# Patient Record
Sex: MALE | Race: WHITE | Employment: OTHER | URBAN - METROPOLITAN AREA
[De-identification: names, ages, dates, MRNs, and addresses within clinical notes are randomized per-mention and may not be internally consistent; named-entity substitution may affect disease eponyms.]

---

## 2017-03-30 ENCOUNTER — OFFICE VISIT (OUTPATIENT)
Dept: INTERNAL MEDICINE CLINIC | Age: 57
End: 2017-03-30

## 2017-04-17 ENCOUNTER — HOSPITAL ENCOUNTER (OUTPATIENT)
Dept: LAB | Age: 57
Discharge: HOME | End: 2017-04-17
Payer: COMMERCIAL

## 2017-04-17 DIAGNOSIS — I10 ESSENTIAL HYPERTENSION WITH GOAL BLOOD PRESSURE LESS THAN 140/90: ICD-10-CM

## 2017-04-17 DIAGNOSIS — R73.01 IFG (IMPAIRED FASTING GLUCOSE): ICD-10-CM

## 2017-04-17 LAB
ANION GAP BLD CALC-SCNC: 8 MMOL/L (ref 3–18)
BUN SERPL-MCNC: 14 MG/DL (ref 7–18)
BUN/CREAT SERPL: 18 (ref 12–20)
CALCIUM SERPL-MCNC: 8.3 MG/DL (ref 8.5–10.1)
CHLORIDE SERPL-SCNC: 104 MMOL/L (ref 100–108)
CO2 SERPL-SCNC: 28 MMOL/L (ref 21–32)
CREAT SERPL-MCNC: 0.8 MG/DL (ref 0.6–1.3)
CREAT UR-MCNC: 187 MG/DL (ref 30–125)
GLUCOSE SERPL-MCNC: 140 MG/DL (ref 74–99)
MICROALBUMIN UR-MCNC: 2.23 MG/DL (ref 0–3)
MICROALBUMIN/CREAT UR-RTO: 12 MG/G (ref 0–30)
POTASSIUM SERPL-SCNC: 4.2 MMOL/L (ref 3.5–5.5)
SODIUM SERPL-SCNC: 140 MMOL/L (ref 136–145)

## 2017-04-17 PROCEDURE — 80048 BASIC METABOLIC PNL TOTAL CA: CPT | Performed by: INTERNAL MEDICINE

## 2017-04-17 PROCEDURE — 36415 COLL VENOUS BLD VENIPUNCTURE: CPT | Performed by: INTERNAL MEDICINE

## 2017-04-17 PROCEDURE — 82043 UR ALBUMIN QUANTITATIVE: CPT | Performed by: INTERNAL MEDICINE

## 2017-04-18 NOTE — PROGRESS NOTES
61 y.o. WHITE OR  male who presents for evaluation. We called probable DM at the last visit. He declined metformin, but he did go for diabetic teaching. He notes that when he stays on plan, his weight and sugars are in good range. Remains active, weight is not going down as below    Vitals 4/20/2017 9/27/2016 3/30/2016 3/11/2016 3/7/2016 9/30/2015   Weight 256 lb 260 lb 248 lb 246 lb 8 oz 240 lb 259 lb     Vitals 9/28/2015   Weight 255 lb     No wheezing, dyspnea, chronic cough    No gi or gu complaints    He hasn't felt well the last day or so, just tired and he did not recognize he has a fever during this encounter. No generalized achiness, ear/sinus/throat sx, no cough, gi or gu sx. No rash, he admits to exposure at work to ill individuals    Past Medical History:   Diagnosis Date    Diverticulosis     and polyp 12/13 Dr Axel Vee    Dyslipidemia     calculatd 10 year risk score was 13.6% (9/15); 11.1% (3/16)    Fatty liver     on biopsy 2000 in [place];  Fib-4 was 1.25 from 3/15    Heel spur     left    Hypertension     IFG (impaired fasting glucose)     Morbid obesity (HCC)     peak weight 255 lbs, bmi 37.6 from 3/12; declined emilia suppressants, med supervised wt loss, bariatric options    Pancreatitis 10/14    and cholecystitis Dr Jacob Craig Tobacco dependence syndrome      Past Surgical History:   Procedure Laterality Date    HX CHOLECYSTECTOMY  12/14    Dr Irene Cerrato HX COLONOSCOPY      negative 2000; diverticulosis and polyps 2013 Dr Homer Leon  2015    left medial meniscus tear Dr Joanna Bender HX UROLOGICAL      testicular tortion    HX VASECTOMY      VASCULAR SURGERY PROCEDURE UNLIST  9/15    left GSV and EVLT Dr Leanna Langford Marital status:      Spouse name: N/A    Number of children: 1    Years of education: N/A     Occupational History    proj engr civilian working for [employer]      Social History Main Topics    Smoking status: Current Every Day Smoker     Types: Cigars    Smokeless tobacco: Never Used      Comment: smokes on cigar per day, previous hx. of cigarette smoking    Alcohol use Yes      Comment: social    Drug use: No    Sexual activity: Not on file     Other Topics Concern    Not on file     Social History Narrative     Current Outpatient Prescriptions   Medication Sig    metFORMIN ER (GLUCOPHAGE XR) 500 mg tablet Take 1 Tab by mouth daily (with dinner).  amLODIPine (NORVASC) 5 mg tablet TAKE ONE TABLET BY MOUTH EVERY DAY    Lancets (ACCU-CHEK MULTICLIX LANCET) misc TEST TWO TIMES A DAY. DX:E11.9    glucose blood VI test strips (ACCU-CHEK JONATHAN PLUS TEST STRP) strip TEST TWO TIMES A DAY. DX: 250.00    ondansetron (ZOFRAN ODT) 4 mg disintegrating tablet Take 1 Tab by mouth every eight (8) hours as needed for Nausea.  ascorbic acid (VITAMIN C) 500 mg tablet Take  by mouth.  diclofenac (VOLTAREN-XR) 100 mg ER tablet Take 100 mg by mouth daily.  vitamin E (AQUA GEMS) 400 unit capsule Take  by mouth daily.  MILK THISTLE, BULK, by Does Not Apply route.  coenzyme q10-vitamin e (COQ10 ) 100-100 mg-unit Cap Take  by mouth.  cholecalciferol (VITAMIN D3) 400 unit Tab tablet Take  by mouth daily. No current facility-administered medications for this visit.       Allergies   Allergen Reactions    Pcn [Penicillins] Swelling     REVIEW OF SYSTEMS: colo 12/13 Dr Ellie Forbes  Ophtho - no vision change or eye pain  Oral - no mouth pain, tongue or tooth problems  Ears - no hearing loss, ear pain, fullness, no swallowing problems  Cardiac - no CP, PND, orthopnea, edema, palpitations or syncope  Chest - no breast masses  Resp - no wheezing, chronic coughing, dyspnea  GI - no heartburn, nausea, vomiting, change in bowel habits, bleeding, hemorrhoids  Urinary - no dysuria, hematuria, flank pain, urgency, frequency  Ortho - no swelling, dec ROM, myalgias  Neuro - no focal weakness, numbness, paresthesias, incoordination, ataxia, involuntary movements  Endo - no polyuria, polydipsia, nocturia, hot flashes    Visit Vitals    /74    Pulse 95    Temp (!) 102.1 °F (38.9 °C) (Oral)    Ht 5' 9\" (1.753 m)    Wt 256 lb (116.1 kg)    SpO2 97%    BMI 37.8 kg/m2   A&O x3  Affect is appropriate. Mood stable  No apparent distress  Anicteric, no JVD, adenopathy or thyromegaly. No carotid bruits or radiated murmur  Lungs clear to auscultation, no wheezes or rales  Heart showed regular rate and rhythm. No murmur, rubs, gallops  Abdomen soft nontender, no hepatosplenomegaly or masses. Extremities without edema, venous varicosities noted.   Pulses 1-2+ symmetrically    LABS    From 1/07 showed   gluc 108, cr 0.90,             alt 110,  hba1c 5.3,                   chol 212, tg 129, hdl 51, ldl-c 135, wbc 8.1, hb 17.7, plt 214, ast 67  From 3/12 showed   gluc 128, cr 0.92, gfr 95,                hba1c 5.7, ldl-p 1412,                 wbc 7.0, hb 17.0, plt 211, tsh 2.13  From 4/12 showed   gluc 95,   cr 0.89, gfr 98,  alt 57,    hba1c 5.7,                   chol 163, tg 120, hdl 47, ldl-c 92  From 6/12 showed   gluc 90,   cr 0.80, gfr 85,  alt 19,    hba1c 5.2, ldl-p 1416  From 1/13 showed   gluc 96,   cr 0.88, gfr 98,  alt 24,    hba1c 5.1, ldl-p 1421, chol 185, tg 125, hdl 49, ldl-c 111   From 10/14 showed gluc 123, cr 1.00, gfr>60, alt 59,                    wbc 9.6, hb 16.7, plt 222, ast 437, alk phos 69, tb 4.7, lip 7684, ck/trop neg  From 3/15 showed   gluc 118, cr 0.81, gfr 100, alt 57,   hba1c 5.9,        chol 184, tg 200, hdl 44, ldl-c 100, wbc 8.0, hb 17.1, plt 216, tsh 2.23  From 9/15 showed   gluc 117, cr 0.90, gfr 95,       hba1c 6.0,        chol 200, tg 176, hdl 46, ldl-c 119  From 3/16 showed           hba1c 5.7,        chol 169, tg 154, hdl 45, ldl-c 93,           tsh 2.09  From 9/16 showed   gluc 132, cr 0.77, gfr>60,       hba1c 5.8,        chol 162, tg 160, hdl 43, ldl-c 87,   wbc 8.3, hb 16.3, plt 210  From 4/17 showed   gluc 140, cr 0.80, gfr>60,                   umar 12    Results for orders placed or performed in visit on 04/20/17   AMB POC RAPID INFLUENZA TEST   Result Value Ref Range    VALID INTERNAL CONTROL POC Yes     QuickVue Influenza test Negative Negative   AMB POC HEMOGLOBIN A1C   Result Value Ref Range    Hemoglobin A1c (POC) 6.5 %     Patient Active Problem List   Diagnosis Code    Dyslipidemia E78.5    Colon polyp and diverticulosis Dr Eric Bullock 12/13 K63.5    Chronic venous hypertension without complications S68.102    Varicose veins of lower extremities with other complications G51.532    Primary hypertension I10    Obesity (BMI 30-39. 9) E66.9    Tobacco dependence syndrome F17.200    Controlled type 2 diabetes mellitus E11.9     Assessment and plan:  1. Hypertension. Continue current   2. Obesity. Not interested in meds, bariatric options or medically supervised intensive wt loss programs at previous discussions  3. Transaminasemia. Wt loss   4. DM. Another long discussion and he finally decided to try metformin  5. Dyslipidemia. Lifestyle and dietary measures, he continues to decline statins despite prior discussions regarding current guidelines  6. Smoking cessation reiterated  7. Vascular. F/U Dr Zeynep Lomas  8. Probable viral syndrome. He will call if he has progressive sx          RTC 8/17    Above conditions discussed at length and patient vocalized understanding.   All questions answered to patient satifaction

## 2017-04-20 ENCOUNTER — OFFICE VISIT (OUTPATIENT)
Dept: INTERNAL MEDICINE CLINIC | Age: 57
End: 2017-04-20

## 2017-04-20 VITALS
BODY MASS INDEX: 37.92 KG/M2 | DIASTOLIC BLOOD PRESSURE: 74 MMHG | TEMPERATURE: 102.1 F | HEART RATE: 95 BPM | OXYGEN SATURATION: 97 % | WEIGHT: 256 LBS | SYSTOLIC BLOOD PRESSURE: 120 MMHG | HEIGHT: 69 IN

## 2017-04-20 DIAGNOSIS — E11.9 CONTROLLED TYPE 2 DIABETES MELLITUS WITHOUT COMPLICATION, WITHOUT LONG-TERM CURRENT USE OF INSULIN (HCC): Primary | ICD-10-CM

## 2017-04-20 DIAGNOSIS — K63.5 POLYP OF COLON, UNSPECIFIED PART OF COLON, UNSPECIFIED TYPE: ICD-10-CM

## 2017-04-20 DIAGNOSIS — E78.5 DYSLIPIDEMIA: ICD-10-CM

## 2017-04-20 DIAGNOSIS — B34.9 VIRAL SYNDROME: ICD-10-CM

## 2017-04-20 DIAGNOSIS — R50.9 FEVER, UNSPECIFIED FEVER CAUSE: ICD-10-CM

## 2017-04-20 DIAGNOSIS — I10 ESSENTIAL HYPERTENSION WITH GOAL BLOOD PRESSURE LESS THAN 140/90: ICD-10-CM

## 2017-04-20 DIAGNOSIS — E66.9 OBESITY (BMI 30-39.9): ICD-10-CM

## 2017-04-20 LAB
HBA1C MFR BLD HPLC: 6.5 %
QUICKVUE INFLUENZA TEST: NEGATIVE
VALID INTERNAL CONTROL?: YES

## 2017-04-20 RX ORDER — METFORMIN HYDROCHLORIDE 500 MG/1
500 TABLET, EXTENDED RELEASE ORAL
Qty: 30 TAB | Refills: 5 | Status: SHIPPED | OUTPATIENT
Start: 2017-04-20 | End: 2017-08-25

## 2017-04-20 NOTE — MR AVS SNAPSHOT
Visit Information Date & Time Provider Department Dept. Phone Encounter #  
 4/20/2017  3:00 PM Daphne Murphy MD Internist of [address] [id number] Your Appointments 8/25/2017  3:15 PM  
Office Visit with Daphne Murphy MD  
Internist of [address] [facility]) Appt Note: 4 mo follow up  
 [address] [facility] [employer] [facility], [address] [facility] [address] [place]  
  
   
 [address], [address] Upcoming Health Maintenance Date Due Hepatitis C Screening 1960 DTaP/Tdap/Td series (1 - Tdap) 7/21/1980 COLONOSCOPY 12/13/2023 Allergies as of 4/20/2017  Review Complete On: 4/17/2017 By: Daphne Murphy MD  
  
 Severity Noted Reaction Type Reactions Pcn [Penicillins]    Swelling Current Immunizations  Reviewed on 7/26/2012 Name Date Influenza Vaccine (Quad) PF 9/27/2016 Pneumococcal Polysaccharide (PPSV-23) 3/30/2016 Not reviewed this visit You Were Diagnosed With   
  
 Codes Comments Fever, unspecified fever cause    -  Primary ICD-10-CM: R50.9 ICD-9-CM: 780.60 Vitals BP Pulse Temp Height(growth percentile) Weight(growth percentile) SpO2  
 120/74 95 (!) 102.1 °F (38.9 °C) (Oral) 5' 9\" (1.753 m) 256 lb (116.1 kg) 97% BMI Smoking Status 37.8 kg/m2 Current Every Day Smoker Vitals History BMI and BSA Data Body Mass Index Body Surface Area  
 37.8 kg/m 2 2.38 m 2 Preferred Pharmacy Pharmacy Name Phone [facility] [address] [phone] Your Updated Medication List  
  
   
This list is accurate as of: 4/20/17  3:55 PM.  Always use your most recent med list. amLODIPine 5 mg tablet Commonly known as:  Piatt Cradle TAKE ONE TABLET BY MOUTH EVERY DAY  
  
 cholecalciferol 400 unit Tab tablet Commonly known as:  VITAMIN D3 Take  by mouth daily. COQ10  100-100 mg-unit Cap Generic drug:  coenzyme q10-vitamin e Take  by mouth. glucose blood VI test strips strip Commonly known as:  ACCU-CHEK JONATHAN PLUS TEST STRP  
TEST TWO TIMES A DAY. DX: 250.00 Lancets Misc Commonly known as:  ACCU-CHEK MULTICLIX LANCET  
TEST TWO TIMES A DAY. DX:E11.9 MILK THISTLE (BULK)  
by Does Not Apply route. ondansetron 4 mg disintegrating tablet Commonly known as:  ZOFRAN ODT Take 1 Tab by mouth every eight (8) hours as needed for Nausea. VITAMIN C 500 mg tablet Generic drug:  ascorbic acid (vitamin C) Take  by mouth.  
  
 vitamin E 400 unit capsule Commonly known as:  [address] Take  by mouth daily. VOLTAREN- mg ER tablet Generic drug:  diclofenac Take 100 mg by mouth daily. We Performed the Following AMB POC RAPID INFLUENZA TEST [61034 CPT(R)] Introducing [facility] SERVICES! Dear Ismael Vences: Thank you for requesting a Net[employer] account. Our records indicate that you already have an active Netcip[employer] account. You can access your account anytime at [email or web address] Did you know that you can access your hospital and ER discharge instructions at any time in Netcipia? You can also review all of your test results from your hospital stay or ER visit. Additional Information If you have questions, please visit the Frequently Asked Questions section of the Net[employer]ia website at [email or web address] Remember, Netcipia is NOT to be used for urgent needs. For medical emergencies, dial 911. Now available from your iPhone and Android! Please provide this summary of care documentation to your next provider. Your primary care clinician is listed as Jaky Page. If you have any questions after today's visit, please call [phone].

## 2017-04-20 NOTE — PROGRESS NOTES
1. Have you been to the ER, urgent care clinic or hospitalized since your last visit? NO.     2. Have you seen or consulted any other health care providers outside of the [address] [place] [address] since your last visit (Include any pap smears or colon screening)? NO      Do you have an Advanced Directive? NO    Would you like information on Advanced Directives?  YES

## 2017-04-21 RX ORDER — LANCETS
EACH MISCELLANEOUS
Qty: 100 EACH | Refills: 3 | Status: CANCELLED | OUTPATIENT
Start: 2017-04-21

## 2017-04-21 RX ORDER — LANCETS
EACH MISCELLANEOUS
Qty: 180 EACH | Refills: 11 | Status: SHIPPED | OUTPATIENT
Start: 2017-04-21 | End: 2018-12-08 | Stop reason: SDUPTHER

## 2017-04-21 NOTE — TELEPHONE ENCOUNTER
From: Tyshawn Melgar  To: Jeff Hawk MD  Sent: 4/21/2017 9:29 AM EDT  Subject: Medication Renewal Request    Original authorizing provider: MD Tyshawn Painter would like a refill of the following medications:  glucose blood VI test strips (ACCU-CHEK JONATHAN PLUS TEST STRP) strip Jeff Hawk MD]    Preferred pharmacy: [address] [place] [address]    Comment:

## 2017-05-23 RX ORDER — AMLODIPINE BESYLATE 5 MG/1
TABLET ORAL
Qty: 30 TAB | Refills: 6 | Status: SHIPPED | OUTPATIENT
Start: 2017-05-23 | End: 2018-01-01 | Stop reason: SDUPTHER

## 2017-08-21 ENCOUNTER — HOSPITAL ENCOUNTER (OUTPATIENT)
Dept: LAB | Age: 57
Discharge: HOME | End: 2017-08-21
Payer: COMMERCIAL

## 2017-08-21 DIAGNOSIS — E11.9 CONTROLLED TYPE 2 DIABETES MELLITUS WITHOUT COMPLICATION, WITHOUT LONG-TERM CURRENT USE OF INSULIN (HCC): ICD-10-CM

## 2017-08-21 LAB
ANION GAP SERPL CALC-SCNC: 7 MMOL/L (ref 3–18)
BUN SERPL-MCNC: 17 MG/DL (ref 7–18)
BUN/CREAT SERPL: 21 (ref 12–20)
CALCIUM SERPL-MCNC: 8.7 MG/DL (ref 8.5–10.1)
CHLORIDE SERPL-SCNC: 106 MMOL/L (ref 100–108)
CHOLEST SERPL-MCNC: 174 MG/DL
CO2 SERPL-SCNC: 28 MMOL/L (ref 21–32)
CREAT SERPL-MCNC: 0.82 MG/DL (ref 0.6–1.3)
GLUCOSE SERPL-MCNC: 145 MG/DL (ref 74–99)
HBA1C MFR BLD: 6.3 % (ref 4.2–5.6)
HDLC SERPL-MCNC: 44 MG/DL (ref 40–60)
HDLC SERPL: 4 {RATIO} (ref 0–5)
LDLC SERPL CALC-MCNC: 95.2 MG/DL (ref 0–100)
LIPID PROFILE,FLP: ABNORMAL
POTASSIUM SERPL-SCNC: 4.3 MMOL/L (ref 3.5–5.5)
SODIUM SERPL-SCNC: 141 MMOL/L (ref 136–145)
TRIGL SERPL-MCNC: 174 MG/DL (ref ?–150)
VLDLC SERPL CALC-MCNC: 34.8 MG/DL

## 2017-08-21 PROCEDURE — 36415 COLL VENOUS BLD VENIPUNCTURE: CPT | Performed by: INTERNAL MEDICINE

## 2017-08-21 PROCEDURE — 80048 BASIC METABOLIC PNL TOTAL CA: CPT | Performed by: INTERNAL MEDICINE

## 2017-08-21 PROCEDURE — 80061 LIPID PANEL: CPT | Performed by: INTERNAL MEDICINE

## 2017-08-21 PROCEDURE — 83036 HEMOGLOBIN GLYCOSYLATED A1C: CPT | Performed by: INTERNAL MEDICINE

## 2017-08-24 NOTE — PROGRESS NOTES
61 y.o. WHITE OR  male who presents for evaluation. He started metformin ER at the last visit but it gave him loose stools and he thinks a rash. Also reports a vague sensation in the neck area, no swelling, breathing difficulty, tongue sx. FBS in the 110-130 range. Denies polyuria, polydipsia, nocturia, vision change. No success w attempts at wt loss    Vitals 8/25/2017 4/20/2017 9/27/2016 3/30/2016 3/11/2016   Weight 256 lb 256 lb 260 lb 248 lb 246 lb 8 oz     No wheezing, dyspnea, chronic cough    No gi or gu complaints, specifically no gerd complaints    No sx referable to the thyroid    Past Medical History:   Diagnosis Date    Diabetes ([facility] [address] 2016    on basis of fbs>125    Diverticulosis     and polyp 12/13 Dr Cantrell Legacy    Dyslipidemia     calculatd 10 year risk score 13.6% (9/15); 11.1% (3/16); repeatedly declined statin    Fatty liver     on biopsy 2000 in ;  Fib-4 was 1.25 from 3/15    Heel spur     left    Hypertension     IFG (impaired fasting glucose)     Morbid obesity (HCC)     peak weight 255 lbs, bmi 37.6 from 3/12; declined emilia suppressants, med supervised wt loss, bariatric options    Pancreatitis 10/14    and cholecystitis Dr Lesley Kim Tobacco dependence syndrome      Past Surgical History:   Procedure Laterality Date    HX CHOLECYSTECTOMY  12/14    Dr Mia Cm HX COLONOSCOPY      negative 2000; diverticulosis and polyps 2013 Dr Haro Nones  2015    left medial meniscus tear Dr Nat Choudhury HX UROLOGICAL      testicular tortion    HX VASECTOMY      VASCULAR SURGERY PROCEDURE UNLIST  9/15    left GSV and EVLT Dr Mio Blanc Marital status:      Spouse name: N/A    Number of children: 1    Years of education: N/A     Occupational History    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 of cigarette smoking    Alcohol use Yes      Comment: social    Drug use: No    Sexual activity: Not on file     Other Topics Concern    Not on file     Social History Narrative     Current Outpatient Prescriptions   Medication Sig    atorvastatin (LIPITOR) 10 mg tablet Take 1 Tab by mouth daily.  amLODIPine (NORVASC) 5 mg tablet TAKE ONE TABLET BY MOUTH EVERY DAY    glucose blood VI test strips (ACCU-CHEK JONATHAN PLUS TEST STRP) strip TEST TWO TIMES A DAY. DX: E11.9    Lancets (ACCU-CHEK FASTCLIX) misc Pt checks blood sugar twice daily, Dx E11.9    Lancets (ACCU-CHEK MULTICLIX LANCET) misc TEST TWO TIMES A DAY. DX:E11.9    ascorbic acid (VITAMIN C) 500 mg tablet Take  by mouth.  coenzyme q10-vitamin e (COQ10 ) 100-100 mg-unit Cap Take  by mouth.  vitamin E (AQUA GEMS) 400 unit capsule Take  by mouth daily. No current facility-administered medications for this visit.       Allergies   Allergen Reactions    Metformin (Bulk) Diarrhea    Pcn [Penicillins] Swelling     REVIEW OF SYSTEMS: colo 12/13 Dr Go Phipps  Ophtho  no vision change or eye pain  Oral  no mouth pain, tongue or tooth problems  Ears  no hearing loss, ear pain, fullness, no swallowing problems  Cardiac  no CP, PND, orthopnea, edema, palpitations or syncope  Chest  no breast masses  Resp  no wheezing, chronic coughing, dyspnea  GI  no heartburn, nausea, vomiting, change in bowel habits, bleeding, hemorrhoids  Urinary  no dysuria, hematuria, flank pain, urgency, frequency  Ortho  no swelling, dec ROM, myalgias  Neuro  no focal weakness, numbness, paresthesias, incoordination, ataxia, involuntary movements  Endo - no polyuria, polydipsia, nocturia, hot flashes    Visit Vitals    /74 (BP 1 Location: Right arm, BP Patient Position: Sitting)    Pulse 77    Temp 98.6 °F (37 °C) (Oral)    Ht 5' 9\" (1.753 m)    Wt 256 lb (116.1 kg)    SpO2 98%    BMI 37.8 kg/m2   A&O x3  Affect is appropriate. Mood stable  No apparent distress  Anicteric, no JVD, adenopathy. Questionable fullness in the baseof neck  No carotid bruits or radiated murmur  Lungs clear to auscultation, no wheezes or rales  Heart showed regular rate and rhythm. No murmur, rubs, gallops  Abdomen soft nontender, no hepatosplenomegaly or masses. Extremities without edema, venous varicosities noted.   Pulses 1-2+ symmetrically    LABS    From 1/07 showed   gluc 108, cr 0.90,             alt 110,  hba1c 5.3,                   chol 212, tg 129, hdl 51, ldl-c 135, wbc 8.1, hb 17.7, plt 214, ast 67  From 3/12 showed   gluc 128, cr 0.92, gfr 95,                hba1c 5.7, ldl-p 1412,                 wbc 7.0, hb 17.0, plt 211, tsh 2.13  From 4/12 showed   gluc 95,   cr 0.89, gfr 98,  alt 57,    hba1c 5.7,                   chol 163, tg 120, hdl 47, ldl-c 92  From 6/12 showed   gluc 90,   cr 0.80, gfr 85,  alt 19,    hba1c 5.2, ldl-p 1416  From 1/13 showed   gluc 96,   cr 0.88, gfr 98,  alt 24,    hba1c 5.1, ldl-p 1421, chol 185, tg 125, hdl 49, ldl-c 111   From 10/14 showed gluc 123, cr 1.00, gfr>60, alt 59,                    wbc 9.6, hb 16.7, plt 222, ast 437, ap 69, tb 4.7, lip 7684, ck/trop neg  From 3/15 showed   gluc 118, cr 0.81, gfr 100, alt 57,   hba1c 5.9,        chol 184, tg 200, hdl 44, ldl-c 100, wbc 8.0, hb 17.1, plt 216, tsh 2.23  From 9/15 showed   gluc 117, cr 0.90, gfr 95,       hba1c 6.0,        chol 200, tg 176, hdl 46, ldl-c 119  From 3/16 showed           hba1c 5.7,        chol 169, tg 154, hdl 45, ldl-c 93,           tsh 2.09  From 9/16 showed   gluc 132, cr 0.77, gfr>60,       hba1c 5.8,        chol 162, tg 160, hdl 43, ldl-c 87,   wbc 8.3, hb 16.3, plt 210  From 4/17 showed   gluc 140, cr 0.80, gfr>60,       hba1c 6.5,                umar 12    Results for orders placed or performed during the hospital encounter of 95/14/61   METABOLIC PANEL, BASIC   Result Value Ref Range    Sodium 141 136 - 145 mmol/L    Potassium 4.3 3.5 - 5.5 mmol/L    Chloride 106 100 - 108 mmol/L    CO2 28 21 - 32 mmol/L    Anion gap 7 3.0 - 18 mmol/L    Glucose 145 (H) 74 - 99 mg/dL    BUN 17 7.0 - 18 MG/DL    Creatinine 0.82 0.6 - 1.3 MG/DL    BUN/Creatinine ratio 21 (H) 12 - 20      GFR est AA >60 >60 ml/min/1.73m2    GFR est non-AA >60 >60 ml/min/1.73m2    Calcium 8.7 8.5 - 10.1 MG/DL   LIPID PANEL   Result Value Ref Range    LIPID PROFILE          Cholesterol, total 174 <200 MG/DL    Triglyceride 174 (H) <150 MG/DL    HDL Cholesterol 44 40 - 60 MG/DL    LDL, calculated 95.2 0 - 100 MG/DL    VLDL, calculated 34.8 MG/DL    CHOL/HDL Ratio 4.0 0 - 5.0     HEMOGLOBIN A1C W/O EAG   Result Value Ref Range    Hemoglobin A1c 6.3 (H) 4.2 - 5.6 %     Patient Active Problem List   Diagnosis Code    Dyslipidemia E78.5    Colon polyp Dr Rasheed Szymanski 12/13 K63.5    Chronic venous hypertension without complications Q83.726    Varicose veins of lower extremities with other complications W77.514    Primary hypertension I10    Obesity (BMI 30-39. 9) E66.9    Tobacco dependence syndrome F17.200    Controlled type 2 diabetes mellitus E11.9    Diverticulosis  K57.30     Assessment and plan:  1. Hypertension. Continue current   2. Obesity. Not interested in meds, bariatric options or medically supervised intensive wt loss program  3. Fatty liver. Wt loss  4. DM. Declined changing to anything else even after discussion of various options, will recheck in 4 mos and go from there  5. Dyslipidemia. Lifestyle and dietary measures, he is open to trying statin now, sfx discussed at length. Check lfts 1 and 2 mos  6. Smoking cessation reiterated  7. Vascular. F/U Dr Yanni Dumont  8. Ultrasound thyroid ordered        RTC 12/17    Above conditions discussed at length and patient vocalized understanding.   All questions answered to patient satifaction

## 2017-08-25 ENCOUNTER — OFFICE VISIT (OUTPATIENT)
Dept: INTERNAL MEDICINE CLINIC | Age: 57
End: 2017-08-25

## 2017-08-25 VITALS
HEIGHT: 69 IN | BODY MASS INDEX: 37.92 KG/M2 | TEMPERATURE: 98.6 F | HEART RATE: 77 BPM | SYSTOLIC BLOOD PRESSURE: 128 MMHG | DIASTOLIC BLOOD PRESSURE: 74 MMHG | WEIGHT: 256 LBS | OXYGEN SATURATION: 98 %

## 2017-08-25 DIAGNOSIS — E11.9 CONTROLLED TYPE 2 DIABETES MELLITUS WITHOUT COMPLICATION, WITHOUT LONG-TERM CURRENT USE OF INSULIN (HCC): ICD-10-CM

## 2017-08-25 DIAGNOSIS — E04.9 GOITER: ICD-10-CM

## 2017-08-25 DIAGNOSIS — I10 ESSENTIAL HYPERTENSION WITH GOAL BLOOD PRESSURE LESS THAN 140/90: ICD-10-CM

## 2017-08-25 DIAGNOSIS — I87.303 CHRONIC VENOUS HYPERTENSION WITHOUT COMPLICATIONS, BILATERAL: ICD-10-CM

## 2017-08-25 DIAGNOSIS — E78.5 DYSLIPIDEMIA: ICD-10-CM

## 2017-08-25 DIAGNOSIS — K63.5 HYPERPLASTIC COLONIC POLYP, UNSPECIFIED PART OF COLON: ICD-10-CM

## 2017-08-25 DIAGNOSIS — Z23 ENCOUNTER FOR IMMUNIZATION: Primary | ICD-10-CM

## 2017-08-25 DIAGNOSIS — E66.9 OBESITY (BMI 30-39.9): ICD-10-CM

## 2017-08-25 PROBLEM — K57.30 DIVERTICULOSIS OF LARGE INTESTINE WITHOUT HEMORRHAGE: Status: ACTIVE | Noted: 2017-08-25

## 2017-08-25 RX ORDER — ATORVASTATIN CALCIUM 10 MG/1
10 TABLET, FILM COATED ORAL DAILY
Qty: 30 TAB | Refills: 5 | Status: SHIPPED | OUTPATIENT
Start: 2017-08-25 | End: 2018-02-10 | Stop reason: SDUPTHER

## 2017-08-25 NOTE — PATIENT INSTRUCTIONS
Advance Directives: Care Instructions  Your Care Instructions  An advance directive is a legal way to state your wishes at the end of your life. It tells your family and your doctor what to do if you can no longer say what you want. There are two main types of advance directives. You can change them any time that your wishes change. · A living will tells your family and your doctor your wishes about life support and other treatment. · A durable power of  for health care lets you name a person to make treatment decisions for you when you can't speak for yourself. This person is called a health care agent. If you do not have an advance directive, decisions about your medical care may be made by a doctor or a  who doesn't know you. It may help to think of an advance directive as a gift to the people who care for you. If you have one, they won't have to make tough decisions by themselves. Follow-up care is a key part of your treatment and safety. Be sure to make and go to all appointments, and call your doctor if you are having problems. It's also a good idea to know your test results and keep a list of the medicines you take. How can you care for yourself at home? · Discuss your wishes with your loved ones and your doctor. This way, there are no surprises. · Many states have a unique form. Or you might use a universal form that has been approved by many states. This kind of form can sometimes be completed and stored online. Your electronic copy will then be available wherever you have a connection to the Internet. In most cases, doctors will respect your wishes even if you have a form from a different state. · You don't need a  to do an advance directive. But you may want to get legal advice. · Think about these questions when you prepare an advance directive:  ¨ Who do you want to make decisions about your medical care if you are not able to?  Many people choose a family member or close friend. ¨ Do you know enough about life support methods that might be used? If not, talk to your doctor so you understand. ¨ What are you most afraid of that might happen? You might be afraid of having pain, losing your independence, or being kept alive by machines. ¨ Where would you prefer to die? Choices include your home, a hospital, or a nursing home. ¨ Would you like to have information about hospice care to support you and your family? ¨ Do you want to donate organs when you die? ¨ Do you want certain Rastafarian practices performed before you die? If so, put your wishes in the advance directive. · Read your advance directive every year, and make changes as needed. When should you call for help? Be sure to contact your doctor if you have any questions. Where can you learn more? Go to http://judi-bianca.info/. Enter R264 in the search box to learn more about \"Advance Directives: Care Instructions. \"  Current as of: November 17, 2016  Content Version: 11.3  © 4341-8093 [employer]. Care instructions adapted under license by [employer] (which disclaims liability or warranty for this information). If you have questions about a medical condition or this instruction, always ask your healthcare professional. [address] any warranty or liability for your use of this information.

## 2017-08-25 NOTE — MR AVS SNAPSHOT
Visit Information Date & Time Provider Department Dept. Phone Encounter #  
 8/25/2017  3:15 PM Marixa Lamb MD Internist of [address] [id number] Your Appointments 1/8/2018  9:00 AM  
Office Visit with Marixa Lamb MD  
Internist of [address] [facility]) Appt Note: 4 month f/u  
 [address] [facility], [address] [place] [place]  
  
   
 [address], [address] Upcoming Health Maintenance Date Due Hepatitis C Screening 1960 FOOT EXAM Q1 7/21/1969 DTaP/Tdap/Td series (1 - Tdap) 7/21/1980 HEMOGLOBIN A1C Q6M 2/21/2018 MICROALBUMIN Q1 4/17/2018 EYE EXAM RETINAL OR DILATED Q1 6/15/2018 LIPID PANEL Q1 8/21/2018 COLONOSCOPY 12/13/2023 Allergies as of 8/25/2017  Review Complete On: 4/20/2017 By: Marixa Lamb MD  
  
 Severity Noted Reaction Type Reactions Pcn [Penicillins]    Swelling Current Immunizations  Reviewed on 8/25/2017 Name Date Influenza Vaccine (Quad) PF 8/25/2017  3:57 PM, 9/27/2016 Pneumococcal Polysaccharide (PPSV-23) 3/30/2016 Reviewed by Marixa Lamb MD on 8/23/2017 at 10:21 PM  
 Reviewed by Anthony Hou on 8/25/2017 at  3:03 PM  
 Reviewed by Atnhony Hou on 8/25/2017 at  4:00 PM  
You Were Diagnosed With   
  
 Codes Comments Encounter for immunization    -  Primary ICD-10-CM: M37 ICD-9-CM: V03.89 Vitals BP Pulse Temp Height(growth percentile) Weight(growth percentile) SpO2  
 146/86 (BP 1 Location: Right arm, BP Patient Position: Sitting) 77 98.6 °F (37 °C) (Oral) 5' 9\" (1.753 m) 256 lb (116.1 kg) 98% BMI Smoking Status 37.8 kg/m2 Current Every Day Smoker Vitals History BMI and BSA Data Body Mass Index Body Surface Area  
 37.8 kg/m 2 2.38 m 2 Preferred Pharmacy Pharmacy Name Phone [address] [phone] Your Updated Medication List  
  
   
This list is accurate as of: 8/25/17  4:06 PM.  Always use your most recent med list. amLODIPine 5 mg tablet Commonly known as:  Citlali Ape TAKE ONE TABLET BY MOUTH EVERY DAY  
  
 cholecalciferol 400 unit Tab tablet Commonly known as:  VITAMIN D3 Take  by mouth daily. COQ10  100-100 mg-unit Cap Generic drug:  coenzyme q10-vitamin e Take  by mouth. glucose blood VI test strips strip Commonly known as:  ACCU-CHEK JONATHAN PLUS TEST STRP  
TEST TWO TIMES A DAY. DX: E11.9 * Lancets Misc Commonly known as:  ACCU-CHEK MULTICLIX LANCET  
TEST TWO TIMES A DAY. DX:E11.9 * Lancets Misc Commonly known as:  ACCU-CHEK FASTCLIX Pt checks blood sugar twice daily, Dx E11.9  
  
 metFORMIN  mg tablet Commonly known as:  GLUCOPHAGE XR Take 1 Tab by mouth daily (with dinner). MILK THISTLE (BULK)  
by Does Not Apply route. VITAMIN C 500 mg tablet Generic drug:  ascorbic acid (vitamin C) Take  by mouth.  
  
 vitamin E 400 unit capsule Commonly known as:  [address] Take  by mouth daily. VOLTAREN- mg ER tablet Generic drug:  diclofenac Take 100 mg by mouth daily. * Notice: This list has 2 medication(s) that are the same as other medications prescribed for you. Read the directions carefully, and ask your doctor or other care provider to review them with you. We Performed the Following INFLUENZA VIRUS VAC QUAD,SPLIT,PRESV FREE SYRINGE 3/> YRS IM [id number] CPT(R)] Patient Instructions Advance Directives: Care Instructions Your Care Instructions An advance directive is a legal way to state your wishes at the end of your life. It tells your family and your doctor what to do if you can no longer say what you want. There are two main types of advance directives.  You can change them any time that your wishes change. · A living will tells your family and your doctor your wishes about life support and other treatment. · A durable power of  for health care lets you name a person to make treatment decisions for you when you can't speak for yourself. This person is called a health care agent. If you do not have an advance directive, decisions about your medical care may be made by a doctor or a  who doesn't know you. It may help to think of an advance directive as a gift to the people who care for you. If you have one, they won't have to make tough decisions by themselves. Follow-up care is a key part of your treatment and safety. Be sure to make and go to all appointments, and call your doctor if you are having problems. It's also a good idea to know your test results and keep a list of the medicines you take. How can you care for yourself at home? · Discuss your wishes with your loved ones and your doctor. This way, there are no surprises. · Many states have a unique form. Or you might use a universal form that has been approved by many states. This kind of form can sometimes be completed and stored online. Your electronic copy will then be available wherever you have a connection to the Internet. In most cases, doctors will respect your wishes even if you have a form from a different state. · You don't need a  to do an advance directive. But you may want to get legal advice. · Think about these questions when you prepare an advance directive: ¨ Who do you want to make decisions about your medical care if you are not able to? Many people choose a family member or close friend. ¨ Do you know enough about life support methods that might be used? If not, talk to your doctor so you understand. ¨ What are you most afraid of that might happen? You might be afraid of having pain, losing your independence, or being kept alive by machines. ¨ Where would you prefer to die? Choices include your home, a hospital, or a nursing home. ¨ Would you like to have information about hospice care to support you and your family? ¨ Do you want to donate organs when you die? ¨ Do you want certain Jew practices performed before you die? If so, put your wishes in the advance directive. · Read your advance directive every year, and make changes as needed. When should you call for help? Be sure to contact your doctor if you have any questions. Where can you learn more? Go to http://judi-bianca.info/. Enter R264 in the search box to learn more about \"Advance Directives: Care Instructions. \" Current as of: November 17, 2016 Content Version: 11.3 © 0055-2395 [employer]. Care instructions adapted under license by [employer] (which disclaims liability or warranty for this information). If you have questions about a medical condition or this instruction, always ask your healthcare professional. [place] [address] any warranty or liability for your use of this information. Introducing [facility]! Dear Paul Lu: Thank you for requesting a [employer]ah[employer] account. Our records indicate that you already have an active Ekahau account. You can access your account anytime at [email or web address] "[employer]"/Moonfrye Did you know that you can access your hospital and ER discharge instructions at any time in Ekahau? You can also review all of your test results from your hospital stay or ER visit. Additional Information If you have questions, please visit the Frequently Asked Questions section of the Ek[employer]au website at [email or web address] "[email or web address]"/[email or web address]/. Remember, Ekahau is NOT to be used for urgent needs. For medical emergencies, dial 911. Now available from your iPhone and Android! Please provide this summary of care documentation to your next provider. Your primary care clinician is listed as Damien Jeff. If you have any questions after today's visit, please call [phone].

## 2017-08-25 NOTE — PROGRESS NOTES
1. Have you been to the ER, urgent care clinic or hospitalized since your last visit? NO.     2. Have you seen or consulted any other health care providers outside of the [address] [place] [address] since your last visit (Include any pap smears or colon screening)? Yes Patient states he got pink eye within the last 4 months and went to his eye doctor. Do you have an Advanced Directive? NO    Would you like information on Advanced Directives? YES    Health Maintenance Due   Topic Date Due    Hepatitis C Screening  1960    FOOT EXAM Q1  07/21/1969    DTaP/Tdap/Td series (1 - Tdap) 07/21/1980    INFLUENZA AGE 9 TO ADULT  08/01/2017     Patient states that he stopped taking his Metformin a week ago due to him breaking out in rashes, not feeling well with it, and elevated blood sugars. Patient given influenza vaccine Fluarix into left deltoid area and tolerated well without adverse reactions or complications. Patient given vaccine information statement handout.

## 2017-09-06 ENCOUNTER — HOSPITAL ENCOUNTER (OUTPATIENT)
Dept: ULTRASOUND IMAGING | Age: 57
Discharge: HOME | End: 2017-09-06
Attending: INTERNAL MEDICINE
Payer: COMMERCIAL

## 2017-09-06 DIAGNOSIS — E04.9 GOITER: ICD-10-CM

## 2017-09-06 PROCEDURE — 76536 US EXAM OF HEAD AND NECK: CPT

## 2017-09-08 ENCOUNTER — TELEPHONE (OUTPATIENT)
Dept: INTERNAL MEDICINE CLINIC | Age: 57
End: 2017-09-08

## 2017-09-26 ENCOUNTER — HOSPITAL ENCOUNTER (OUTPATIENT)
Dept: LAB | Age: 57
Discharge: HOME | End: 2017-09-26
Payer: COMMERCIAL

## 2017-09-26 DIAGNOSIS — E78.5 DYSLIPIDEMIA: ICD-10-CM

## 2017-09-26 LAB
ALBUMIN SERPL-MCNC: 3.8 G/DL (ref 3.4–5)
ALBUMIN/GLOB SERPL: 1.2 {RATIO} (ref 0.8–1.7)
ALP SERPL-CCNC: 57 U/L (ref 45–117)
ALT SERPL-CCNC: 67 U/L (ref 16–61)
AST SERPL-CCNC: 38 U/L (ref 15–37)
BILIRUB DIRECT SERPL-MCNC: 0.1 MG/DL (ref 0–0.2)
BILIRUB SERPL-MCNC: 0.6 MG/DL (ref 0.2–1)
GLOBULIN SER CALC-MCNC: 3.3 G/DL (ref 2–4)
PROT SERPL-MCNC: 7.1 G/DL (ref 6.4–8.2)

## 2017-09-26 PROCEDURE — 36415 COLL VENOUS BLD VENIPUNCTURE: CPT | Performed by: INTERNAL MEDICINE

## 2017-09-26 PROCEDURE — 80076 HEPATIC FUNCTION PANEL: CPT | Performed by: INTERNAL MEDICINE

## 2017-09-28 ENCOUNTER — TELEPHONE (OUTPATIENT)
Dept: INTERNAL MEDICINE CLINIC | Age: 57
End: 2017-09-28

## 2017-09-29 NOTE — TELEPHONE ENCOUNTER
Called and spoke to patient about below message. Patient verbalized understanding with no additional questions.

## 2017-09-29 NOTE — TELEPHONE ENCOUNTER
pls call    Results for orders placed or performed during the hospital encounter of 09/26/17   HEPATIC FUNCTION PANEL   Result Value Ref Range    Protein, total 7.1 6.4 - 8.2 g/dL    Albumin 3.8 3.4 - 5.0 g/dL    Globulin 3.3 2.0 - 4.0 g/dL    A-G Ratio 1.2 0.8 - 1.7      Bilirubin, total 0.6 0.2 - 1.0 MG/DL    Bilirubin, direct 0.1 0.0 - 0.2 MG/DL    Alk.  phosphatase 57 45 - 117 U/L    AST (SGOT) 38 (H) 15 - 37 U/L    ALT (SGPT) 67 (H) 16 - 61 U/L     Slight elevation lfts compared to prior  If willing to continue statin, recheck in 1 month

## 2017-11-02 ENCOUNTER — HOSPITAL ENCOUNTER (OUTPATIENT)
Dept: LAB | Age: 57
Discharge: HOME | End: 2017-11-02
Payer: COMMERCIAL

## 2017-11-02 DIAGNOSIS — E78.5 DYSLIPIDEMIA: ICD-10-CM

## 2017-11-02 LAB
ALBUMIN SERPL-MCNC: 3.9 G/DL (ref 3.4–5)
ALBUMIN/GLOB SERPL: 1.2 {RATIO} (ref 0.8–1.7)
ALP SERPL-CCNC: 57 U/L (ref 45–117)
ALT SERPL-CCNC: 82 U/L (ref 16–61)
AST SERPL-CCNC: 44 U/L (ref 15–37)
BILIRUB DIRECT SERPL-MCNC: 0.2 MG/DL (ref 0–0.2)
BILIRUB SERPL-MCNC: 0.7 MG/DL (ref 0.2–1)
GLOBULIN SER CALC-MCNC: 3.3 G/DL (ref 2–4)
PROT SERPL-MCNC: 7.2 G/DL (ref 6.4–8.2)

## 2017-11-02 PROCEDURE — 80076 HEPATIC FUNCTION PANEL: CPT | Performed by: INTERNAL MEDICINE

## 2017-11-02 PROCEDURE — 36415 COLL VENOUS BLD VENIPUNCTURE: CPT | Performed by: INTERNAL MEDICINE

## 2017-11-10 ENCOUNTER — TELEPHONE (OUTPATIENT)
Dept: INTERNAL MEDICINE CLINIC | Age: 57
End: 2017-11-10

## 2017-11-10 NOTE — TELEPHONE ENCOUNTER
pls call    Results for orders placed or performed during the hospital encounter of 11/02/17   HEPATIC FUNCTION PANEL   Result Value Ref Range    Protein, total 7.2 6.4 - 8.2 g/dL    Albumin 3.9 3.4 - 5.0 g/dL    Globulin 3.3 2.0 - 4.0 g/dL    A-G Ratio 1.2 0.8 - 1.7      Bilirubin, total 0.7 0.2 - 1.0 MG/DL    Bilirubin, direct 0.2 0.0 - 0.2 MG/DL    Alk.  phosphatase 57 45 - 117 U/L    AST (SGOT) 44 (H) 15 - 37 U/L    ALT (SGPT) 82 (H) 16 - 61 U/L     lfts not much different  Cont statin and recheck as scheduled

## 2018-01-02 ENCOUNTER — TELEPHONE (OUTPATIENT)
Dept: INTERNAL MEDICINE CLINIC | Age: 58
End: 2018-01-02

## 2018-01-02 DIAGNOSIS — E11.9 CONTROLLED TYPE 2 DIABETES MELLITUS WITHOUT COMPLICATION, WITHOUT LONG-TERM CURRENT USE OF INSULIN (HCC): Primary | ICD-10-CM

## 2018-01-02 RX ORDER — AMLODIPINE BESYLATE 5 MG/1
TABLET ORAL
Qty: 30 TAB | Refills: 5 | Status: SHIPPED | OUTPATIENT
Start: 2018-01-02 | End: 2018-04-09 | Stop reason: SDUPTHER

## 2018-01-04 NOTE — PROGRESS NOTES
61 y.o. WHITE OR  male who presents for evaluation. He is off metformin. FBS in the 110-130 range. Denies polyuria, polydipsia, nocturia, vision change. No success w attempts at wt loss    Vitals 1/8/2018 8/25/2017 4/20/2017 9/27/2016 3/30/2016   Weight 255 lb 12.8 oz 256 lb 256 lb 260 lb 248 lb     He had complained of a vague sensation in the neck last time. Today, he complains of a 'discomfort' in the superior portion of the right chest just at the insertioj point in the clavicle extending medially into the sternum. It's also vague, clearly nonexertional as he shoveled a lot of snow the last 2 days without makjing this worse or even flare up. He took aleve once or twice, didn't help. No associated resp sx, not related to eating, denied any neck pain or radicular sx down the arms. No wheezing, dyspnea, chronic cough    No gi or gu complaints    No sx referable to the thyroid, US negative as below    Past Medical History:   Diagnosis Date    Diabetes ([facility] [address] 2016    on basis of fbs>125    Diverticulosis     and polyp 12/13 Dr Valeria Schwab    Dyslipidemia     calculatd 10 year risk score 13.6% (9/15); 11.1% (3/16); repeatedly declined statin    Fatty liver     on biopsy 2000 in ;  Fib-4 was 1.25 from 3/15    Heel spur     left    Hypertension     IFG (impaired fasting glucose)     Morbid obesity (HCC)     peak weight 255 lbs, bmi 37.6 from 3/12; declined emilia suppressants, med supervised wt loss, bariatric options    Pancreatitis 10/14    and cholecystitis Dr Dian Damon Tobacco dependence syndrome      Past Surgical History:   Procedure Laterality Date    HX CHOLECYSTECTOMY  12/14    Dr Nathaniel Newell HX COLONOSCOPY      negative 2000; diverticulosis and polyps 2013 Dr Valeria Schwab    HX HEENT  09/2017    US thyroid negative    HX ORTHOPAEDIC  2015    left medial meniscus tear Dr Marisela Cotto HX UROLOGICAL      testicular tortion    HX VASECTOMY      VASCULAR SURGERY PROCEDURE UNLIST 9/15    left GSV and EVLT Dr Juris Bernheim History     Social History    Marital status:      Spouse name: N/A    Number of children: 1    Years of education: N/A     Occupational History    proj engr civilian working for [employer]      Social History Main Topics    Smoking status: Current Every Day Smoker     Types: Cigars    Smokeless tobacco: Never Used      Comment: smokes on cigar per day, previous hx. of cigarette smoking    Alcohol use Yes      Comment: social    Drug use: No    Sexual activity: Not on file     Other Topics Concern    Not on file     Social History Narrative     Current Outpatient Prescriptions   Medication Sig    amLODIPine (NORVASC) 5 mg tablet take 1 tablet by mouth daily    atorvastatin (LIPITOR) 10 mg tablet Take 1 Tab by mouth daily.  glucose blood VI test strips (ACCU-CHEK JONATHAN PLUS TEST STRP) strip TEST TWO TIMES A DAY. DX: E11.9    Lancets (ACCU-CHEK FASTCLIX) misc Pt checks blood sugar twice daily, Dx E11.9    Lancets (ACCU-CHEK MULTICLIX LANCET) misc TEST TWO TIMES A DAY. DX:E11.9    ascorbic acid (VITAMIN C) 500 mg tablet Take  by mouth.  vitamin E (AQUA GEMS) 400 unit capsule Take  by mouth daily.  coenzyme q10-vitamin e (COQ10 ) 100-100 mg-unit Cap Take  by mouth. No current facility-administered medications for this visit.       Allergies   Allergen Reactions    Metformin (Bulk) Diarrhea    Pcn [Penicillins] Swelling     REVIEW OF SYSTEMS: colo 12/13 Dr Danielle Garcia  Ophtho  no vision change or eye pain  Oral  no mouth pain, tongue or tooth problems  Ears  no hearing loss, ear pain, fullness, no swallowing problems  Cardiac  no CP, PND, orthopnea, edema, palpitations or syncope  Chest  no breast masses  Resp  no wheezing, chronic coughing, dyspnea  GI  no heartburn, nausea, vomiting, change in bowel habits, bleeding, hemorrhoids  Urinary  no dysuria, hematuria, flank pain, urgency, frequency  Ortho  no swelling, dec ROM, myalgias  Neuro  no focal weakness, numbness, paresthesias, incoordination, ataxia, involuntary movements  Endo - no polyuria, polydipsia, nocturia, hot flashes    Visit Vitals    /84 (BP 1 Location: Right arm, BP Patient Position: Sitting)    Pulse 71    Temp 98.9 °F (37.2 °C) (Oral)    Resp 14    Ht 5' 9\" (1.753 m)    Wt 255 lb 12.8 oz (116 kg)    SpO2 98%    BMI 37.78 kg/m2   A&O x3  Affect is appropriate. Mood stable  No apparent distress  Anicteric, no JVD, adenopathy. Questionable fullness in the baseof neck  No carotid bruits or radiated murmur  Lungs clear to auscultation, no wheezes or rales  Chest wall minimal tenderness in the right upper/medial chest area, no bruising, swelling, rash  Heart showed regular rate and rhythm. No murmur, rubs, gallops  Abdomen soft nontender, no hepatosplenomegaly or masses. Extremities without edema, venous varicosities noted.   Pulses 1-2+ symmetrically    LABS    From 1/07 showed   gluc 108, cr 0.90,             alt 110,  hba1c 5.3,                   chol 212, tg 129, hdl 51, ldl-c 135, wbc 8.1, hb 17.7, plt 214, ast 67  From 3/12 showed   gluc 128, cr 0.92, gfr 95,                hba1c 5.7, ldl-p 1412,                 wbc 7.0, hb 17.0, plt 211, tsh 2.13  From 4/12 showed   gluc 95,   cr 0.89, gfr 98,  alt 57,    hba1c 5.7,                   chol 163, tg 120, hdl 47, ldl-c 92  From 6/12 showed   gluc 90,   cr 0.80, gfr 85,  alt 19,    hba1c 5.2, ldl-p 1416  From 1/13 showed   gluc 96,   cr 0.88, gfr 98,  alt 24,    hba1c 5.1, ldl-p 1421, chol 185, tg 125, hdl 49, ldl-c 111   From 10/14 showed gluc 123, cr 1.00, gfr>60, alt 59,                    wbc 9.6, hb 16.7, plt 222, ast 47, ap 69, tb 4.7, lip 7684, ck/trop neg  From 3/15 showed   gluc 118, cr 0.81, gfr>60, alt 57,    hba1c 5.9,        chol 184, tg 200, hdl 44, ldl-c 100, wbc 8.0, hb 17.1, plt 216, tsh 2.23  From 9/15 showed   gluc 117, cr 0.90, gfr 95,       hba1c 6.0,        chol 200, tg 176, hdl 46, ldl-c 119  From 3/16 showed           hba1c 5.7,        chol 169, tg 154, hdl 45, ldl-c 93,          tsh 2.09  From 9/16 showed   gluc 132, cr 0.77, gfr>60,       hba1c 5.8,        chol 162, tg 160, hdl 43, ldl-c 87,  wbc 8.3, hb 16.3, plt 210  From 4/17 showed   gluc 140, cr 0.80, gfr>60,       hba1c 6.5,               umar 12  From 8/17 showed   gluc 145, cr 0.82, gfr>60,       hba1c 6.3,        chol 174, tg 174, hdl 44, ldl-c 95  From 11/17 showed        alt 82,                 ast 44, ap 57, tb 0.7    EKG as read by me showed nsr rate 73, LETY, non spec t wave changes, no change from 11/14 tracing    Patient Active Problem List   Diagnosis Code    Dyslipidemia E78.5    Colon polyp Dr Benjamin Amador 12/13 K63.5    Chronic venous hypertension without complications L44.038    Varicose veins of lower extremities with other complications S21.743    Primary hypertension I10    Obesity (BMI 30-39. 9) E66.9    Tobacco dependence syndrome F17.200    Controlled type 2 diabetes mellitus E11.9    Diverticulosis  K57.30     Assessment and plan:  1. Hypertension. Continue current   2. Obesity. Not interested in meds, bariatric options or medically supervised intensive wt loss program  3. Fatty liver. Wt loss  4. DM. On dietary and lifestyle measures, a1c and umar done today, will call w results  5. Dyslipidemia. Lifestyle and dietary measures, check lipids and lfts today and decide on course after that  6. Smoking cessation reiterated  7. Vascular. F/U Dr Thomas Nazario  8. Probable chest wall pain? Trial aleve 1-2 weeks, call w update        RTC 4/18    Above conditions discussed at length and patient vocalized understanding.   All questions answered to patient satifaction

## 2018-01-08 ENCOUNTER — TELEPHONE (OUTPATIENT)
Dept: INTERNAL MEDICINE CLINIC | Age: 58
End: 2018-01-08

## 2018-01-08 ENCOUNTER — HOSPITAL ENCOUNTER (OUTPATIENT)
Dept: LAB | Age: 58
Discharge: HOME | End: 2018-01-08
Payer: COMMERCIAL

## 2018-01-08 ENCOUNTER — OFFICE VISIT (OUTPATIENT)
Dept: INTERNAL MEDICINE CLINIC | Age: 58
End: 2018-01-08

## 2018-01-08 VITALS
SYSTOLIC BLOOD PRESSURE: 130 MMHG | HEIGHT: 69 IN | TEMPERATURE: 98.9 F | RESPIRATION RATE: 14 BRPM | OXYGEN SATURATION: 98 % | BODY MASS INDEX: 37.89 KG/M2 | HEART RATE: 71 BPM | DIASTOLIC BLOOD PRESSURE: 84 MMHG | WEIGHT: 255.8 LBS

## 2018-01-08 DIAGNOSIS — E11.9 CONTROLLED TYPE 2 DIABETES MELLITUS WITHOUT COMPLICATION, WITHOUT LONG-TERM CURRENT USE OF INSULIN (HCC): ICD-10-CM

## 2018-01-08 DIAGNOSIS — R07.9 CHEST PAIN, UNSPECIFIED TYPE: Primary | ICD-10-CM

## 2018-01-08 DIAGNOSIS — E78.5 DYSLIPIDEMIA: ICD-10-CM

## 2018-01-08 DIAGNOSIS — I10 ESSENTIAL HYPERTENSION WITH GOAL BLOOD PRESSURE LESS THAN 140/90: ICD-10-CM

## 2018-01-08 DIAGNOSIS — R07.9 CHEST PAIN, UNSPECIFIED TYPE: ICD-10-CM

## 2018-01-08 LAB
ALBUMIN SERPL-MCNC: 4.1 G/DL (ref 3.4–5)
ALBUMIN/GLOB SERPL: 1.2 {RATIO} (ref 0.8–1.7)
ALP SERPL-CCNC: 61 U/L (ref 45–117)
ALT SERPL-CCNC: 63 U/L (ref 16–61)
ANION GAP SERPL CALC-SCNC: 10 MMOL/L (ref 3–18)
AST SERPL-CCNC: 33 U/L (ref 15–37)
BILIRUB SERPL-MCNC: 0.5 MG/DL (ref 0.2–1)
BUN SERPL-MCNC: 15 MG/DL (ref 7–18)
BUN/CREAT SERPL: 19 (ref 12–20)
CALCIUM SERPL-MCNC: 8.6 MG/DL (ref 8.5–10.1)
CHLORIDE SERPL-SCNC: 103 MMOL/L (ref 100–108)
CHOLEST SERPL-MCNC: 138 MG/DL
CO2 SERPL-SCNC: 23 MMOL/L (ref 21–32)
CREAT SERPL-MCNC: 0.78 MG/DL (ref 0.6–1.3)
GLOBULIN SER CALC-MCNC: 3.3 G/DL (ref 2–4)
GLUCOSE SERPL-MCNC: 185 MG/DL (ref 74–99)
HBA1C MFR BLD: 6.7 % (ref 4.2–5.6)
HDLC SERPL-MCNC: 51 MG/DL (ref 40–60)
HDLC SERPL: 2.7 {RATIO} (ref 0–5)
LDLC SERPL CALC-MCNC: 52.2 MG/DL (ref 0–100)
LIPID PROFILE,FLP: ABNORMAL
POTASSIUM SERPL-SCNC: 4.4 MMOL/L (ref 3.5–5.5)
PROT SERPL-MCNC: 7.4 G/DL (ref 6.4–8.2)
SODIUM SERPL-SCNC: 136 MMOL/L (ref 136–145)
TRIGL SERPL-MCNC: 174 MG/DL (ref ?–150)
VLDLC SERPL CALC-MCNC: 34.8 MG/DL

## 2018-01-08 PROCEDURE — 80053 COMPREHEN METABOLIC PANEL: CPT | Performed by: INTERNAL MEDICINE

## 2018-01-08 PROCEDURE — 83036 HEMOGLOBIN GLYCOSYLATED A1C: CPT | Performed by: INTERNAL MEDICINE

## 2018-01-08 PROCEDURE — 80061 LIPID PANEL: CPT | Performed by: INTERNAL MEDICINE

## 2018-01-08 PROCEDURE — 82043 UR ALBUMIN QUANTITATIVE: CPT | Performed by: INTERNAL MEDICINE

## 2018-01-08 NOTE — PROGRESS NOTES
1. Have you been to the ER, urgent care clinic or hospitalized since your last visit? NO.     2. Have you seen or consulted any other health care providers outside of the [address] [place] [address] since your last visit (Include any pap smears or colon screening)? NO      Do you have an Advanced Directive? NO    Would you like information on Advanced Directives?  NO

## 2018-01-09 LAB
CREAT UR-MCNC: 80.57 MG/DL (ref 30–125)
MICROALBUMIN UR-MCNC: 1.2 MG/DL (ref 0–3)
MICROALBUMIN/CREAT UR-RTO: 15 MG/G (ref 0–30)

## 2018-01-09 NOTE — TELEPHONE ENCOUNTER
pls call    Results for orders placed or performed during the hospital encounter of 01/08/18   LIPID PANEL   Result Value Ref Range    LIPID PROFILE          Cholesterol, total 138 <200 MG/DL    Triglyceride 174 (H) <150 MG/DL    HDL Cholesterol 51 40 - 60 MG/DL    LDL, calculated 52.2 0 - 100 MG/DL    VLDL, calculated 34.8 MG/DL    CHOL/HDL Ratio 2.7 0 - 5.0     METABOLIC PANEL, COMPREHENSIVE   Result Value Ref Range    Sodium 136 136 - 145 mmol/L    Potassium 4.4 3.5 - 5.5 mmol/L    Chloride 103 100 - 108 mmol/L    CO2 23 21 - 32 mmol/L    Anion gap 10 3.0 - 18 mmol/L    Glucose 185 (H) 74 - 99 mg/dL    BUN 15 7.0 - 18 MG/DL    Creatinine 0.78 0.6 - 1.3 MG/DL    BUN/Creatinine ratio 19 12 - 20      GFR est AA >60 >60 ml/min/1.73m2    GFR est non-AA >60 >60 ml/min/1.73m2    Calcium 8.6 8.5 - 10.1 MG/DL    Bilirubin, total 0.5 0.2 - 1.0 MG/DL    ALT (SGPT) 63 (H) 16 - 61 U/L    AST (SGOT) 33 15 - 37 U/L    Alk.  phosphatase 61 45 - 117 U/L    Protein, total 7.4 6.4 - 8.2 g/dL    Albumin 4.1 3.4 - 5.0 g/dL    Globulin 3.3 2.0 - 4.0 g/dL    A-G Ratio 1.2 0.8 - 1.7     HEMOGLOBIN A1C W/O EAG   Result Value Ref Range    Hemoglobin A1c 6.7 (H) 4.2 - 5.6 %     a1c slightly higher - diet, exercise, wt loss  Chol great  Liver tests normalizing  No change in plans

## 2018-02-10 DIAGNOSIS — E78.5 DYSLIPIDEMIA: ICD-10-CM

## 2018-02-12 RX ORDER — ATORVASTATIN CALCIUM 10 MG/1
TABLET, FILM COATED ORAL
Qty: 30 TAB | Refills: 4 | Status: SHIPPED | OUTPATIENT
Start: 2018-02-12 | End: 2018-04-09 | Stop reason: SDUPTHER

## 2018-04-09 DIAGNOSIS — E78.5 DYSLIPIDEMIA: ICD-10-CM

## 2018-04-09 NOTE — TELEPHONE ENCOUNTER
----- Message from Sukumar Yeung sent at 4/8/2018 10:15 AM EDT -----  Regarding: Prescription Question  Contact: [phone]  [facility] provides my prescription service. They will be changing to Celeste Swartz sometime around the end of April. Is it possible to change my Atorvastatin Calcium (Lipitor) and Amlodipine (Norvasc) to a 3 month vice 30 day supply. I want to get them filled and be able to allow for some transition time as they make the change. Thanking you in advance.     Sincerely, Eulalio Curtis

## 2018-04-11 RX ORDER — ATORVASTATIN CALCIUM 10 MG/1
TABLET, FILM COATED ORAL
Qty: 90 TAB | Refills: 3 | Status: SHIPPED | OUTPATIENT
Start: 2018-04-11 | End: 2019-04-21 | Stop reason: SDUPTHER

## 2018-04-11 RX ORDER — AMLODIPINE BESYLATE 5 MG/1
TABLET ORAL
Qty: 90 TAB | Refills: 3 | Status: SHIPPED | OUTPATIENT
Start: 2018-04-11 | End: 2019-04-21 | Stop reason: SDUPTHER

## 2018-04-12 ENCOUNTER — OFFICE VISIT (OUTPATIENT)
Dept: INTERNAL MEDICINE CLINIC | Age: 58
End: 2018-04-12

## 2018-04-12 ENCOUNTER — HOSPITAL ENCOUNTER (OUTPATIENT)
Dept: GENERAL RADIOLOGY | Age: 58
Discharge: HOME | End: 2018-04-12
Payer: COMMERCIAL

## 2018-04-12 VITALS
WEIGHT: 256 LBS | DIASTOLIC BLOOD PRESSURE: 90 MMHG | HEIGHT: 69 IN | OXYGEN SATURATION: 97 % | BODY MASS INDEX: 37.92 KG/M2 | SYSTOLIC BLOOD PRESSURE: 134 MMHG | RESPIRATION RATE: 14 BRPM | HEART RATE: 77 BPM | TEMPERATURE: 99.2 F

## 2018-04-12 DIAGNOSIS — R07.89 ATYPICAL CHEST PAIN: Primary | ICD-10-CM

## 2018-04-12 DIAGNOSIS — N63.0 BREAST LUMP: ICD-10-CM

## 2018-04-12 DIAGNOSIS — R07.89 ATYPICAL CHEST PAIN: ICD-10-CM

## 2018-04-12 PROBLEM — E66.01 SEVERE OBESITY (BMI 35.0-39.9) WITH COMORBIDITY (HCC): Status: ACTIVE | Noted: 2018-04-12

## 2018-04-12 PROCEDURE — 71046 X-RAY EXAM CHEST 2 VIEWS: CPT

## 2018-04-12 NOTE — PROGRESS NOTES
61 y.o. WHITE OR  male who presents for evaluation. He noted a knot or nodule in his right chest/breast area which he wanted checked out earlier this week. It was around 2 o'clock outside of the areola although it seems to be better today. No nipple discharge, pain, swelling, redness. There is no Fhx breast disease or ca    Also, he's complaining of vague fleeting discomfort in the bilateral chest in the lateral regions. No exertional component, he hesitates to call it a pain, more of discomfort. Denied any sob, pleurisy, coughing, wheezing, hemoptysis. Can't really reproduce with palpation, no h/o trauma, injury, bruising or rash. Not related to eating, denied any heartburn, dyspepsia    Past Medical History:   Diagnosis Date    Diabetes ([facility] [address] 2016    on basis of fbs>125    Diverticulosis     and polyp 12/13 Dr Magalis Bernal    Dyslipidemia     calculatd 10 year risk score 13.6% (9/15); 11.1% (3/16); repeatedly declined statin    Fatty liver     on biopsy 2000 in [place];  Fib-4 was 1.25 from 3/15    Heel spur     left    Hypertension     IFG (impaired fasting glucose)     Morbid obesity (HCC)     peak weight 255 lbs, bmi 37.6 from 3/12; declined emilia suppressants, med supervised wt loss, bariatric options    Pancreatitis 10/14    and cholecystitis Dr Orantes Gu Tobacco dependence syndrome      Past Surgical History:   Procedure Laterality Date    HX CHOLECYSTECTOMY  12/14    Dr Chasidy Jaimes HX COLONOSCOPY      negative 2000; diverticulosis and polyps 2013 Dr Magalis Bernal    HX HEENT  09/2017    US thyroid negative    HX ORTHOPAEDIC  2015    left medial meniscus tear Dr Zan Cody HX UROLOGICAL      testicular tortion    HX VASECTOMY      VASCULAR SURGERY PROCEDURE UNLIST  9/15    left GSV and EVLT Dr Harish Wynn History     Social History    Marital status:      Spouse name: N/A    Number of children: 1    Years of education: N/A     Occupational History    proj engr civilian working for [employer]      Social History Main Topics    Smoking status: Current Every Day Smoker     Types: Cigars    Smokeless tobacco: Never Used      Comment: smokes on cigar per day, previous hx. of cigarette smoking    Alcohol use Yes      Comment: social    Drug use: No    Sexual activity: Not on file     Other Topics Concern    Not on file     Social History Narrative     Current Outpatient Prescriptions   Medication Sig    amLODIPine (NORVASC) 5 mg tablet take 1 tablet by mouth daily    atorvastatin (LIPITOR) 10 mg tablet TAKE 1 TABLET BY MOUTH DAILY    glucose blood VI test strips (ACCU-CHEK JONATHAN PLUS TEST STRP) strip TEST TWO TIMES A DAY. DX: E11.9    Lancets (ACCU-CHEK FASTCLIX) misc Pt checks blood sugar twice daily, Dx E11.9    Lancets (ACCU-CHEK MULTICLIX LANCET) misc TEST TWO TIMES A DAY. DX:E11.9    coenzyme q10-vitamin e (COQ10 ) 100-100 mg-unit Cap Take  by mouth.  ascorbic acid (VITAMIN C) 500 mg tablet Take  by mouth.  vitamin E (AQUA GEMS) 400 unit capsule Take  by mouth daily. No current facility-administered medications for this visit.       Allergies   Allergen Reactions    Metformin (Bulk) Diarrhea    Pcn [Penicillins] Swelling     REVIEW OF SYSTEMS:   Ophtho  no vision change or eye pain  Oral  no mouth pain, tongue or tooth problems  Ears  no hearing loss, ear pain, fullness, no swallowing problems  Cardiac  no PND, orthopnea, edema, palpitations or syncope  Chest  no breast masses  Resp  no wheezing, chronic coughing, dyspnea  GI  no heartburn, nausea, vomiting, change in bowel habits, bleeding, hemorrhoids  Urinary  no dysuria, hematuria, flank pain, urgency, frequency  Genitals  no genital lesions, discharge, masses, ulceration, warts  Ortho  no swelling, dec ROM, myalgias    Visit Vitals    /90 (BP 1 Location: Left arm, BP Patient Position: Sitting)    Pulse 77    Temp 99.2 °F (37.3 °C) (Oral)    Resp 14    Ht 5' 9\" (1.753 m)  Wt 256 lb (116.1 kg)    SpO2 97%    BMI 37.8 kg/m2   A&O x3  Affect is appropriate. Mood stable  No apparent distress  Anicteric, no JVD, adenopathy or thyromegaly. No carotid bruits or radiated murmur  Lungs clear to auscultation, no wheezes or rales  Heart showed regular rate and rhythm. No murmur, rubs, gallops  Abdomen soft nontender, no hepatosplenomegaly or masses. Extremities without edema. Pulses 1-2+ symmetrically  Chest wall nontender  Chest/breast exam revealed no clear masses although maybe some cystic changes could be felt bilat periareolar areas. No adenopathy    Assessment and plan:  1. Vague chest discomfort. Check cxr r/o bony issues  2. Breast nodule? Will ask opinion Dr Kevin Terrell      Above conditions discussed at length and patient vocalized understanding.   All questions answered to patient satisfaction

## 2018-04-12 NOTE — MR AVS SNAPSHOT
[place] 
 
 
 [address], [address] [place] [address] [facility] 
[phone] Patient: Alexa Beatty MRN: [id number] GWY:[birth year] Visit Information Date & Time Provider Department Dept. Phone Encounter #  
 4/12/2018 11:40 AM Marlin Hernandez MD [facility] [id number] Your Appointments 5/14/2018  9:00 AM  
Office Visit with Marlin Hernandez MD  
[facility] [address]) Appt Note: 4 months labs at [facility] [address], [address] [place] [address] [address]  
  
   
 [address], [address] Upcoming Health Maintenance Date Due Hepatitis C Screening 1960 FOOT EXAM Q1 7/21/1969 DTaP/Tdap/Td series (1 - Tdap) 7/21/1980 EYE EXAM RETINAL OR DILATED Q1 6/15/2018 HEMOGLOBIN A1C Q6M 7/8/2018 MICROALBUMIN Q1 1/8/2019 LIPID PANEL Q1 1/8/2019 COLONOSCOPY 12/13/2023 Allergies as of 4/12/2018  Review Complete On: 4/12/2018 By: [place]obe Severity Noted Reaction Type Reactions Metformin (Bulk)  08/25/2017    Diarrhea Pcn [Penicillins]    Swelling Current Immunizations  Reviewed on 8/25/2017 Name Date Influenza Vaccine (Quad) PF 8/25/2017  3:57 PM, 9/27/2016 Pneumococcal Polysaccharide (PPSV-23) 3/30/2016 Not reviewed this visit Vitals BP Pulse Temp Resp Height(growth percentile) Weight(growth percentile) 134/90 (BP 1 Location: Left arm, BP Patient Position: Sitting) 77 99.2 °F (37.3 °C) (Oral) 14 5' 9\" (1.753 m) 256 lb (116.1 kg) SpO2 BMI Smoking Status 97% 37.8 kg/m2 Current Every Day Smoker Vitals History BMI and BSA Data Body Mass Index Body Surface Area  
 37.8 kg/m 2 2.38 m 2 Preferred Pharmacy Pharmacy Name Phone [facility] [address] [phone] Your Updated Medication List  
  
   
This list is accurate as of 4/12/18 12:34 PM.  Always use your most recent med list. amLODIPine 5 mg tablet Commonly known as:  NORVASC  
take 1 tablet by mouth daily  
  
 atorvastatin 10 mg tablet Commonly known as:  LIPITOR  
TAKE 1 TABLET BY MOUTH DAILY  
  
 COQ10  100-100 mg-unit Cap Generic drug:  coenzyme q10-vitamin e Take  by mouth. glucose blood VI test strips strip Commonly known as:  ACCU-CHEK JONATHAN PLUS TEST STRP  
TEST TWO TIMES A DAY. DX: E11.9 * Lancets Misc Commonly known as:  ACCU-CHEK MULTICLIX LANCET  
TEST TWO TIMES A DAY. DX:E11.9 * Lancets Misc Commonly known as:  ACCU-CHEK FASTCLIX Pt checks blood sugar twice daily, Dx E11.9 VITAMIN C 500 mg tablet Generic drug:  ascorbic acid (vitamin C) Take  by mouth.  
  
 vitamin E 400 unit capsule Commonly known as:  [address] Take  by mouth daily. * Notice: This list has 2 medication(s) that are the same as other medications prescribed for you. Read the directions carefully, and ask your doctor or other care provider to review them with you. Introducing [facility] & HEALTH SERVICES! Dear Nellie Cabral: Thank you for requesting a DeerTech account. Our records indicate that you already have an active DeerTech account. You can access your account anytime at [email or web address] Did you know that you can access your hospital and ER discharge instructions at any time in DeerTech? You can also review all of your test results from your hospital stay or ER visit. Additional Information If you have questions, please visit the Frequently Asked Questions section of the DeerTech website at [email or web address] Remember, DeerTech is NOT to be used for urgent needs. For medical emergencies, dial 911. Now available from your iPhone and Android! Please provide this summary of care documentation to your next provider. Your primary care clinician is listed as Bhargav Arizmendi. If you have any questions after today's visit, please call [phone].

## 2018-04-12 NOTE — PROGRESS NOTES
1. Have you been to the ER, urgent care clinic or hospitalized since your last visit? NO.     2. Have you seen or consulted any other health care providers outside of the [facility] since your last visit (Include any pap smears or colon screening)? NO      Do you have an Advanced Directive? NO    Would you like information on Advanced Directives? NO      Chief Complaint   Patient presents with    Chest injury     Pt stated he has breast swelling. Pt stated ndes under axillary feels puffy ongoing for several months onset. discomfort in breast. pt denies any difficulty breathing and chest pain.

## 2018-04-18 ENCOUNTER — TELEPHONE (OUTPATIENT)
Dept: INTERNAL MEDICINE CLINIC | Age: 58
End: 2018-04-18

## 2018-04-19 NOTE — TELEPHONE ENCOUNTER
pls call    Xray neg         IMPRESSION  IMPRESSION:   No radiographic evidence for acute cardiopulmonary process. .No acute osseous abnormalities are identified.

## 2018-05-07 ENCOUNTER — OFFICE VISIT (OUTPATIENT)
Dept: SURGERY | Age: 58
End: 2018-05-07

## 2018-05-07 VITALS
RESPIRATION RATE: 18 BRPM | WEIGHT: 256 LBS | DIASTOLIC BLOOD PRESSURE: 80 MMHG | HEIGHT: 69 IN | TEMPERATURE: 98 F | SYSTOLIC BLOOD PRESSURE: 144 MMHG | BODY MASS INDEX: 37.92 KG/M2 | OXYGEN SATURATION: 98 % | HEART RATE: 74 BPM

## 2018-05-07 DIAGNOSIS — N63.10 BREAST MASS, RIGHT: Primary | ICD-10-CM

## 2018-05-07 NOTE — MR AVS SNAPSHOT
[facility] 
 
 
 [address] [facility] [address] [facility] [address] 
[phone] Patient: Addison Deleon MRN: [id number] CPX:[birth year] Visit Information Date & Time Provider Department Dept. Phone Encounter #  
 5/7/2018 11:15 AM [facility] [phone] [id number] Your Appointments 5/14/2018  9:00 AM  
Office Visit with Tae Brown MD  
[facility] [address]) Appt Note: 4 months labs at [facility] [address] [facility], [address] [place] [id number] [place] [address] [address]  
  
   
 [address], [address]  
  
    
 5/21/2018  9:45 AM  
Follow Up with Jazlyn Giron MD  
[facility] ([address] [place] [address]) Appt Note: 2 week fu after mammo [address] [facility] [address] [facility] [address]  
[phone]  
  
   
 [address] [place] [address] [address] [address] Upcoming Health Maintenance Date Due Hepatitis C Screening 1960 FOOT EXAM Q1 7/21/1969 DTaP/Tdap/Td series (1 - Tdap) 7/21/1980 EYE EXAM RETINAL OR DILATED Q1 6/15/2018 HEMOGLOBIN A1C Q6M 7/8/2018 Influenza Age 5 to Adult 8/1/2018 MICROALBUMIN Q1 1/8/2019 LIPID PANEL Q1 1/8/2019 COLONOSCOPY 12/13/2023 Allergies as of 5/7/2018  Review Complete On: 5/7/2018 By: Jazlyn Giron MD  
  
 Severity Noted Reaction Type Reactions Metformin (Bulk)  08/25/2017    Diarrhea Pcn [Penicillins]    Swelling Current Immunizations  Reviewed on 8/25/2017 Name Date Influenza Vaccine (Quad) PF 8/25/2017  3:57 PM, 9/27/2016 Pneumococcal Polysaccharide (PPSV-23) 3/30/2016 Not reviewed this visit You Were Diagnosed With   
  
 Codes Comments Breast mass, right    -  Primary ICD-10-CM: N63.10 ICD-9-CM: 611.72 Vitals BP Pulse Temp Resp Height(growth percentile) Weight(growth percentile) 144/80 74 98 °F (36.7 °C) (Oral) 18 5' 9\" (1.753 m) 256 lb (116.1 kg) SpO2 BMI Smoking Status 98% 37.8 kg/m2 Current Every Day Smoker Vitals History BMI and BSA Data Body Mass Index Body Surface Area  
 37.8 kg/m 2 2.38 m 2 Your Updated Medication List  
  
   
This list is accurate as of 5/7/18 11:45 AM.  Always use your most recent med list. amLODIPine 5 mg tablet Commonly known as:  NORVASC  
take 1 tablet by mouth daily  
  
 atorvastatin 10 mg tablet Commonly known as:  LIPITOR  
TAKE 1 TABLET BY MOUTH DAILY  
  
 COQ10  100-100 mg-unit Cap Generic drug:  coenzyme q10-vitamin e Take  by mouth. glucose blood VI test strips strip Commonly known as:  ACCU-CHEK JONATHAN PLUS TEST STRP  
TEST TWO TIMES A DAY. DX: E11.9 * Lancets Misc Commonly known as:  ACCU-CHEK MULTICLIX LANCET  
TEST TWO TIMES A DAY. DX:E11.9 * Lancets Misc Commonly known as:  ACCU-CHEK FASTCLIX Pt checks blood sugar twice daily, Dx E11.9 VITAMIN C 500 mg tablet Generic drug:  ascorbic acid (vitamin C) Take  by mouth.  
  
 vitamin E 400 unit capsule Commonly known as:  [address] Take  by mouth daily. * Notice: This list has 2 medication(s) that are the same as other medications prescribed for you. Read the directions carefully, and ask your doctor or other care provider to review them with you. To-Do List   
 05/07/2018 Imaging:  IMANI MAMMO RT DX INCL CAD Introducing [facility] & HEALTH SERVICES! Dear Alka Saul: Thank you for requesting a Pict[employer] account. Our records indicate that you already have an active Pictarine account. You can access your account anytime at [email or web address] Did you know that you can access your hospital and ER discharge instructions at any time in Pictarine? You can also review all of your test results from your hospital stay or ER visit. Additional Information If you have questions, please visit the Frequently Asked Questions section of the [employer] website at [email or web address] Remember, Tuloko is NOT to be used for urgent needs. For medical emergencies, dial 911. Now available from your iPhone and Android! Please provide this summary of care documentation to your next provider. Your primary care clinician is listed as Damien Jeff. If you have any questions after today's visit, please call [phone].

## 2018-05-07 NOTE — PROGRESS NOTES
Phong Wrightshaina has been given the following recommendations today due to his elevated BP reading: please follow up with PCP regarding elevated BP.

## 2018-05-07 NOTE — PROGRESS NOTES
Progress Note    Patient: Frannie Redd  MRN: [id number]  SSN: [id number]   YOB: 1960  Age: 61 y.o. Sex: male     Chief Complaint   Patient presents with    Breast Mass     right breast lump       HPI    Mr Nadia Angel is a 50-year-old gentleman who several weeks ago developed a mass near his right nipple that stayed around for a few days and then left. It did not appear to be infective in nature he did not have nipple drainage, cellulitis, or tenderness. Was referred here for further workup. He has no risks of breast cancer in the family. No history of radiation to the chest wall or other confounding factors. Past Medical History:   Diagnosis Date    Diabetes ([facility] [address] 2016    on basis of fbs>125    Diverticulosis     and polyp 12/13 Dr Carmen Flores    Dyslipidemia     calculatd 10 year risk score 13.6% (9/15); 11.1% (3/16); repeatedly declined statin    Fatty liver     on biopsy 2000 in ;  Fib-4 was 1.25 from 3/15    Heel spur     left    Hypertension     IFG (impaired fasting glucose)     Morbid obesity (HCC)     peak weight 255 lbs, bmi 37.6 from 3/12; declined emilia suppressants, med supervised wt loss, bariatric options    Pancreatitis 10/14    and cholecystitis Dr Pop Ramos Tobacco dependence syndrome      Past Surgical History:   Procedure Laterality Date    HX CHOLECYSTECTOMY  12/14    Dr Charlee Villa      negative 2000; diverticulosis and polyps 2013 Dr Carmen Flores    HX HEENT  09/2017    US thyroid negative    HX ORTHOPAEDIC  2015    left medial meniscus tear Dr Gera Arriaza HX UROLOGICAL      testicular tortion    HX VASECTOMY      VASCULAR SURGERY PROCEDURE UNLIST  9/15    left GSV and EVLT Dr Benz Cocks     Allergies   Allergen Reactions    Metformin (Bulk) Diarrhea    Pcn [Penicillins] Swelling     Current Outpatient Prescriptions   Medication Sig Dispense Refill    amLODIPine (NORVASC) 5 mg tablet take 1 tablet by mouth daily 90 Tab 3    atorvastatin (LIPITOR) 10 mg tablet TAKE 1 TABLET BY MOUTH DAILY 90 Tab 3    glucose blood VI test strips (ACCU-CHEK JONATHAN PLUS TEST STRP) strip TEST TWO TIMES A DAY. DX: E11.9 100 Strip 3    Lancets (ACCU-CHEK FASTCLIX) misc Pt checks blood sugar twice daily, Dx E11.9 180 Each 11    Lancets (ACCU-CHEK MULTICLIX LANCET) misc TEST TWO TIMES A DAY. DX:E11.9 100 Each 3    coenzyme q10-vitamin e (COQ10 ) 100-100 mg-unit Cap Take  by mouth.  ascorbic acid (VITAMIN C) 500 mg tablet Take  by mouth.  vitamin E (AQUA GEMS) 400 unit capsule Take  by mouth daily.          Social History     Social History    Marital status:      Spouse name: N/A    Number of children: 1    Years of education: N/A     Occupational History    proj engr civilian working for [employer]      Social History Main Topics    Smoking status: Current Every Day Smoker     Types: Cigars    Smokeless tobacco: Never Used      Comment: smokes on cigar per day, previous hx. of cigarette smoking    Alcohol use Yes      Comment: social    Drug use: No    Sexual activity: Not on file     Other Topics Concern    Not on file     Social History Narrative     Family History   Problem Relation Age of Onset    Elevated Lipids Mother     Hypertension Father     Diabetes Father     Asthma Sister     Colon Cancer Maternal Grandmother     Colon Cancer Paternal Grandmother     Cancer Maternal Grandfather      colon    Cancer Paternal Grandfather      colon    Heart Disease Paternal Grandfather          Review of systems:  Patient denies any reflux, emesis, abdominal pain, change in bowel habits, hematochezia, melena, fever, weight loss, fatigue chills, dermatitis, abnormal moles, change in vision, vertigo, epistaxis, dysphagia, hoarseness, chest pain, palpitations, hypertension, edema, cough, shortness of breath, wheezing, hemoptysis, snoring, hematuria, diabetes, thyroid disease, anemia, bruising, history of blood transfusion, dizziness, headache, or fainting. Physical Examination    Well developed well nourished male in no apparent distress  Visit Vitals    /80    Pulse 74    Temp 98 °F (36.7 °C) (Oral)    Resp 18    Ht 5' 9\" (1.753 m)    Wt 116.1 kg (256 lb)    SpO2 98%    BMI 37.8 kg/m2      Head: normocephalic, atraumatic  Mouth: Clear, no overt lesions, oral mucosa pink and moist  Neck: supple, no masses, no adenopathy or carotid bruits, trachea midline  Resp: clear to auscultation bilaterally, no wheeze, rhonchi or rales, excursions normal and symmetrical  Cardio: Regular rate and rhythm, no murmurs, clicks, gallops or rubs, no edema or varicosities  Abdomen: soft, nontender, nondistended, normoactive bowel sounds, no hernias, no hepatosplenomegaly,   Back: Deferred  Extremeties: warm, well-perfused, no tenderness or swelling, normal gait/station  Neuro: sensation and strength grossly intact and symmetrical  Psych: alert and oriented to person, place and time  Breast exam bilateral exam without dominant mass, skin change, nipple discharge or lymphadenopathy    IMPRESSION  Unusual history of right breast mass for several weeks and then it is gone likely representing subcutaneous cyst or a duct cyst in the breast.    PLAN  No orders of the defined types were placed in this encounter. Ultrasound in office followed by mammogram  Eliezer Carias MD    Procedure note  Mr. Laura Saldaña was taken to the ultrasound suite and real-time imaging of his right breast 3 o'clock position 1 cm from the nipple was performed. No lesion was noted.

## 2018-05-10 DIAGNOSIS — N63.10 BREAST MASS, RIGHT: Primary | ICD-10-CM

## 2018-05-11 ENCOUNTER — HOSPITAL ENCOUNTER (OUTPATIENT)
Dept: ULTRASOUND IMAGING | Age: 58
Discharge: HOME | End: 2018-05-11
Payer: COMMERCIAL

## 2018-05-11 ENCOUNTER — HOSPITAL ENCOUNTER (OUTPATIENT)
Dept: MAMMOGRAPHY | Age: 58
Discharge: HOME | End: 2018-05-11
Payer: COMMERCIAL

## 2018-05-11 DIAGNOSIS — N63.10 BREAST MASS, RIGHT: ICD-10-CM

## 2018-05-11 PROCEDURE — 77066 DX MAMMO INCL CAD BI: CPT

## 2018-05-17 ENCOUNTER — HOSPITAL ENCOUNTER (OUTPATIENT)
Dept: LAB | Age: 58
Discharge: HOME | End: 2018-05-17
Payer: COMMERCIAL

## 2018-05-17 DIAGNOSIS — E11.9 CONTROLLED TYPE 2 DIABETES MELLITUS WITHOUT COMPLICATION, WITHOUT LONG-TERM CURRENT USE OF INSULIN (HCC): ICD-10-CM

## 2018-05-17 LAB
ALBUMIN SERPL-MCNC: 3.8 G/DL (ref 3.4–5)
ALBUMIN/GLOB SERPL: 1.1 {RATIO} (ref 0.8–1.7)
ALP SERPL-CCNC: 60 U/L (ref 45–117)
ALT SERPL-CCNC: 77 U/L (ref 16–61)
ANION GAP SERPL CALC-SCNC: 8 MMOL/L (ref 3–18)
AST SERPL-CCNC: 49 U/L (ref 15–37)
BILIRUB SERPL-MCNC: 0.8 MG/DL (ref 0.2–1)
BUN SERPL-MCNC: 18 MG/DL (ref 7–18)
BUN/CREAT SERPL: 24 (ref 12–20)
CALCIUM SERPL-MCNC: 8.5 MG/DL (ref 8.5–10.1)
CHLORIDE SERPL-SCNC: 104 MMOL/L (ref 100–108)
CHOLEST SERPL-MCNC: 118 MG/DL
CO2 SERPL-SCNC: 26 MMOL/L (ref 21–32)
CREAT SERPL-MCNC: 0.75 MG/DL (ref 0.6–1.3)
GLOBULIN SER CALC-MCNC: 3.4 G/DL (ref 2–4)
GLUCOSE SERPL-MCNC: 170 MG/DL (ref 74–99)
HBA1C MFR BLD: 7.2 % (ref 4.2–5.6)
HDLC SERPL-MCNC: 40 MG/DL (ref 40–60)
HDLC SERPL: 3 {RATIO} (ref 0–5)
LDLC SERPL CALC-MCNC: 49.6 MG/DL (ref 0–100)
LIPID PROFILE,FLP: NORMAL
POTASSIUM SERPL-SCNC: 4.2 MMOL/L (ref 3.5–5.5)
PROT SERPL-MCNC: 7.2 G/DL (ref 6.4–8.2)
PSA SERPL-MCNC: 0.2 NG/ML (ref 0–4)
SODIUM SERPL-SCNC: 138 MMOL/L (ref 136–145)
TRIGL SERPL-MCNC: 142 MG/DL (ref ?–150)
VLDLC SERPL CALC-MCNC: 28.4 MG/DL

## 2018-05-17 PROCEDURE — 80061 LIPID PANEL: CPT | Performed by: INTERNAL MEDICINE

## 2018-05-17 PROCEDURE — 83036 HEMOGLOBIN GLYCOSYLATED A1C: CPT | Performed by: INTERNAL MEDICINE

## 2018-05-17 PROCEDURE — 84153 ASSAY OF PSA TOTAL: CPT | Performed by: INTERNAL MEDICINE

## 2018-05-17 PROCEDURE — 80053 COMPREHEN METABOLIC PANEL: CPT | Performed by: INTERNAL MEDICINE

## 2018-05-17 PROCEDURE — 36415 COLL VENOUS BLD VENIPUNCTURE: CPT | Performed by: INTERNAL MEDICINE

## 2018-05-19 NOTE — PROGRESS NOTES
61 y.o. WHITE OR  male who presents for evaluation. He is off metformin due to intol. FBS in the 110-130 range. Denies polyuria, polydipsia, nocturia, vision change. No success w attempts at wt loss as below    Vitals 5/22/2018 5/7/2018 5/7/2018 4/12/2018 1/8/2018   Weight 258 lb  256 lb 256 lb 255 lb 12.8 oz     Vitals 8/25/2017   Weight 256 lb     He had complained of a 'discomfort' in the superior portion of the right chest just at the insertion point in the clavicle extending medially into the sternum. Still nonexertional , nsaids don't help, cxr from 4/18 showed no bony pathology. No associated resp sx, not related to eating, denied any neck pain or radicular sx down the arms. No wheezing, dyspnea, chronic cough    No gi or gu complaints    He saw Dr Kalee Diggs regarding the breast issue and both US and mammo were neg    Past Medical History:   Diagnosis Date    Diabetes ([facility] [address] 2016    on basis of fbs>125    Diverticulosis     and polyp 12/13 Dr Margarito Barba    Dyslipidemia     calculatd 10 year risk score 13.6% (9/15); 11.1% (3/16); repeatedly declined statin    Fatty liver     on biopsy 2000 in ;  Fib-4 was 1.25 from 3/15    Heel spur     left    Hypertension     IFG (impaired fasting glucose)     Morbid obesity (HCC)     peak weight 255 lbs, bmi 37.6 from 3/12; declined emilia suppressants, med supervised wt loss, bariatric options    Pancreatitis 10/14    and cholecystitis Dr Patel Seen Tobacco dependence syndrome      Past Surgical History:   Procedure Laterality Date    HX CHOLECYSTECTOMY  12/14    Dr Mayra Villeda HX COLONOSCOPY      negative 2000; diverticulosis and polyps 2013 Dr Margarito Barba    HX HEENT  09/2017    US thyroid negative    HX ORTHOPAEDIC  2015    left medial meniscus tear Dr Abdirahman Davis HX UROLOGICAL      testicular tortion    HX VASECTOMY      VASCULAR SURGERY PROCEDURE UNLIST  9/15    left GSV and EVLT Dr Yumiko Ramachandran Marital status:      Spouse name: N/A    Number of children: 1    Years of education: N/A     Occupational History    proj engr civilian working for [employer]      Social History Main Topics    Smoking status: Current Every Day Smoker     Types: Cigars    Smokeless tobacco: Never Used      Comment: smokes on cigar per day, previous hx. of cigarette smoking    Alcohol use Yes      Comment: social    Drug use: No    Sexual activity: Not on file     Other Topics Concern    Not on file     Social History Narrative     Current Outpatient Prescriptions   Medication Sig    amLODIPine (NORVASC) 5 mg tablet take 1 tablet by mouth daily    atorvastatin (LIPITOR) 10 mg tablet TAKE 1 TABLET BY MOUTH DAILY    glucose blood VI test strips (ACCU-CHEK JONATHAN PLUS TEST STRP) strip TEST TWO TIMES A DAY. DX: E11.9    Lancets (ACCU-CHEK FASTCLIX) misc Pt checks blood sugar twice daily, Dx E11.9    Lancets (ACCU-CHEK MULTICLIX LANCET) misc TEST TWO TIMES A DAY. DX:E11.9    coenzyme q10-vitamin e (COQ10 ) 100-100 mg-unit Cap Take  by mouth.  ascorbic acid (VITAMIN C) 500 mg tablet Take  by mouth.  vitamin E (AQUA GEMS) 400 unit capsule Take  by mouth daily. No current facility-administered medications for this visit. Allergies   Allergen Reactions    Metformin (Bulk) Diarrhea    Pcn [Penicillins] Swelling     REVIEW OF SYSTEMS: colo 12/13 Dr Cantrell Legacy  Ophtho  no vision change or eye pain  Oral  no mouth pain, tongue or tooth problems  Ears  no hearing loss, ear pain, fullness, no swallowing problems  Cardiac  no CP, PND, orthopnea, edema, palpitations or syncope  Chest  no breast masses.    Resp  no wheezing, chronic coughing, dyspnea  GI  no heartburn, nausea, vomiting, change in bowel habits, bleeding, hemorrhoids  Urinary  no dysuria, hematuria, flank pain, urgency, frequency  Ortho  no swelling, dec ROM, myalgias  Neuro  no focal weakness, numbness, paresthesias, incoordination, ataxia, involuntary movements  Endo - no polyuria, polydipsia, nocturia, hot flashes    Visit Vitals    /76 (BP 1 Location: Right arm, BP Patient Position: Sitting)    Pulse 76    Temp 99.9 °F (37.7 °C) (Oral)    Resp 16    Ht 5' 9\" (1.753 m)    Wt 258 lb (117 kg)    SpO2 98%    BMI 38.1 kg/m2   A&O x3  Affect is appropriate. Mood stable  No apparent distress  Anicteric, no JVD, adenopathy. No carotid bruits or radiated murmur  Lungs clear to auscultation, no wheezes or rales  Chest wall minimal tenderness in the right upper/medial chest area, no bruising, swelling, rash  Heart showed regular rate and rhythm. No murmur, rubs, gallops  Abdomen soft nontender, no hepatosplenomegaly or masses. Extremities without edema, venous varicosities noted.   Pulses 1-2+ symmetrically    LABS    From 1/07 showed   gluc 108, cr 0.90,             alt 110,  hba1c 5.3,                   chol 212, tg 129, hdl 51, ldl-c 135, wbc 8.1, hb 17.7, plt 214, ast 67  From 3/12 showed   gluc 128, cr 0.92, gfr 95,                hba1c 5.7, ldl-p 1412,                 wbc 7.0, hb 17.0, plt 211, tsh 2.13  From 4/12 showed   gluc 95,   cr 0.89, gfr 98,  alt 57,    hba1c 5.7,                   chol 163, tg 120, hdl 47, ldl-c 92  From 6/12 showed   gluc 90,   cr 0.80, gfr 85,  alt 19,    hba1c 5.2, ldl-p 1416  From 1/13 showed   gluc 96,   cr 0.88, gfr 98,  alt 24,    hba1c 5.1, ldl-p 1421, chol 185, tg 125, hdl 49, ldl-c 111   From 10/14 showed gluc 123, cr 1.00, gfr>60, alt 59,                    wbc 9.6, hb 16.7, plt 222, ast 47, ap 69, tb 4.7, lip 7684, ck/trop neg  From 3/15 showed   gluc 118, cr 0.81, gfr>60, alt 57,    hba1c 5.9,        chol 184, tg 200, hdl 44, ldl-c 100, wbc 8.0, hb 17.1, plt 216, tsh 2.23  From 9/15 showed   gluc 117, cr 0.90, gfr 95,       hba1c 6.0,        chol 200, tg 176, hdl 46, ldl-c 119  From 3/16 showed           hba1c 5.7,        chol 169, tg 154, hdl 45, ldl-c 93,          tsh 2.09  From 9/16 showed gluc 132, cr 0.77, gfr>60,       hba1c 5.8,        chol 162, tg 160, hdl 43, ldl-c 87,  wbc 8.3, hb 16.3, plt 210  From 4/17 showed   gluc 140, cr 0.80, gfr>60,       hba1c 6.5,               umar 12  From 8/17 showed   gluc 145, cr 0.82, gfr>60,       hba1c 6.3,        chol 174, tg 174, hdl 44, ldl-c 95  From 11/17 showed         alt 82,                 ast 44, ap 57, tb 0.7  From 1/18 showed   gluc 185, cr 0.78, gfr>60,  alt 63,   hba1c 6.7,        chol 138, tg 174, hdl 51, ldl-c 52    Results for orders placed or performed during the hospital encounter of 05/17/18   LIPID PANEL   Result Value Ref Range    LIPID PROFILE          Cholesterol, total 118 <200 MG/DL    Triglyceride 142 <150 MG/DL    HDL Cholesterol 40 40 - 60 MG/DL    LDL, calculated 49.6 0 - 100 MG/DL    VLDL, calculated 28.4 MG/DL    CHOL/HDL Ratio 3.0 0 - 5.0     METABOLIC PANEL, COMPREHENSIVE   Result Value Ref Range    Sodium 138 136 - 145 mmol/L    Potassium 4.2 3.5 - 5.5 mmol/L    Chloride 104 100 - 108 mmol/L    CO2 26 21 - 32 mmol/L    Anion gap 8 3.0 - 18 mmol/L    Glucose 170 (H) 74 - 99 mg/dL    BUN 18 7.0 - 18 MG/DL    Creatinine 0.75 0.6 - 1.3 MG/DL    BUN/Creatinine ratio 24 (H) 12 - 20      GFR est AA >60 >60 ml/min/1.73m2    GFR est non-AA >60 >60 ml/min/1.73m2    Calcium 8.5 8.5 - 10.1 MG/DL    Bilirubin, total 0.8 0.2 - 1.0 MG/DL    ALT (SGPT) 77 (H) 16 - 61 U/L    AST (SGOT) 49 (H) 15 - 37 U/L    Alk.  phosphatase 60 45 - 117 U/L    Protein, total 7.2 6.4 - 8.2 g/dL    Albumin 3.8 3.4 - 5.0 g/dL    Globulin 3.4 2.0 - 4.0 g/dL    A-G Ratio 1.1 0.8 - 1.7     HEMOGLOBIN A1C W/O EAG   Result Value Ref Range    Hemoglobin A1c 7.2 (H) 4.2 - 5.6 %   PSA, DIAGNOSTIC (PROSTATE SPECIFIC AG)   Result Value Ref Range    Prostate Specific Ag 0.2 0.0 - 4.0 ng/mL     Patient Active Problem List   Diagnosis Code    Dyslipidemia E78.5    Colon polyp Dr Arun White 12/13 K63.5    Chronic venous hypertension without complications X03.438    Varicose veins of lower extremities with other complications F56.999    Primary hypertension I10    Tobacco dependence syndrome F17.200    Controlled type 2 diabetes mellitus E11.9    Diverticulosis  K57.30    Severe obesity (BMI 35.0-39. 9) with comorbidity ([facility] [address] E66.01    Breast mass, right N63.10     Assessment and plan:  1. Hypertension. Continue current   2. Obesity. Not interested in meds, bariatric options or medically supervised intensive wt loss program.  Intermittent fasting discussed at length. Explained the concepts in detail. Went over possible physiologic changes that could occur and how that would possibly impact his situation in a positive way. Discussed 16:8 program in particular. We also went over the differences between hunger and faith hypoglycemia and he will need to be watchful with the diabetes. He is already doing low insulin index being on diabetic diet. He will do some more research and consider implementing in the near future, standard handout given  3. Fatty liver. Wt loss  4. DM. Discussed trying the next medication but he decided to hold off as he will do his best with the IF. Recheck in 3 mos. F/u ophth  5. Dyslipidemia. Lifestyle and dietary measures  6. Smoking cessation reiterated  7. Vascular. F/U Dr Iwona Sims  8. Persistent chest wall pain? Will sched bone scan      RTC 8/18    Above conditions discussed at length and patient vocalized understanding.   All questions answered to patient satisfaction

## 2018-05-22 ENCOUNTER — OFFICE VISIT (OUTPATIENT)
Dept: INTERNAL MEDICINE CLINIC | Age: 58
End: 2018-05-22

## 2018-05-22 VITALS
WEIGHT: 258 LBS | HEART RATE: 76 BPM | SYSTOLIC BLOOD PRESSURE: 122 MMHG | OXYGEN SATURATION: 98 % | BODY MASS INDEX: 38.21 KG/M2 | HEIGHT: 69 IN | DIASTOLIC BLOOD PRESSURE: 76 MMHG | TEMPERATURE: 99.9 F | RESPIRATION RATE: 16 BRPM

## 2018-05-22 DIAGNOSIS — E66.01 SEVERE OBESITY (BMI 35.0-39.9) WITH COMORBIDITY (HCC): ICD-10-CM

## 2018-05-22 DIAGNOSIS — I10 ESSENTIAL HYPERTENSION WITH GOAL BLOOD PRESSURE LESS THAN 140/90: ICD-10-CM

## 2018-05-22 DIAGNOSIS — R07.89 CHEST WALL PAIN: ICD-10-CM

## 2018-05-22 DIAGNOSIS — E11.9 CONTROLLED TYPE 2 DIABETES MELLITUS WITHOUT COMPLICATION, WITHOUT LONG-TERM CURRENT USE OF INSULIN (HCC): Primary | ICD-10-CM

## 2018-05-22 DIAGNOSIS — E78.5 DYSLIPIDEMIA: ICD-10-CM

## 2018-05-22 DIAGNOSIS — I87.303 CHRONIC VENOUS HYPERTENSION INVOLVING BOTH SIDES: ICD-10-CM

## 2018-05-22 NOTE — MR AVS SNAPSHOT
[facility] 
 
 
 [address], [address] [place] [address] [facility] 
[phone] Patient: Rosy Bush MRN: [id number] Visit Information Date & Time Provider Department Dept. Phone Encounter #  
 5/22/2018  8:40 AM Desiree Nichole MD [facility] [phone] Your Appointments 9/25/2018  9:20 AM  
Office Visit with Desiree Nichole MD  
[facility] 4 month labs at [facility] [address], [address] [place] [address] [address] [place]  
  
   
 [address], [address] Upcoming Health Maintenance Date Due Hepatitis C Screening 1960 FOOT EXAM Q1 7/21/1969 DTaP/Tdap/Td series (1 - Tdap) 7/21/1980 EYE EXAM RETINAL OR DILATED Q1 5/22/2019* Influenza Age 5 to Adult 8/1/2018 HEMOGLOBIN A1C Q6M 11/17/2018 MICROALBUMIN Q1 1/8/2019 LIPID PANEL Q1 5/17/2019 COLONOSCOPY 12/13/2023 *Topic was postponed. The date shown is not the original due date. Allergies as of 5/22/2018  Review Complete On: 5/22/2018 By: Shasta Dee Severity Noted Reaction Type Reactions Metformin (Bulk)  08/25/2017    Diarrhea Pcn [Penicillins]    Swelling Current Immunizations  Reviewed on 8/25/2017 Name Date Influenza Vaccine (Quad) PF 8/25/2017  3:57 PM, 9/27/2016 Pneumococcal Polysaccharide (PPSV-23) 3/30/2016 Not reviewed this visit Vitals BP Pulse Temp Resp Height(growth percentile) Weight(growth percentile) 122/76 (BP 1 Location: Right arm, BP Patient Position: Sitting) 76 99.9 °F (37.7 °C) (Oral) 16 5' 9\" (1.753 m) 258 lb (117 kg) SpO2 BMI Smoking Status 98% 38.1 kg/m2 Current Every Day Smoker BMI and BSA Data Body Mass Index Body Surface Area  
 38.1 kg/m 2 2.39 m 2 Your Updated Medication List  
  
   
 This list is accurate as of 5/22/18  9:19 AM.  Always use your most recent med list. amLODIPine 5 mg tablet Commonly known as:  NORVASC  
take 1 tablet by mouth daily  
  
 atorvastatin 10 mg tablet Commonly known as:  LIPITOR  
TAKE 1 TABLET BY MOUTH DAILY  
  
 COQ10  100-100 mg-unit Cap Generic drug:  coenzyme q10-vitamin e Take  by mouth. glucose blood VI test strips strip Commonly known as:  ACCU-CHEK JONATHAN PLUS TEST STRP  
TEST TWO TIMES A DAY. DX: E11.9 * Lancets Misc Commonly known as:  ACCU-CHEK MULTICLIX LANCET  
TEST TWO TIMES A DAY. DX:E11.9 * Lancets Misc Commonly known as:  ACCU-CHEK FASTCLIX Pt checks blood sugar twice daily, Dx E11.9 VITAMIN C 500 mg tablet Generic drug:  ascorbic acid (vitamin C) Take  by mouth.  
  
 vitamin E 400 unit capsule Commonly known as:  [address] Take  by mouth daily. * Notice: This list has 2 medication(s) that are the same as other medications prescribed for you. Read the directions carefully, and ask your doctor or other care provider to review them with you. Introducing [facility]! Dear Alverto Allred: Thank you for requesting a [employer] account. Our records indicate that you already have an active [employer] account. You can access your account anytime at [email or web address] Did you know that you can access your hospital and ER discharge instructions at any time in [employer]? You can also review all of your test results from your hospital stay or ER visit. Additional Information If you have questions, please visit the Frequently Asked Questions section of the [employer] website at [email or web address] Remember, [employer] is NOT to be used for urgent needs. For medical emergencies, dial 911. Now available from your iPhone and Android! Please provide this summary of care documentation to your next provider. Your primary care clinician is listed as Prachi Richards. If you have any questions after today's visit, please call [phone].

## 2018-05-22 NOTE — PROGRESS NOTES
1. Have you been to the ER, urgent care clinic or hospitalized since your last visit? NO.     2. Have you seen or consulted any other health care providers outside of the [address] [place] [address] since your last visit (Include any pap smears or colon screening)? NO      Do you have an Advanced Directive? NO    Would you like information on Advanced Directives?  NO    Chief Complaint   Patient presents with    Diabetes     3 month follow up with labs

## 2018-05-29 ENCOUNTER — HOSPITAL ENCOUNTER (OUTPATIENT)
Dept: NUCLEAR MEDICINE | Age: 58
Discharge: HOME | End: 2018-05-29
Attending: INTERNAL MEDICINE
Payer: COMMERCIAL

## 2018-05-29 DIAGNOSIS — R07.89 CHEST WALL PAIN: ICD-10-CM

## 2018-05-29 PROCEDURE — 78306 BONE IMAGING WHOLE BODY: CPT

## 2018-06-03 ENCOUNTER — TELEPHONE (OUTPATIENT)
Dept: INTERNAL MEDICINE CLINIC | Age: 58
End: 2018-06-03

## 2018-06-03 DIAGNOSIS — M19.90 ARTHRITIS: Primary | ICD-10-CM

## 2018-06-03 NOTE — TELEPHONE ENCOUNTER
pls call    Bone scan shows joints where the collar bone attaches to the sternum R>L inflamed - probably arthritis    Not sure how to treat if nsaids have not helped  Consider rheum eval for opinion? ?    sched dr Livier Jiang group if amenable

## 2018-06-04 NOTE — TELEPHONE ENCOUNTER
Pt aware of message below and verbalized understanding. Pt stated he is not taking the NSAIDS all the time. He would like to be evaluated by rheumatology.

## 2018-08-07 ENCOUNTER — HOSPITAL ENCOUNTER (OUTPATIENT)
Dept: NON INVASIVE DIAGNOSTICS | Age: 58
Discharge: HOME | End: 2018-08-07
Attending: INTERNAL MEDICINE
Payer: COMMERCIAL

## 2018-08-07 VITALS
BODY MASS INDEX: 38.21 KG/M2 | WEIGHT: 258 LBS | SYSTOLIC BLOOD PRESSURE: 122 MMHG | DIASTOLIC BLOOD PRESSURE: 76 MMHG | HEIGHT: 69 IN

## 2018-08-07 DIAGNOSIS — R01.1 SYSTOLIC MURMUR: ICD-10-CM

## 2018-08-07 LAB
ECHO AO ROOT DIAM: 3.55 CM
ECHO LA AREA 4C: 11.5 CM2
ECHO LA VOL 2C: 61.6 ML (ref 18–58)
ECHO LA VOL 4C: 22.56 ML (ref 18–58)
ECHO LA VOL BP: 43.54 ML (ref 18–58)
ECHO LA VOL/BSA BIPLANE: 18.92 ML/M2
ECHO LA VOLUME INDEX A2C: 26.76 ML/M2
ECHO LA VOLUME INDEX A4C: 9.8 ML/M2
ECHO LV E' LATERAL VELOCITY: 7.47 CM/S
ECHO LV E' SEPTAL VELOCITY: 6.95 CM/S
ECHO LV INTERNAL DIMENSION DIASTOLIC: 3.69 CM (ref 4.2–5.9)
ECHO LV INTERNAL DIMENSION SYSTOLIC: 2.57 CM
ECHO LV IVSD: 1.41 CM (ref 0.6–1)
ECHO LV MASS 2D: 223.4 G (ref 88–224)
ECHO LV MASS INDEX 2D: 97.1 G/M2
ECHO LV POSTERIOR WALL DIASTOLIC: 1.43 CM (ref 0.6–1)
ECHO LVOT DIAM: 2.14 CM
ECHO LVOT PEAK GRADIENT: 6.6 MMHG
ECHO LVOT PEAK VELOCITY: 128.72 CM/S
ECHO LVOT VTI: 24.78 CM
ECHO MV A VELOCITY: 77.17 CM/S
ECHO MV E DECELERATION TIME (DT): 256 MS
ECHO MV E VELOCITY: 0.58 CM/S
ECHO MV E/A RATIO: 0.01
ECHO MV E/E' LATERAL: 0.08
ECHO MV E/E' RATIO (AVERAGED): 0.08
ECHO MV E/E' SEPTAL: 0.08
ECHO PVEIN A DURATION: 151.4 MS
ECHO PVEIN A VELOCITY: 33.76 CM/S
ECHO RV TAPSE: 2.5 CM (ref 1.5–2)
ECHO TV REGURGITANT MAX VELOCITY: 254.4 CM/S
ECHO TV REGURGITANT PEAK GRADIENT: 25.9 MMHG

## 2018-08-07 PROCEDURE — 93306 TTE W/DOPPLER COMPLETE: CPT

## 2018-09-18 NOTE — PROGRESS NOTES
60 y.o. WHITE OR  male who presents for evaluation. He is off metformin due to intol. FBS in the 110-130 range. Denies polyuria, polydipsia, nocturia, vision change. No success w attempts at wt loss as below Vitals 5/22/2018 5/7/2018 5/7/2018 4/12/2018 1/8/2018 Weight 258 lb  256 lb 256 lb 255 lb 12.8 oz Vitals 8/25/2017 Weight 256 lb He had complained of a 'discomfort' in the superior portion of the right chest just at the insertion point in the clavicle extending medially into the sternum. Still nonexertional , nsaids don't help, cxr from 4/18 showed no bony pathology. No associated resp sx, not related to eating, denied any neck pain or radicular sx down the arms. No wheezing, dyspnea, chronic cough No gi or gu complaints He saw Dr Duncan Bowers regarding the breast issue and both US and mammo were neg 
 
IF 5/18 Past Medical History:  
Diagnosis Date  Diabetes ([facility] [address] 2016  
 on basis of fbs>125  Diverticulosis   
 and polyp 12/13 Dr Brianda Cabezas  Dyslipidemia   
 calculatd 10 year risk score 13.6% (9/15); 11.1% (3/16); repeatedly declined statin  Fatty liver   
 on biopsy 2000 in ; Fib-4 was 1.25 from 3/15  
 H/O echocardiogram 08/2018  
 ef 61%, mild conc lvh, no wma, tr MR, pap 29mmHg  Heel spur   
 left  Hypertension  IFG (impaired fasting glucose)  Morbid obesity ([facility] [address] peak weight 255 lbs, bmi 37.6 from 3/12; declined emilia suppressants, med supervised wt loss, bariatric options  Pancreatitis 10/14  
 and cholecystitis Dr Rebecca Koch  Rosacea  Tobacco dependence syndrome Past Surgical History:  
Procedure Laterality Date  HX CHOLECYSTECTOMY  12/14 Dr Rebecca Koch  HX COLONOSCOPY    
 negative 2000; diverticulosis and polyps 2013 Dr Brianda Cabezas  HX HEENT  09/2017 US thyroid negative  HX ORTHOPAEDIC  2015  
 left medial meniscus tear Dr Bryson Diaz  HX UROLOGICAL    
 testicular tortion  HX VASECTOMY  VASCULAR SURGERY PROCEDURE UNLIST  9/15  
 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 of cigarette smoking Substance and Sexual Activity  Alcohol use: Yes Comment: social  
 Drug use: No  
 Sexual activity: Not on file Other Topics Concern  Not on file Social History Narrative  Not on file Current Outpatient Medications Medication Sig  
 amLODIPine (NORVASC) 5 mg tablet take 1 tablet by mouth daily  atorvastatin (LIPITOR) 10 mg tablet TAKE 1 TABLET BY MOUTH DAILY  glucose blood VI test strips (ACCU-CHEK JONATHAN PLUS TEST STRP) strip TEST TWO TIMES A DAY. DX: E11.9  Lancets (ACCU-CHEK FASTCLIX) misc Pt checks blood sugar twice daily, Dx E11.9  Lancets (ACCU-CHEK MULTICLIX LANCET) misc TEST TWO TIMES A DAY. DX:E11.9  
 ascorbic acid (VITAMIN C) 500 mg tablet Take  by mouth.  vitamin E (AQUA GEMS) 400 unit capsule Take  by mouth daily.  coenzyme q10-vitamin e (COQ10 ) 100-100 mg-unit Cap Take  by mouth. No current facility-administered medications for this visit. Allergies Allergen Reactions  Metformin (Bulk) Diarrhea  Pcn [Penicillins] Swelling REVIEW OF SYSTEMS: colo 12/13 Dr Amezquita Adryan Ophtho  no vision change or eye pain Oral  no mouth pain, tongue or tooth problems Ears  no hearing loss, ear pain, fullness, no swallowing problems Cardiac  no CP, PND, orthopnea, edema, palpitations or syncope Chest  no breast masses. Resp  no wheezing, chronic coughing, dyspnea GI  no heartburn, nausea, vomiting, change in bowel habits, bleeding, hemorrhoids Urinary  no dysuria, hematuria, flank pain, urgency, frequency Ortho  no swelling, dec ROM, myalgias Neuro  no focal weakness, numbness, paresthesias, incoordination, ataxia, involuntary movements Endo - no polyuria, polydipsia, nocturia, hot flashes There were no vitals taken for this visit. A&O x3 Affect is appropriate. Mood stable No apparent distress Anicteric, no JVD, adenopathy. No carotid bruits or radiated murmur Lungs clear to auscultation, no wheezes or rales Chest wall minimal tenderness in the right upper/medial chest area, no bruising, swelling, rash Heart showed regular rate and rhythm. No murmur, rubs, gallops Abdomen soft nontender, no hepatosplenomegaly or masses. Extremities without edema, venous varicosities noted. Pulses 1-2+ symmetrically LABS From 1/07 showed   gluc 108, cr 0.90,             alt 110,  hba1c 5.3,                   chol 212, tg 129, hdl 51, ldl-c 135, wbc 8.1, hb 17.7, plt 214, ast 67 From 3/12 showed   gluc 128, cr 0.92, gfr 95,                hba1c 5.7, ldl-p 1412,                 wbc 7.0, hb 17.0, plt 211, tsh 2.13 From 4/12 showed   gluc 95,   cr 0.89, gfr 98,  alt 57,    hba1c 5.7,                   chol 163, tg 120, hdl 47, ldl-c 92 From 6/12 showed   gluc 90,   cr 0.80, gfr 85,  alt 19,    hba1c 5.2, ldl-p 1416 From 1/13 showed   gluc 96,   cr 0.88, gfr 98,  alt 24,    hba1c 5.1, ldl-p 1421, chol 185, tg 125, hdl 49, ldl-c 111 From 10/14 showed gluc 123, cr 1.00, gfr>60, alt 59,                    wbc 9.6, hb 16.7, plt 222, ast 47, ap 69, tb 4.7, lip 7684, ck/trop neg From 3/15 showed   gluc 118, cr 0.81, gfr>60, alt 57,    hba1c 5.9, chol 184, tg 200, hdl 44, ldl-c 100, wbc 8.0, hb 17.1, plt 216, tsh 2.23 From 9/15 showed   gluc 117, cr 0.90, gfr 95,       hba1c 6.0,        chol 200, tg 176, hdl 46, ldl-c 119 From 3/16 showed           hba1c 5.7,        chol 169, tg 154, hdl 45, ldl-c 93,          tsh 2.09 From 9/16 showed   gluc 132, cr 0.77, gfr>60,       hba1c 5.8,        chol 162, tg 160, hdl 43, ldl-c 87,   wbc 8.3, hb 16.3, plt 210 From 4/17 showed   gluc 140, cr 0.80, gfr>60,       hba1c 6.5, umar 12 From 8/17 showed   gluc 145, cr 0.82, gfr>60,       hba1c 6.3,        chol 174, tg 174, hdl 44, ldl-c 95 From 11/17 showed         alt 82,                 ast 44, ap 57, tb 0.7 From 1/18 showed   gluc 185, cr 0.78, gfr>60,  alt 63,   hba1c 6.7,        chol 138, tg 174, hdl 51, ldl-c 52 From 5/18 showed   gluc 170, cr 0.79, gfr>60,  alt 77,   hba1c 7.2,        chol 138, tg 142, hdl 40, ldl-c 50,          ast 49, ap 60, tb 0.8, psa 0.2 From 10/18 showed gluc 236, cr 0.86, gfr>60,       hba1c 8.8,  umar 54 Patient Active Problem List  
Diagnosis Code  Dyslipidemia E78.5  Colon polyp Dr Alberto Steele 12/13 K63.5  Chronic venous hypertension without complications J97.069  Primary hypertension I10  Tobacco dependence syndrome F17.200  Controlled type 2 diabetes mellitus E11.9  Diverticulosis  K57.30  Severe obesity (BMI 35.0-39. 9) with comorbidity ([address] E66.01  
 Breast mass, right N63.10 Assessment and plan: 1. Hypertension. Continue current 2. Obesity. Not interested in meds, bariatric options or medically supervised intensive wt loss program.  Intermittent fasting discussed at length. Explained the concepts in detail. Went over possible physiologic changes that could occur and how that would possibly impact his situation in a positive way. Discussed 16:8 program in particular.   We also went over the differences between hunger and faith hypoglycemia and he will need to be watchful with the diabetes. He is already doing low insulin index being on diabetic diet. He will do some more research and consider implementing in the near future, standard handout given 3. Fatty liver. Wt loss 4. DM. Discussed trying the next medication but he decided to hold off as he will do his best with the IF. Recheck in 3 mos. F/u ophth 5. Dyslipidemia. Lifestyle and dietary measures 6. Smoking cessation reiterated 7. Vascular. F/U Dr April Bower 8. Persistent chest wall pain? Will sched bone scan RTC 8/18 Above conditions discussed at length and patient vocalized understanding. All questions answered to patient satisfaction

## 2018-09-25 ENCOUNTER — OFFICE VISIT (OUTPATIENT)
Dept: INTERNAL MEDICINE CLINIC | Age: 58
End: 2018-09-25

## 2018-10-22 ENCOUNTER — HOSPITAL ENCOUNTER (OUTPATIENT)
Dept: LAB | Age: 58
Discharge: HOME | End: 2018-10-22
Payer: COMMERCIAL

## 2018-10-22 DIAGNOSIS — E11.9 CONTROLLED TYPE 2 DIABETES MELLITUS WITHOUT COMPLICATION, WITHOUT LONG-TERM CURRENT USE OF INSULIN (HCC): ICD-10-CM

## 2018-10-22 LAB
ANION GAP SERPL CALC-SCNC: 7 MMOL/L (ref 3–18)
BUN SERPL-MCNC: 15 MG/DL (ref 7–18)
BUN/CREAT SERPL: 17 (ref 12–20)
CALCIUM SERPL-MCNC: 8.4 MG/DL (ref 8.5–10.1)
CHLORIDE SERPL-SCNC: 104 MMOL/L (ref 100–108)
CO2 SERPL-SCNC: 26 MMOL/L (ref 21–32)
CREAT SERPL-MCNC: 0.86 MG/DL (ref 0.6–1.3)
CREAT UR-MCNC: 334 MG/DL (ref 30–125)
GLUCOSE SERPL-MCNC: 236 MG/DL (ref 74–99)
HBA1C MFR BLD: 8.8 % (ref 4.2–5.6)
MICROALBUMIN UR-MCNC: 18.2 MG/DL (ref 0–3)
MICROALBUMIN/CREAT UR-RTO: 54 MG/G (ref 0–30)
POTASSIUM SERPL-SCNC: 4.2 MMOL/L (ref 3.5–5.5)
SODIUM SERPL-SCNC: 137 MMOL/L (ref 136–145)

## 2018-10-22 PROCEDURE — 36415 COLL VENOUS BLD VENIPUNCTURE: CPT | Performed by: INTERNAL MEDICINE

## 2018-10-22 PROCEDURE — 82043 UR ALBUMIN QUANTITATIVE: CPT | Performed by: INTERNAL MEDICINE

## 2018-10-22 PROCEDURE — 80048 BASIC METABOLIC PNL TOTAL CA: CPT | Performed by: INTERNAL MEDICINE

## 2018-10-22 PROCEDURE — 83036 HEMOGLOBIN GLYCOSYLATED A1C: CPT | Performed by: INTERNAL MEDICINE

## 2018-10-26 NOTE — PROGRESS NOTES
60 y.o. WHITE OR  male who presents for evaluation. After the last visit, he decided he wanted to try to lose weight by doing intermittent fasting but was unable to stick with it. Additionally, his 'meal choices have not been the best' and he's 'eating lost of sweets' as his wife likes to point out. He has not been checking sugars, weight is still down but no improvement in numbers below. He is open to trying the next med now Vitals 10/29/2018 8/7/2018 5/22/2018 5/7/2018 5/7/2018 4/12/2018 Weight 248 lb 258 lb 258 lb  256 lb 256 lb He saw Dr Calli Yu for opinion regarding the abn bone scan and it was her feeling this was more arthritis than anything else. His left knee has been giving him trouble and he's not been able to exercise due to that, is thinking of going back to see his orthopod in  No gi or gu complaints Past Medical History:  
Diagnosis Date  Arthritis 2018 Dr Calli Yu; right shoulder on bone scan  Diabetes ([address] 2016  
 on basis of fbs>125; intol metformin  Diverticulosis   
 and polyp 12/13 Dr Coco Mcneill  Dyslipidemia   
 calculatd 10 year risk score 13.6% (9/15); 11.1% (3/16); repeatedly declined statin  Fatty liver   
 on biopsy 2000 in ; Fib-4 was 1.25 from 3/15  
 H/O echocardiogram 08/2018  
 ef 61%, mild conc lvh, no wma, tr MR, pap 29mmHg  Heel spur   
 left  Hypertension  IFG (impaired fasting glucose)  Morbid obesity ([address] peak weight 255 lbs, bmi 37.6 from 3/12; dec emilia supp, med sup wt loss, bariatrics; IF 5/18 start weight 258 lbs but unable to comply  Pancreatitis 10/14  
 and cholecystitis Dr Pérez Harley  Rosacea  Tobacco dependence syndrome Past Surgical History:  
Procedure Laterality Date  HX CHOLECYSTECTOMY  12/14 Dr Pérez Harley  HX COLONOSCOPY    
 negative 2000; diverticulosis and polyps 2013 Dr Coco Mcneill  HX HEENT  09/2017 US thyroid negative  HX ORTHOPAEDIC  2015 left medial meniscus tear Dr Joseph Hanson  HX UROLOGICAL    
 testicular tortion  HX VASECTOMY  VASCULAR SURGERY PROCEDURE UNLIST  9/15  
 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 of cigarette smoking Substance and Sexual Activity  Alcohol use: Yes Comment: social  
 Drug use: No  
 Sexual activity: Not on file Other Topics Concern  Not on file Social History Narrative  Not on file Current Outpatient Medications Medication Sig  
 amLODIPine (NORVASC) 5 mg tablet take 1 tablet by mouth daily  atorvastatin (LIPITOR) 10 mg tablet TAKE 1 TABLET BY MOUTH DAILY  glucose blood VI test strips (ACCU-CHEK JONATHAN PLUS TEST STRP) strip TEST TWO TIMES A DAY. DX: E11.9  Lancets (ACCU-CHEK FASTCLIX) misc Pt checks blood sugar twice daily, Dx E11.9  Lancets (ACCU-CHEK MULTICLIX LANCET) misc TEST TWO TIMES A DAY. DX:E11.9  coenzyme q10-vitamin e (COQ10 ) 100-100 mg-unit Cap Take  by mouth.  ascorbic acid (VITAMIN C) 500 mg tablet Take  by mouth.  vitamin E (AQUA GEMS) 400 unit capsule Take  by mouth daily. No current facility-administered medications for this visit. Allergies Allergen Reactions  Metformin (Bulk) Diarrhea  Pcn [Penicillins] Swelling REVIEW OF SYSTEMS: colo 12/13 Dr Alveda Hashimoto Ophtho  no vision change or eye pain Oral  no mouth pain, tongue or tooth problems Ears  no hearing loss, ear pain, fullness, no swallowing problems Cardiac  no CP, PND, orthopnea, edema, palpitations or syncope Chest  no breast masses. Resp  no wheezing, chronic coughing, dyspnea GI  no heartburn, nausea, vomiting, change in bowel habits, bleeding, hemorrhoids Urinary  no dysuria, hematuria, flank pain, urgency, frequency Ortho  no swelling, dec ROM, myalgias Neuro  no focal weakness, numbness, paresthesias, incoordination, ataxia, involuntary movements Endo - no polyuria, polydipsia, nocturia, hot flashes Visit Vitals /72 Pulse 71 Temp 98.7 °F (37.1 °C) (Oral) Resp 14 Ht 5' 9\" (1.753 m) Wt 248 lb (112.5 kg) SpO2 98% BMI 36.62 kg/m² A&O x3 Affect is appropriate. Mood stable No apparent distress Anicteric, no JVD, adenopathy. No carotid bruits or radiated murmur Lungs clear to auscultation, no wheezes or rales Chest wall minimal tenderness in the right upper/medial chest area, no bruising, swelling, rash Heart showed regular rate and rhythm. No murmur, rubs, gallops Abdomen soft nontender, no hepatosplenomegaly or masses. Extremities without edema, venous varicosities noted. Pulses 1-2+ symmetrically LABS From 1/07 showed   gluc 108, cr 0.90,             alt 110,  hba1c 5.3,                   chol 212, tg 129, hdl 51, ldl-c 135, wbc 8.1, hb 17.7, plt 214, ast 67 From 3/12 showed   gluc 128, cr 0.92, gfr 95,                hba1c 5.7, ldl-p 1412,                 wbc 7.0, hb 17.0, plt 211, tsh 2.13 From 4/12 showed   gluc 95,   cr 0.89, gfr 98,  alt 57,    hba1c 5.7,                   chol 163, tg 120, hdl 47, ldl-c 92 From 6/12 showed   gluc 90,   cr 0.80, gfr 85,  alt 19,    hba1c 5.2, ldl-p 1416 From 1/13 showed   gluc 96,   cr 0.88, gfr 98,  alt 24,    hba1c 5.1, ldl-p 1421, chol 185, tg 125, hdl 49, ldl-c 111 From 10/14 showed gluc 123, cr 1.00, gfr>60, alt 59,                    wbc 9.6, hb 16.7, plt 222, ast 47, ap 69, tb 4.7, lip 7684, ck/trop neg From 3/15 showed   gluc 118, cr 0.81, gfr>60, alt 57,    hba1c 5.9,        chol 184, tg 200, hdl 44, ldl-c 100, wbc 8.0, hb 17.1, plt 216, tsh 2.23 From 9/15 showed   gluc 117, cr 0.90, gfr 95,       hba1c 6.0,        chol 200, tg 176, hdl 46, ldl-c 119 From 3/16 showed           hba1c 5.7,        chol 169, tg 154, hdl 45, ldl-c 93,          tsh 2.09 From 9/16 showed   gluc 132, cr 0.77, gfr>60,       hba1c 5.8,        chol 162, tg 160, hdl 43, ldl-c 87,   wbc 8.3, hb 16.3, plt 210 From 4/17 showed   gluc 140, cr 0.80, gfr>60,       hba1c 6.5, umar 12 From 8/17 showed   gluc 145, cr 0.82, gfr>60,       hba1c 6.3,        chol 174, tg 174, hdl 44, ldl-c 95 From 11/17 showed         alt 82,                 ast 44, ap 57, tb 0.7 From 1/18 showed   gluc 185, cr 0.78, gfr>60,  alt 63,   hba1c 6.7,        chol 138, tg 174, hdl 51, ldl-c 52 From 5/18 showed   gluc 170, cr 0.79, gfr>60,  alt 77,   hba1c 7.2,        chol 138, tg 142, hdl 40, ldl-c 50,          ast 49, ap 60, tb 0.8, psa 0.2 Results for orders placed or performed during the hospital encounter of 10/22/18 METABOLIC PANEL, BASIC Result Value Ref Range Sodium 137 136 - 145 mmol/L Potassium 4.2 3.5 - 5.5 mmol/L Chloride 104 100 - 108 mmol/L  
 CO2 26 21 - 32 mmol/L Anion gap 7 3.0 - 18 mmol/L Glucose 236 (H) 74 - 99 mg/dL BUN 15 7.0 - 18 MG/DL Creatinine 0.86 0.6 - 1.3 MG/DL  
 BUN/Creatinine ratio 17 12 - 20 GFR est AA >60 >60 ml/min/1.73m2 GFR est non-AA >60 >60 ml/min/1.73m2 Calcium 8.4 (L) 8.5 - 10.1 MG/DL MICROALBUMIN, UR, RAND W/ MICROALB/CREAT RATIO Result Value Ref Range Microalbumin,urine random 18.20 (H) 0 - 3.0 MG/DL Creatinine, urine 334.00 (H) 30 - 125 mg/dL Microalbumin/Creat ratio (mg/g creat) 54 (H) 0 - 30 mg/g HEMOGLOBIN A1C W/O EAG Result Value Ref Range Hemoglobin A1c 8.8 (H) 4.2 - 5.6 % Patient Active Problem List  
Diagnosis Code  Dyslipidemia E78.5  Colon polyp Dr Shital Romero 12/13 K63.5  Chronic venous hypertension without complications P92.816  Primary hypertension I10  Tobacco dependence syndrome F17.200  Controlled type 2 diabetes mellitus E11.9  Diverticulosis  K57.30  Severe obesity (BMI 35.0-39. 9) with comorbidity ([address] E66.01  
 Breast mass, right N63.10 Assessment and plan: 1. Hypertension. Continue current 2. Obesity. Lifestyle and dietary measures. Portion control reiterated. 3.  Fatty liver. Wt loss as above 4. DM. Discussed next meds and opted on trial of invokana. Benefits and sfx discussed at length, f/u 3 mos  F/u ophth 5. Dyslipidemia. Lifestyle and dietary measures 6. Smoking cessation reiterated 7. Vascular. F/U Dr Sally Leonardo RTC 4/19 Above conditions discussed at length and patient vocalized understanding. All questions answered to patient satisfaction

## 2018-10-29 ENCOUNTER — OFFICE VISIT (OUTPATIENT)
Dept: INTERNAL MEDICINE CLINIC | Age: 58
End: 2018-10-29

## 2018-10-29 VITALS
HEIGHT: 69 IN | OXYGEN SATURATION: 98 % | SYSTOLIC BLOOD PRESSURE: 108 MMHG | BODY MASS INDEX: 36.73 KG/M2 | HEART RATE: 71 BPM | DIASTOLIC BLOOD PRESSURE: 72 MMHG | TEMPERATURE: 98.7 F | WEIGHT: 248 LBS | RESPIRATION RATE: 14 BRPM

## 2018-10-29 DIAGNOSIS — K63.5 HYPERPLASTIC COLONIC POLYP, UNSPECIFIED PART OF COLON: ICD-10-CM

## 2018-10-29 DIAGNOSIS — I87.303 CHRONIC VENOUS HYPERTENSION INVOLVING BOTH SIDES: ICD-10-CM

## 2018-10-29 DIAGNOSIS — E66.01 SEVERE OBESITY (BMI 35.0-39.9) WITH COMORBIDITY (HCC): ICD-10-CM

## 2018-10-29 DIAGNOSIS — E78.5 DYSLIPIDEMIA: ICD-10-CM

## 2018-10-29 DIAGNOSIS — Z23 ENCOUNTER FOR IMMUNIZATION: ICD-10-CM

## 2018-10-29 DIAGNOSIS — I10 ESSENTIAL HYPERTENSION WITH GOAL BLOOD PRESSURE LESS THAN 140/90: ICD-10-CM

## 2018-10-29 NOTE — PROGRESS NOTES
Ana Paula Garcia is a 60 y.o. male who presents for routine immunizations Tdap- Left Deltoid. He denies any symptoms , reactions or allergies that would exclude them from being immunized today. Risks and adverse reactions were discussed and the VIS was given to them. All questions were addressed. He was advised to wait 10 min post injection.   
 
Chata Delaney LPN

## 2018-10-29 NOTE — PROGRESS NOTES
1. Have you been to the ER, urgent care clinic or hospitalized since your last visit? NO.  
 
2. Have you seen or consulted any other health care providers outside of the [address] [place] [address] since your last visit (Include any pap smears or colon screening)? YES  Do you have an Advanced Directive? NO Would you like information on Advanced Directives? NO Chief Complaint Patient presents with  Diabetes 4 month follow up with labs

## 2018-10-30 ENCOUNTER — TELEPHONE (OUTPATIENT)
Dept: INTERNAL MEDICINE CLINIC | Age: 58
End: 2018-10-30

## 2018-10-31 ENCOUNTER — TELEPHONE (OUTPATIENT)
Dept: INTERNAL MEDICINE CLINIC | Age: 58
End: 2018-10-31

## 2018-11-05 ENCOUNTER — TELEPHONE (OUTPATIENT)
Dept: INTERNAL MEDICINE CLINIC | Age: 58
End: 2018-11-05

## 2018-11-05 DIAGNOSIS — E11.9 CONTROLLED TYPE 2 DIABETES MELLITUS WITHOUT COMPLICATION, WITHOUT LONG-TERM CURRENT USE OF INSULIN (HCC): Primary | ICD-10-CM

## 2018-11-05 NOTE — TELEPHONE ENCOUNTER
pls call pt    Prior auth for Invokana was denied by plan. They want him to try one of the following: an alpha-glucosidase inhibitor, or a dipeptidyl peptidase 4 inhibitors, or thiazolidinedione, or a glucagon-like peptide-1 agonist     a1c was 8.8 - need to get to <7    Alpha glucosidase won't get to target, not much wt loss  dpp4 won't get to target Saira Ripyefri) - will drop may 0.5 pts  Thiazolidinedione(actos) will cause wt gain    GLP is the injectable - trulicity, victoza, etc  Is he willing to try?

## 2018-11-05 NOTE — TELEPHONE ENCOUNTER
Prior auth for Mary Her was denied by plan.   They want him to try one of the following: an alpha-glucosidase inhibitor, or a dipeptidyl peptidase 4 inhibitors, or thiazolidinedione, or a glucagon-like peptide-1 agonist

## 2018-11-06 NOTE — TELEPHONE ENCOUNTER
Advised pt of below information. Pt stated he was going to do research on glucagon-like peptide-1 agonist first before making any decisions. Pt is concerned with contraindications and side effects.

## 2018-11-06 NOTE — TELEPHONE ENCOUNTER
----- Message from [address] [place] [address] [address], Generic sent at 11/6/2018  5:37 PM EST -----       I have looked at the information available to me on line and would be willing to try the GLP-1 medication.  I am a bit concerned about thyroid and Kidney impact.  I am assuming that in my follow up bloodwork he will monitor it for any negative impacts.    Regards,  Magnus

## 2018-11-07 NOTE — TELEPHONE ENCOUNTER
Likelihood of thyroid issues is approaching zero as he has no Fhx thyroid ca and no identified nodules - thise findings were noted in mouse studies as far as I know    There is no impact on kidneys that we see, or liver  Theoretically slightly inc risk for pancreatitis as it does work in the pancreas    Start trulicity - called - have pt come in for teaching w nurse or cherie for self administration pls

## 2018-11-07 NOTE — TELEPHONE ENCOUNTER
I spoke with patient and gave him the message below. He is going to either call us or send us a message through [employer] to let us know when he gets the Trulicity in hand and we will schedule him at that time with Bibi Leung on either a Tuesday or Wednesday for patient teaching on self administration of Trulicity. I want to make sure there are no issues with him getting the Trulicity through his insurance before we put him on the schedule with Bibi eLung.

## 2018-12-10 RX ORDER — LANCETS
EACH MISCELLANEOUS
Qty: 180 EACH | Refills: 11 | Status: SHIPPED | OUTPATIENT
Start: 2018-12-10 | End: 2020-06-07 | Stop reason: SDUPTHER

## 2019-01-02 ENCOUNTER — TELEPHONE (OUTPATIENT)
Dept: INTERNAL MEDICINE CLINIC | Age: 59
End: 2019-01-02

## 2019-01-02 NOTE — TELEPHONE ENCOUNTER
Aminata Pena is calling from [facility] requesting recent note so they can order diabetic shoes and inserts.     Fax: [phone]   Attn: Aminata Pena

## 2019-01-03 ENCOUNTER — TELEPHONE (OUTPATIENT)
Dept: INTERNAL MEDICINE CLINIC | Age: 59
End: 2019-01-03

## 2019-01-03 NOTE — TELEPHONE ENCOUNTER
Regarding: Non-Urgent Medical Question  Contact: [phone]  ----- Message from [address] [place] [address] [address], Generic sent at 1/3/2019  6:30 AM EST -----    Dr. Shayy Mchugh,      I went to Dr. Reji Beebe for my Diabetic Foot Exam,  She said everything was fine and scheduled me for another  exam in the fall. Please ask the staff to update my records accordingly.   Dr. Reji Beebe will be providing your office with a copy of her exam.    Thank you ,   Nick Chase

## 2019-01-21 ENCOUNTER — HOSPITAL ENCOUNTER (OUTPATIENT)
Dept: LAB | Age: 59
Discharge: HOME | End: 2019-01-21
Payer: COMMERCIAL

## 2019-01-21 DIAGNOSIS — E78.5 DYSLIPIDEMIA: ICD-10-CM

## 2019-01-21 LAB
ALBUMIN SERPL-MCNC: 4.1 G/DL (ref 3.4–5)
ALBUMIN/GLOB SERPL: 1.3 {RATIO} (ref 0.8–1.7)
ALP SERPL-CCNC: 53 U/L (ref 45–117)
ALT SERPL-CCNC: 57 U/L (ref 16–61)
ANION GAP SERPL CALC-SCNC: 8 MMOL/L (ref 3–18)
AST SERPL-CCNC: 32 U/L (ref 15–37)
BILIRUB SERPL-MCNC: 0.8 MG/DL (ref 0.2–1)
BUN SERPL-MCNC: 18 MG/DL (ref 7–18)
BUN/CREAT SERPL: 20 (ref 12–20)
CALCIUM SERPL-MCNC: 8.5 MG/DL (ref 8.5–10.1)
CHLORIDE SERPL-SCNC: 103 MMOL/L (ref 100–108)
CHOLEST SERPL-MCNC: 117 MG/DL
CO2 SERPL-SCNC: 26 MMOL/L (ref 21–32)
CREAT SERPL-MCNC: 0.9 MG/DL (ref 0.6–1.3)
CREAT UR-MCNC: 242 MG/DL (ref 30–125)
GLOBULIN SER CALC-MCNC: 3.2 G/DL (ref 2–4)
GLUCOSE SERPL-MCNC: 109 MG/DL (ref 74–99)
HBA1C MFR BLD: 5.8 % (ref 4.2–5.6)
HDLC SERPL-MCNC: 40 MG/DL (ref 40–60)
HDLC SERPL: 2.9 {RATIO} (ref 0–5)
LDLC SERPL CALC-MCNC: 41.2 MG/DL (ref 0–100)
LIPID PROFILE,FLP: ABNORMAL
MICROALBUMIN UR-MCNC: 4.34 MG/DL (ref 0–3)
MICROALBUMIN/CREAT UR-RTO: 18 MG/G (ref 0–30)
POTASSIUM SERPL-SCNC: 4.6 MMOL/L (ref 3.5–5.5)
PROT SERPL-MCNC: 7.3 G/DL (ref 6.4–8.2)
SODIUM SERPL-SCNC: 137 MMOL/L (ref 136–145)
TRIGL SERPL-MCNC: 179 MG/DL (ref ?–150)
VLDLC SERPL CALC-MCNC: 35.8 MG/DL

## 2019-01-21 PROCEDURE — 80061 LIPID PANEL: CPT

## 2019-01-21 PROCEDURE — 82043 UR ALBUMIN QUANTITATIVE: CPT

## 2019-01-21 PROCEDURE — 80053 COMPREHEN METABOLIC PANEL: CPT

## 2019-01-21 PROCEDURE — 83036 HEMOGLOBIN GLYCOSYLATED A1C: CPT

## 2019-01-21 PROCEDURE — 36415 COLL VENOUS BLD VENIPUNCTURE: CPT

## 2019-01-25 NOTE — PROGRESS NOTES
60 y.o. WHITE OR  male who presents for evaluation. At the last visit, we decided to add SGLT2 but this was not covered by insurance so he was started on GLP instead. Doing well on Trulicity, also doing much better with avoiding carbohydrates and free sugars. Weight is down substantially as noted below. Vitals 1/28/2019 10/29/2018 8/7/2018 5/22/2018   Weight 233 lb 248 lb 258 lb 258 lb     He continues to see Dr. Kojo Davies for the left knee issues. Thinking of getting another cortisone shot. No gi or gu complaints    Past Medical History:   Diagnosis Date    Arthritis 2018    Dr Starla Cohen; right shoulder on bone scan    Diabetes [address] 2016    on basis of fbs>125; intol metformin    Diverticulosis     and polyp 12/13 Dr Andrzej Das    Dyslipidemia     calculatd 10 year risk score 13.6% (9/15); 11.1% (3/16); repeatedly declined statin    Fatty liver     on biopsy 2000 in [place];  Fib-4 was 1.25 from 3/15    H/O echocardiogram 08/2018    ef 61%, mild conc lvh, no wma, tr MR, pap 29mmHg    Heel spur     left    Hypertension     IFG (impaired fasting glucose)     Morbid obesity (HCC)     peak weight 255 lbs, bmi 37.6 from 3/12; dec emilia supp, med sup wt loss, bariatrics; IF 5/18 start weight 258 lbs but unable to comply    Pancreatitis 10/14    and cholecystitis Dr Breann Betancourt Tobacco dependence syndrome      Past Surgical History:   Procedure Laterality Date    HX CHOLECYSTECTOMY  12/14    Dr Les Tapia HX COLONOSCOPY      negative 2000; diverticulosis and polyps 2013 Dr Andrzej Das    HX HEENT  09/2017    US thyroid negative    HX ORTHOPAEDIC  2015    left medial meniscus tear Dr Janki Mccauley HX UROLOGICAL      testicular tortion    HX VASECTOMY      VASCULAR SURGERY PROCEDURE UNLIST  9/15    left GSV and EVLT Dr Faiza Azar History     Socioeconomic History    Marital status:      Spouse name: Not on file    Number of children: 1    Years of education: Not on file   Joe Espana Highest education level: Not on file   Social Needs    Financial resource strain: Not on file    Food insecurity - worry: Not on file    Food insecurity - inability: Not on file    Transportation needs - medical: Not on file   [employer] needs - non-medical: Not on file   Occupational History    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 of cigarette smoking   Substance and Sexual Activity    Alcohol use: Yes     Comment: social    Drug use: No    Sexual activity: Not on file   Other Topics Concern    Not on file   Social History Narrative    Not on file     Current Outpatient Medications   Medication Sig    glucose blood VI test strips (ACCU-CHEK JONATHAN PLUS TEST STRP) strip TEST TWO TIMES A DAY. DX: E11.9    lancets (ACCU-CHEK FASTCLIX LANCING DEV) misc Pt checks blood sugar twice daily, Dx E11.9    dulaglutide (TRULICITY) 1.5 XZ/7.7 mL sub-q pen 0.5 mL by SubCUTAneous route every seven (7) days.  canagliflozin (INVOKANA) 300 mg tablet Take 1 Tab by mouth daily.  amLODIPine (NORVASC) 5 mg tablet take 1 tablet by mouth daily    atorvastatin (LIPITOR) 10 mg tablet TAKE 1 TABLET BY MOUTH DAILY    Lancets (ACCU-CHEK MULTICLIX LANCET) misc TEST TWO TIMES A DAY. DX:E11.9    ascorbic acid (VITAMIN C) 500 mg tablet Take  by mouth.  vitamin E (AQUA GEMS) 400 unit capsule Take  by mouth daily.  coenzyme q10-vitamin e (COQ10 ) 100-100 mg-unit Cap Take  by mouth. No current facility-administered medications for this visit.       Allergies   Allergen Reactions    Metformin (Bulk) Diarrhea    Pcn [Penicillins] Swelling     REVIEW OF SYSTEMS: colo 12/13 Dr Andrzej Das  Ophtho - no vision change or eye pain  Oral - no mouth pain, tongue or tooth problems  Ears - no hearing loss, ear pain, fullness, no swallowing problems  Cardiac - no CP, PND, orthopnea, edema, palpitations or syncope  Chest - no breast masses. Resp - no wheezing, chronic coughing, dyspnea  GI - no heartburn, nausea, vomiting, change in bowel habits, bleeding, hemorrhoids  Urinary - no dysuria, hematuria, flank pain, urgency, frequency  Ortho - no swelling, dec ROM, myalgias  Neuro - no focal weakness, numbness, paresthesias, incoordination, ataxia, involuntary movements  Endo - no polyuria, polydipsia, nocturia, hot flashes    There were no vitals taken for this visit. A&O x3  Affect is appropriate. Mood stable  No apparent distress  Anicteric, no JVD, adenopathy. No carotid bruits or radiated murmur  Lungs clear to auscultation, no wheezes or rales  Chest wall minimal tenderness in the right upper/medial chest area, no bruising, swelling, rash  Heart showed regular rate and rhythm. No murmur, rubs, gallops  Abdomen soft nontender, no hepatosplenomegaly or masses. Extremities without edema, venous varicosities noted but no tenderness/warmth/erythema in the right post calf in particular.   Pulses 1-2+ symmetrically    LABS    From 1/07 showed   gluc 108, cr 0.90,             alt 110,  hba1c 5.3,                   chol 212, tg 129, hdl 51, ldl-c 135, wbc 8.1, hb 17.7, plt 214, ast 67  From 3/12 showed   gluc 128, cr 0.92, gfr 95,                hba1c 5.7, ldl-p 1412,                 wbc 7.0, hb 17.0, plt 211, tsh 2.13  From 4/12 showed   gluc 95,   cr 0.89, gfr 98,  alt 57,    hba1c 5.7,                   chol 163, tg 120, hdl 47, ldl-c 92  From 6/12 showed   gluc 90,   cr 0.80, gfr 85,  alt 19,    hba1c 5.2, ldl-p 1416  From 1/13 showed   gluc 96,   cr 0.88, gfr 98,  alt 24,    hba1c 5.1, ldl-p 1421, chol 185, tg 125, hdl 49, ldl-c 111   From 10/14 showed gluc 123, cr 1.00, gfr>60, alt 59,                    wbc 9.6, hb 16.7, plt 222, ast 47, ap 69, tb 4.7, lip 7684, ck/trop neg  From 3/15 showed   gluc 118, cr 0.81, gfr>60, alt 57,    hba1c 5.9, chol 184, tg 200, hdl 44, ldl-c 100, wbc 8.0, hb 17.1, plt 216, tsh 2.23  From 9/15 showed   gluc 117, cr 0.90, gfr 95,       hba1c 6.0,        chol 200, tg 176, hdl 46, ldl-c 119  From 3/16 showed           hba1c 5.7,        chol 169, tg 154, hdl 45, ldl-c 93,          tsh 2.09  From 9/16 showed   gluc 132, cr 0.77, gfr>60,       hba1c 5.8,        chol 162, tg 160, hdl 43, ldl-c 87,   wbc 8.3, hb 16.3, plt 210  From 4/17 showed   gluc 140, cr 0.80, gfr>60,       hba1c 6.5, umar 12                From 8/17 showed   gluc 145, cr 0.82, gfr>60,       hba1c 6.3,        chol 174, tg 174, hdl 44, ldl-c 95  From 11/17 showed         alt 82,                 ast 44, ap 57, tb 0.7  From 1/18 showed   gluc 185, cr 0.78, gfr>60,  alt 63,   hba1c 6.7,        chol 138, tg 174, hdl 51, ldl-c 52  From 5/18 showed   gluc 170, cr 0.79, gfr>60,  alt 77,   hba1c 7.2,        chol 138, tg 142, hdl 40, ldl-c 50,          ast 49, ap 60, tb 0.8, psa 0.2  From 10/18 showed gluc 236, cr 0.86, gfr>60,       hba1c 8.8,  umar 54    Results for orders placed or performed during the hospital encounter of 28/17/40   METABOLIC PANEL, COMPREHENSIVE   Result Value Ref Range    Sodium 137 136 - 145 mmol/L    Potassium 4.6 3.5 - 5.5 mmol/L    Chloride 103 100 - 108 mmol/L    CO2 26 21 - 32 mmol/L    Anion gap 8 3.0 - 18 mmol/L    Glucose 109 (H) 74 - 99 mg/dL    BUN 18 7.0 - 18 MG/DL    Creatinine 0.90 0.6 - 1.3 MG/DL    BUN/Creatinine ratio 20 12 - 20      GFR est AA >60 >60 ml/min/1.73m2    GFR est non-AA >60 >60 ml/min/1.73m2    Calcium 8.5 8.5 - 10.1 MG/DL    Bilirubin, total 0.8 0.2 - 1.0 MG/DL    ALT (SGPT) 57 16 - 61 U/L    AST (SGOT) 32 15 - 37 U/L    Alk.  phosphatase 53 45 - 117 U/L    Protein, total 7.3 6.4 - 8.2 g/dL    Albumin 4.1 3.4 - 5.0 g/dL    Globulin 3.2 2.0 - 4.0 g/dL    A-G Ratio 1.3 0.8 - 1.7     MICROALBUMIN, UR, RAND W/ MICROALB/CREAT RATIO   Result Value Ref Range    Microalbumin,urine random 4.34 (H) 0 - 3.0 MG/DL Creatinine, urine 242.00 (H) 30 - 125 mg/dL    Microalbumin/Creat ratio (mg/g creat) 18 0 - 30 mg/g   HEMOGLOBIN A1C W/O EAG   Result Value Ref Range    Hemoglobin A1c 5.8 (H) 4.2 - 5.6 %   LIPID PANEL   Result Value Ref Range    LIPID PROFILE          Cholesterol, total 117 <200 MG/DL    Triglyceride 179 (H) <150 MG/DL    HDL Cholesterol 40 40 - 60 MG/DL    LDL, calculated 41.2 0 - 100 MG/DL    VLDL, calculated 35.8 MG/DL    CHOL/HDL Ratio 2.9 0 - 5.0       Patient Active Problem List   Diagnosis Code    Dyslipidemia E78.5    Colon polyp Dr Jason Azevedo 12/13 K63.5    Chronic venous hypertension without complications L30.760    Primary hypertension I10    Tobacco dependence syndrome F17.200    Controlled type 2 diabetes mellitus E11.9    Diverticulosis  K57.30    Severe obesity (BMI 35.0-39. 9) with comorbidity ([address] E66.01    Breast mass, right N63.10    Uncontrolled type 2 diabetes mellitus with microalbuminuria (HCC) E11.29, E11.65, R80.9     Assessment and plan:  1. Hypertension. Continue current   2. Obesity. Lifestyle and dietary measures. Portion control reiterated. Congratulated him on his success thus far. He is thinking of retrying IF in the future after he retires  3. Fatty liver. Wt loss as above  4. DM. Remarkable improvement in his numbers. Continue attempts at weight loss, no hypoglycemia thus far. Continue Trulicity f/u ophth  5. Dyslipidemia. Lifestyle and dietary measures  6. Smoking cessation reiterated  7. Vascular. F/U Dr Colletta Pander        RTC 7/19    Above conditions discussed at length and patient vocalized understanding.   All questions answered to patient satisfaction

## 2019-01-28 ENCOUNTER — OFFICE VISIT (OUTPATIENT)
Dept: INTERNAL MEDICINE CLINIC | Age: 59
End: 2019-01-28

## 2019-01-28 VITALS
RESPIRATION RATE: 14 BRPM | HEIGHT: 69 IN | TEMPERATURE: 98.6 F | WEIGHT: 233 LBS | SYSTOLIC BLOOD PRESSURE: 128 MMHG | BODY MASS INDEX: 34.51 KG/M2 | DIASTOLIC BLOOD PRESSURE: 72 MMHG | OXYGEN SATURATION: 98 % | HEART RATE: 66 BPM

## 2019-01-28 DIAGNOSIS — I87.303 CHRONIC VENOUS HYPERTENSION INVOLVING BOTH SIDES: ICD-10-CM

## 2019-01-28 DIAGNOSIS — I10 ESSENTIAL HYPERTENSION WITH GOAL BLOOD PRESSURE LESS THAN 140/90: ICD-10-CM

## 2019-01-28 DIAGNOSIS — E78.5 DYSLIPIDEMIA: ICD-10-CM

## 2019-01-28 DIAGNOSIS — E66.01 SEVERE OBESITY (BMI 35.0-39.9) WITH COMORBIDITY (HCC): ICD-10-CM

## 2019-01-28 DIAGNOSIS — K63.5 HYPERPLASTIC COLONIC POLYP, UNSPECIFIED PART OF COLON: ICD-10-CM

## 2019-01-28 PROBLEM — E11.9 CONTROLLED TYPE 2 DIABETES MELLITUS WITHOUT COMPLICATION, WITHOUT LONG-TERM CURRENT USE OF INSULIN (HCC): Status: RESOLVED | Noted: 2017-04-20 | Resolved: 2019-01-28

## 2019-01-28 NOTE — PROGRESS NOTES
1. Have you been to the ER, urgent care clinic or hospitalized since your last visit? NO.     2. Have you seen or consulted any other health care providers outside of the [address] [place] [address] since your last visit (Include any pap smears or colon screening)? NO      Do you have an Advanced Directive? NO    Would you like information on Advanced Directives?  NO    Chief Complaint   Patient presents with    Diabetes     3 month follow up with labs

## 2019-04-14 DIAGNOSIS — E11.9 CONTROLLED TYPE 2 DIABETES MELLITUS WITHOUT COMPLICATION, WITHOUT LONG-TERM CURRENT USE OF INSULIN (HCC): ICD-10-CM

## 2019-04-15 RX ORDER — DULAGLUTIDE 1.5 MG/.5ML
INJECTION, SOLUTION SUBCUTANEOUS
Qty: 4 PEN | Refills: 11 | Status: SHIPPED | OUTPATIENT
Start: 2019-04-15 | End: 2020-05-11

## 2019-04-22 ENCOUNTER — TELEPHONE (OUTPATIENT)
Dept: INTERNAL MEDICINE CLINIC | Age: 59
End: 2019-04-22

## 2019-04-22 DIAGNOSIS — G89.29 CHRONIC KNEE PAIN, UNSPECIFIED LATERALITY: Primary | ICD-10-CM

## 2019-04-22 DIAGNOSIS — M25.569 CHRONIC KNEE PAIN, UNSPECIFIED LATERALITY: Primary | ICD-10-CM

## 2019-04-22 NOTE — TELEPHONE ENCOUNTER
Regarding: Referral Request  Contact: [phone]  ----- Message from [address] [place] [address] [address], Generic sent at 4/20/2019  9:19 AM EDT -----    I made an appointment with Dr. Kathryn Waddell on 26 April due to the continued pain in my left knee. As you may remember, I had previously had some treatment due to the torn meniscus. Since it has been years since my initial visit to Dr. Leroy Cotto I thought maybe I would need a more recent referrel for insurance purposes.

## 2019-07-03 ENCOUNTER — HOSPITAL ENCOUNTER (OUTPATIENT)
Dept: LAB | Age: 59
Discharge: HOME | End: 2019-07-03
Payer: COMMERCIAL

## 2019-07-03 LAB
CREAT UR-MCNC: 195 MG/DL (ref 30–125)
HBA1C MFR BLD: 5.5 % (ref 4.2–5.6)
MICROALBUMIN UR-MCNC: 3.57 MG/DL (ref 0–3)
MICROALBUMIN/CREAT UR-RTO: 18 MG/G (ref 0–30)
PSA SERPL-MCNC: 0.4 NG/ML (ref 0–4)

## 2019-07-03 PROCEDURE — 36415 COLL VENOUS BLD VENIPUNCTURE: CPT

## 2019-07-03 PROCEDURE — 82043 UR ALBUMIN QUANTITATIVE: CPT

## 2019-07-03 PROCEDURE — 83036 HEMOGLOBIN GLYCOSYLATED A1C: CPT

## 2019-07-03 PROCEDURE — 84153 ASSAY OF PSA TOTAL: CPT

## 2019-07-04 NOTE — PROGRESS NOTES
60 y.o. WHITE OR  male who presents for evaluation. Denies polyuria, polydipsia, nocturia, vision change Sugars are in good range when he checks. The weight loss has plateaued as below. Vitals 7/9/2019 1/28/2019 10/29/2018 8/7/2018 5/22/2018   Weight 235 lb 233 lb 248 lb 258 lb 258 lb     He continues to see Dr. Laurita White for the left knee and TKR is planned sometime at the beginning of next year    Not able to exercise due to above. No cp, sob, pnd, edema, palpitations or syncope. He does report a vague non exertional throat tightness that's a minor concern to him. Sometimes lasts for hours, not worsened or triggered by anything. Specifically no gerd or heartburn, difficulty swallowing    No other gi or gu complaints    Past Medical History:   Diagnosis Date    Arthritis 2018    Dr Akin Mejia; right shoulder on bone scan    Diabetes ([address] 2016    on basis of fbs>125; intol metformin, sglt2 not covered    Diverticulosis     and polyp 12/13 Dr Hunter Todd    Dyslipidemia     calculatd 10 year risk score 13.6% (9/15); 11.1% (3/16)    Fatty liver     on biopsy 2000 in [place];  Fib-4 was 1.25 from 3/15    H/O echocardiogram 08/2018    ef 61%, mild conc lvh, no wma, tr MR, pap 29mmHg    Heel spur     left    Hypertension     IFG (impaired fasting glucose)     Morbid obesity (HCC)     peak weight 255 lbs, bmi 37.6 from 3/12; dec emilia supp, med sup wt loss, bariatrics; IF 5/18 start weight 258 lbs but unable to comply    Pancreatitis 10/14    and cholecystitis Dr Kellie Garduno Tobacco dependence syndrome      Past Surgical History:   Procedure Laterality Date    HX CHOLECYSTECTOMY  12/14    Dr Rosilyn Osler HX COLONOSCOPY      negative 2000; diverticulosis and polyps 2013 Dr Hunter Todd    HX HEENT  09/2017    US thyroid negative    HX ORTHOPAEDIC  2015    left medial meniscus tear Dr Kamla Terrell      testicular tortion    HX VASECTOMY      VASCULAR SURGERY PROCEDURE UNLIST  9/15 left GSV and EVLT Dr David Soto History     Socioeconomic History    Marital status:      Spouse name: Not on file    Number of children: 1    Years of education: Not on file    Highest education level: Not on file   Occupational History    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 of cigarette smoking   Substance and Sexual Activity    Alcohol use: Yes     Comment: social    Drug use: No    Sexual activity: Not on file   Lifestyle    Physical activity:     Days per week: Not on file     Minutes per session: Not on file    Stress: Not on file   Relationships    Social connections:     Talks on phone: Not on file     Gets together: Not on file     Attends [employer] service: Not on file     Active member of club or organization: Not on file     Attends meetings of clubs or organizations: Not on file     Relationship status: Not on file    Intimate partner violence:     Fear of current or ex partner: Not on file     Emotionally abused: Not on file     Physically abused: Not on file     Forced sexual activity: Not on file   Other Topics Concern    Not on file   Social History Narrative    Not on file     Current Outpatient Medications   Medication Sig    melatonin 10 mg tab Take 10 mg by mouth daily.     amLODIPine (NORVASC) 5 mg tablet TAKE 1 TABLET BY MOUTH DAILY    atorvastatin (LIPITOR) 10 mg tablet TAKE 1 TABLET BY MOUTH ONCE A DAY    TRULICITY 1.5 [place]/9.4 mL sub-q pen INJECT 1.5 MG (0.5) UNDER THE SKIN ONCE WEEKLY    glucose blood VI test strips (ACCU-CHEK JONATHAN PLUS TEST STRP) strip TEST TWO TIMES A DAY. DX: E11.9    lancets (ACCU-CHEK FASTCLIX LANCING DEV) misc Pt checks blood sugar twice daily, Dx E11.9    Lancets (ACCU-CHEK MULTICLIX LANCET) misc TEST TWO TIMES A DAY. DX:E11.9    coenzyme q10-vitamin e (COQ10 ) 100-100 mg-unit Cap Take  by mouth. No current facility-administered medications for this visit. Allergies   Allergen Reactions    Metformin (Bulk) Diarrhea    Pcn [Penicillins] Swelling     REVIEW OF SYSTEMS: colo 12/13 Dr Cammie Mcconnell  Ophtho  no vision change or eye pain  Oral  no mouth pain, tongue or tooth problems  Ears  no hearing loss, ear pain, fullness, no swallowing problems  Cardiac  no CP, PND, orthopnea, edema, palpitations or syncope  Chest  no breast masses. Resp  no wheezing, chronic coughing, dyspnea  GI  no heartburn, nausea, vomiting, change in bowel habits, bleeding, hemorrhoids  Urinary  no dysuria, hematuria, flank pain, urgency, frequency    Visit Vitals  /82   Pulse 66   Temp 98.4 °F (36.9 °C) (Oral)   Resp 14   Ht 5' 9\" (1.753 m)   Wt 235 lb (106.6 kg)   SpO2 95%   BMI 34.70 kg/m²   A&O x3  Affect is appropriate. Mood stable  No apparent distress  Anicteric, no JVD, adenopathy. No carotid bruits or radiated murmur  Lungs clear to auscultation, no wheezes or rales  Heart showed regular rate and rhythm. No murmur, rubs, gallops  Abdomen soft nontender, no hepatosplenomegaly or masses.    Extremities without edema, venous varicosities noted    LABS    From 1/07 showed   gluc 108, cr 0.90,             alt 110,  hba1c 5.3,                   chol 212, tg 129, hdl 51, ldl-c 135, wbc 8.1, hb 17.7, plt 214, ast 67  From 3/12 showed   gluc 128, cr 0.92, gfr 95,                hba1c 5.7, ldl-p 1412,                 wbc 7.0, hb 17.0, plt 211, tsh 2.13  From 4/12 showed   gluc 95,   cr 0.89, gfr 98,  alt 57,    hba1c 5.7,                   chol 163, tg 120, hdl 47, ldl-c 92  From 6/12 showed   gluc 90,   cr 0.80, gfr 85,  alt 19,    hba1c 5.2, ldl-p 1416  From 1/13 showed   gluc 96,   cr 0.88, gfr 98,  alt 24,    hba1c 5.1, ldl-p 1421, chol 185, tg 125, hdl 49, ldl-c 111   From 10/14 showed gluc 123, cr 1.00, gfr>60, alt 59,                    wbc 9.6, hb 16.7, plt 222, ast 47, ap 69, tb 4.7, lip 7684, ck/trop neg  From 3/15 showed   gluc 118, cr 0.81, gfr>60, alt 57,    hba1c 5.9,        chol 184, tg 200, hdl 44, ldl-c 100, wbc 8.0, hb 17.1, plt 216, tsh 2.23  From 9/15 showed   gluc 117, cr 0.90, gfr 95,       hba1c 6.0,        chol 200, tg 176, hdl 46, ldl-c 119  From 3/16 showed           hba1c 5.7,        chol 169, tg 154, hdl 45, ldl-c 93,          tsh 2.09  From 9/16 showed   gluc 132, cr 0.77, gfr>60,       hba1c 5.8,        chol 162, tg 160, hdl 43, ldl-c 87,   wbc 8.3, hb 16.3, plt 210  From 4/17 showed   gluc 140, cr 0.80, gfr>60,       hba1c 6.5, umar 12                From 8/17 showed   gluc 145, cr 0.82, gfr>60,       hba1c 6.3,        chol 174, tg 174, hdl 44, ldl-c 95  From 11/17 showed         alt 82,                 ast 44, ap 57, tb 0.7  From 1/18 showed   gluc 185, cr 0.78, gfr>60,  alt 63,   hba1c 6.7,        chol 138, tg 174, hdl 51, ldl-c 52  From 5/18 showed   gluc 170, cr 0.79, gfr>60,  alt 77,   hba1c 7.2,        chol 138, tg 142, hdl 40, ldl-c 50,          ast 49, ap 60, tb 0.8, psa 0.2  From 10/18 showed gluc 236, cr 0.86, gfr>60,       hba1c 8.8,  umar 54  From 1/19 showed   gluc 109, cr 0.90, gfr>60,  alt 57,   hba1c 5.8,        chol 117, tg 179, hdl 40, ldl-c 41,          umar 18    Results for orders placed or performed during the hospital encounter of 07/03/19   MICROALBUMIN, UR, RAND W/ MICROALB/CREAT RATIO   Result Value Ref Range    Microalbumin,urine random 3.57 (H) 0 - 3.0 MG/DL    Creatinine, urine 195.00 (H) 30 - 125 mg/dL    Microalbumin/Creat ratio (mg/g creat) 18 0 - 30 mg/g   HEMOGLOBIN A1C W/O EAG   Result Value Ref Range    Hemoglobin A1c 5.5 4.2 - 5.6 %   PSA, DIAGNOSTIC (PROSTATE SPECIFIC AG)   Result Value Ref Range    Prostate Specific Ag 0.4 0.0 - 4.0 ng/mL     EKG as read by me showed nsr, rate 62, nl int, nothing acute    Patient Active Problem List   Diagnosis Code    Dyslipidemia E78.5    Colon polyp Dr Ramakrishna Pal 12/13 K63.5    Chronic venous hypertension without complications Z66.278    Primary hypertension I10    Tobacco dependence syndrome F17.200    Diverticulosis  K57.30    Severe obesity (BMI 35.0-39. 9) with comorbidity ([address] E66.01    Breast mass, right N63.10    Uncontrolled type 2 diabetes mellitus with microalbuminuria (HCC) E11.29, E11.65, R80.9     Assessment and plan:  1. Hypertension. Continue current   2. Obesity. Lifestyle and dietary measures. Portion control reiterated. Congratulated him on his success thus far. 3.  Fatty liver. Wt loss as above  4. DM. Remarkable improvement in his numbers. Continue attempts at weight loss, no hypoglycemia thus far. Continue Trulicity f/u ophth  5. Dyslipidemia. Lifestyle and dietary measures  6. Smoking cessation reiterated  7. Vascular. F/U Dr Joy Spann  8. Throat sx. Long discussion about getting stress testing, he deferred for now and will call back if he has progressive sx or if he changes his mind        RTC 1/20    Above conditions discussed at length and patient vocalized understanding.   All questions answered to patient satisfaction

## 2019-07-09 ENCOUNTER — OFFICE VISIT (OUTPATIENT)
Dept: INTERNAL MEDICINE CLINIC | Age: 59
End: 2019-07-09

## 2019-07-09 VITALS
HEART RATE: 66 BPM | TEMPERATURE: 98.4 F | DIASTOLIC BLOOD PRESSURE: 82 MMHG | RESPIRATION RATE: 14 BRPM | WEIGHT: 235 LBS | SYSTOLIC BLOOD PRESSURE: 136 MMHG | OXYGEN SATURATION: 95 % | HEIGHT: 69 IN | BODY MASS INDEX: 34.8 KG/M2

## 2019-07-09 DIAGNOSIS — R80.9 DIABETES MELLITUS DUE TO UNDERLYING CONDITION, CONTROLLED, WITH MICROALBUMINURIA, WITHOUT LONG-TERM CURRENT USE OF INSULIN (HCC): Primary | ICD-10-CM

## 2019-07-09 DIAGNOSIS — E08.29 DIABETES MELLITUS DUE TO UNDERLYING CONDITION, CONTROLLED, WITH MICROALBUMINURIA, WITHOUT LONG-TERM CURRENT USE OF INSULIN (HCC): Primary | ICD-10-CM

## 2019-07-09 DIAGNOSIS — E66.01 SEVERE OBESITY (BMI 35.0-39.9) WITH COMORBIDITY (HCC): ICD-10-CM

## 2019-07-09 DIAGNOSIS — R07.0 THROAT PAIN: ICD-10-CM

## 2019-07-09 DIAGNOSIS — K63.5 HYPERPLASTIC COLONIC POLYP, UNSPECIFIED PART OF COLON: ICD-10-CM

## 2019-07-09 DIAGNOSIS — I10 ESSENTIAL HYPERTENSION WITH GOAL BLOOD PRESSURE LESS THAN 140/90: ICD-10-CM

## 2019-07-09 DIAGNOSIS — E78.5 DYSLIPIDEMIA: ICD-10-CM

## 2019-07-09 DIAGNOSIS — F17.200 TOBACCO DEPENDENCE SYNDROME: ICD-10-CM

## 2019-07-09 PROBLEM — N63.10 BREAST MASS, RIGHT: Status: RESOLVED | Noted: 2018-05-07 | Resolved: 2019-07-09

## 2019-07-09 RX ORDER — ACETAMINOPHEN, DIPHENHYDRAMINE HCL, PHENYLEPHRINE HCL 325; 25; 5 MG/1; MG/1; MG/1
10 TABLET ORAL DAILY
COMMUNITY
End: 2021-07-02

## 2019-07-09 NOTE — PROGRESS NOTES
Ashley Boyce presents today for   Chief Complaint   Patient presents with    Diabetes     follow up with labs              Depression Screening:  3 most recent PHQ Screens 7/9/2019   Little interest or pleasure in doing things Not at all   Feeling down, depressed, irritable, or hopeless Not at all   Total Score PHQ 2 0       Learning Assessment:  Learning Assessment 7/9/2019   PRIMARY LEARNER Patient   HIGHEST LEVEL OF EDUCATION - PRIMARY LEARNER  SOME COLLEGE   BARRIERS PRIMARY LEARNER NONE   CO-LEARNER CAREGIVER No   PRIMARY LANGUAGE ENGLISH   LEARNER PREFERENCE PRIMARY VIDEOS     -   ANSWERED BY [employer]   RELATIONSHIP SELF       Abuse Screening:  Abuse Screening Questionnaire 7/9/2019   Do you ever feel afraid of your partner? N   Are you in a relationship with someone who physically or mentally threatens you? N   Is it safe for you to go home? Y       Fall Risk  No flowsheet data found. Coordination of Care:  1. Have you been to the ER, urgent care clinic since your last visit? Hospitalized since your last visit? no    2. Have you seen or consulted any other health care providers outside of the [address] [place] [address] since your last visit? Include any pap smears or colon screening.  no

## 2019-09-11 ENCOUNTER — TELEPHONE (OUTPATIENT)
Dept: INTERNAL MEDICINE CLINIC | Age: 59
End: 2019-09-11

## 2019-09-11 ENCOUNTER — PATIENT MESSAGE (OUTPATIENT)
Dept: INTERNAL MEDICINE CLINIC | Age: 59
End: 2019-09-11

## 2019-09-11 NOTE — TELEPHONE ENCOUNTER
----- Message from Anny Hansen sent at 9/11/2019  9:23 AM EDT -----  Regarding: Non-Urgent Medical Question  Contact: [phone]  I have scheduled Left knee replacement surgery with Dr. Carissa Amador) for the 21st of Jan 2020. I will need clearance from my primary care (Dr. Mirella Contreras) within 30 Days of the surgery. I already have a normal  appointment scheduled for 08 Jan with Dr. Mirella Contreras. Can everything be handled at that appointment or do I need to schedule something else. Thanking you in advance.       Natividad Barreto

## 2019-10-15 ENCOUNTER — PATIENT MESSAGE (OUTPATIENT)
Dept: INTERNAL MEDICINE CLINIC | Age: 59
End: 2019-10-15

## 2019-10-15 ENCOUNTER — TELEPHONE (OUTPATIENT)
Dept: INTERNAL MEDICINE CLINIC | Age: 59
End: 2019-10-15

## 2019-10-15 NOTE — TELEPHONE ENCOUNTER
----- Message from Lizett Spine sent at 10/15/2019  6:51 AM EDT -----  Regarding: Non-Urgent Medical Question  Contact: [phone]  As a follow up to my question concerning Primary Care Approval for my upcoming Knee Replacement (Jan). Will Dr. Sanjeev Blas require that I visit Dr. Brooks Matthews (Previously performed vein procedure on same leg) for an evaluation    ? Thanking you in advance.    Kim Eisenmenger

## 2019-10-17 ENCOUNTER — PATIENT MESSAGE (OUTPATIENT)
Dept: INTERNAL MEDICINE CLINIC | Age: 59
End: 2019-10-17

## 2019-10-21 ENCOUNTER — TELEPHONE (OUTPATIENT)
Dept: INTERNAL MEDICINE CLINIC | Age: 59
End: 2019-10-21

## 2019-10-21 NOTE — TELEPHONE ENCOUNTER
----- Message from Nikki Rolle sent at 10/19/2019  8:31 AM EDT -----  Regarding: Non-Urgent Medical Question  Contact: [phone]  My [address] ([employer]) offers incentives for controlling my A1C. I thought I could just send them the test results from my chart but evidently they either need the results on [facility] or from the Dr. Daniella Patel. I have attached what they are looking for. If at all possible, could Dr. Mackenzie Chua sign results for both my 21 Jan 2019 A1C test and my 03 Jul 2019 A1C test.     Your help is very much appreciated.    Thank Geo Esposito

## 2019-12-09 ENCOUNTER — OFFICE VISIT (OUTPATIENT)
Dept: INTERNAL MEDICINE CLINIC | Age: 59
End: 2019-12-09

## 2019-12-09 VITALS
SYSTOLIC BLOOD PRESSURE: 136 MMHG | DIASTOLIC BLOOD PRESSURE: 82 MMHG | HEART RATE: 73 BPM | HEIGHT: 69 IN | RESPIRATION RATE: 14 BRPM | BODY MASS INDEX: 36.58 KG/M2 | OXYGEN SATURATION: 97 % | WEIGHT: 247 LBS | TEMPERATURE: 98.5 F

## 2019-12-09 DIAGNOSIS — H91.93 BILATERAL HEARING LOSS, UNSPECIFIED HEARING LOSS TYPE: Primary | ICD-10-CM

## 2019-12-09 DIAGNOSIS — H93.13 TINNITUS OF BOTH EARS: ICD-10-CM

## 2019-12-09 DIAGNOSIS — R25.3 MUSCLE FASCICULATION: ICD-10-CM

## 2019-12-09 NOTE — PROGRESS NOTES
Vester Koyanagi presents today for   Chief Complaint   Patient presents with    Ringing in Ear     high pitch ringing(bilatera but more ringing in left) no pain x 1 week or so. bp more elevated then usual.    Heart Problem      pt stated he feels like a buzz senation near heart several times a day x 10 days              Depression Screening:  3 most recent PHQ Screens 7/9/2019   Little interest or pleasure in doing things Not at all   Feeling down, depressed, irritable, or hopeless Not at all   Total Score PHQ 2 0       Learning Assessment:  Learning Assessment 7/9/2019   PRIMARY LEARNER Patient   HIGHEST LEVEL OF EDUCATION - PRIMARY LEARNER  SOME COLLEGE   BARRIERS PRIMARY LEARNER NONE   CO-LEARNER CAREGIVER No   PRIMARY LANGUAGE ENGLISH   LEARNER PREFERENCE PRIMARY VIDEOS     -   ANSWERED BY Kellen Garcia   RELATIONSHIP SELF       Abuse Screening:  Abuse Screening Questionnaire 7/9/2019   Do you ever feel afraid of your partner? N   Are you in a relationship with someone who physically or mentally threatens you? N   Is it safe for you to go home? Y       Fall Risk  No flowsheet data found. Health Maintenance Due   Topic Date Due    FOOT EXAM Q1  01/02/2020    HEMOGLOBIN A1C Q6M  01/03/2020         Coordination of Care:  1. Have you been to the ER, urgent care clinic since your last visit? Hospitalized since your last visit? no    2. Have you seen or consulted any other health care providers outside of the [address] [place] [address] since your last visit? Include any pap smears or colon screening.  no

## 2019-12-09 NOTE — PROGRESS NOTES
60 y.o. WHITE OR  male who presents for evaluation. His wife has told him he has been having hearing loss for some time now. She has repeat herself, he also has the volume higher than before. About 10 days ago, he noted onset of tinnitus worse on the left side. He describes it as a ringing high-pitched sensation, specifically between 4 and 5 kHz (he used to be a ) no ear pain, actual dizziness, minimal sinus symptoms, sore throat, no fevers. Secondly, he has what sounds like fasciculations in his left chest wall area, no actual cardiac or pulmonary chest pain. Does not recall any specific precipitating events    Past Medical History:   Diagnosis Date    Arthritis 2018    Dr Sumeet Morales; right shoulder on bone scan    Breast mass, right 2018    spontaneously resolved, saw Dr Gonzales File venous hypertension without complications 82/86/8882    Dr Baron Tarango Diabetes [facility]) 2016    on basis of fbs>125; intol metformin, sglt2 not covered    Diverticulosis     and polyp 12/13 Dr Umberto Morales    Dyslipidemia     calculatd 10 year risk score 13.6% (9/15); 11.1% (3/16)    Fatty liver     on biopsy 2000 in [place];  Fib-4 was 1.25 from 3/15    H/O echocardiogram 08/2018    ef 61%, mild conc lvh, no wma, tr MR, pap 29mmHg    Heel spur     left    Hypertension     IFG (impaired fasting glucose)     Morbid obesity (HCC)     peak weight 255 lbs, bmi 37.6 from 3/12; dec emilia supp, med sup wt loss, bariatrics; IF 5/18 start weight 258 lbs but unable to comply    Pancreatitis 10/14    and cholecystitis Dr Valiente Scot Tobacco dependence syndrome      Past Surgical History:   Procedure Laterality Date    HX CHOLECYSTECTOMY  12/14    Dr Alida Hancock HX COLONOSCOPY      negative 2000; diverticulosis and polyps 2013 Dr Umberto Morales    HX HEENT  09/2017    US thyroid negative    HX ORTHOPAEDIC  2015    left medial meniscus tear Dr Dom Wilson      testicular tortion    HX VASECTOMY      VASCULAR SURGERY PROCEDURE UNLIST  9/15    left GSV and EVLT Dr Flory Mcwilliams History     Socioeconomic History    Marital status:      Spouse name: Not on file    Number of children: 1    Years of education: Not on file    Highest education level: Not on file   Occupational History    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 of cigarette smoking   Substance and Sexual Activity    Alcohol use: Yes     Comment: social    Drug use: No    Sexual activity: Not on file   Lifestyle    Physical activity:     Days per week: Not on file     Minutes per session: Not on file    Stress: Not on file   Relationships    Social connections:     Talks on phone: Not on file     Gets together: Not on file     Attends Latter-[employer] service: Not on file     Active member of club or organization: Not on file     Attends meetings of clubs or organizations: Not on file     Relationship status: Not on file    Intimate partner violence:     Fear of current or ex partner: Not on file     Emotionally abused: Not on file     Physically abused: Not on file     Forced sexual activity: Not on file   Other Topics Concern    Not on file   Social History Narrative    Not on file     Current Outpatient Medications   Medication Sig    glucose blood VI test strips (ACCU-CHEK JONATHAN PLUS TEST STRP) strip TEST TWO TIMES A DAY    melatonin 10 mg tab Take 10 mg by mouth daily.     amLODIPine (NORVASC) 5 mg tablet TAKE 1 TABLET BY MOUTH DAILY    atorvastatin (LIPITOR) 10 mg tablet TAKE 1 TABLET BY MOUTH ONCE A DAY    TRULICITY 1.5 [place]/6.4 mL sub-q pen INJECT 1.5 MG (0.5) UNDER THE SKIN ONCE WEEKLY    lancets (ACCU-CHEK FASTCLIX LANCING DEV) misc Pt checks blood sugar twice daily, Dx E11.9    Lancets (ACCU-CHEK MULTICLIX LANCET) misc TEST TWO TIMES A DAY. DX:E11.9    coenzyme q10-vitamin e (COQ10 ) 100-100 mg-unit Cap Take  by mouth. No current facility-administered medications for this visit. Allergies   Allergen Reactions    Metformin (Bulk) Diarrhea    Pcn [Penicillins] Swelling     REVIEW OF SYSTEMS:   Ophtho  no vision change or eye pain  Oral  no mouth pain, tongue or tooth problems  Ears  no ear pain, fullness, no swallowing problems  Cardiac  no CP, PND, orthopnea, edema, palpitations or syncope  Chest  no breast masses  Resp  no wheezing, chronic coughing, dyspnea  GI  no heartburn, nausea, vomiting, change in bowel habits, bleeding, hemorrhoids  Urinary  no dysuria, hematuria, flank pain, urgency, frequency    Visit Vitals  /82   Pulse 73   Temp 98.5 °F (36.9 °C) (Oral)   Resp 14   Ht 5' 9\" (1.753 m)   Wt 247 lb (112 kg)   SpO2 97%   BMI 36.48 kg/m²   A&O x3  Affect is appropriate. Mood stable  No apparent distress  Anicteric, no JVD, adenopathy or thyromegaly. No carotid bruits or radiated murmur  Lungs clear to auscultation, no wheezes or rales  Heart showed regular rate and rhythm. No murmur, rubs, gallops  Abdomen soft nontender, no hepatosplenomegaly or masses. Extremities without edema. Pulses 1-2+ symmetrically    Assessment and plan:  1. Tinnitus and hearing loss. Will get second opinion from ENT  2. Muscle fasciculations. Observation. Above conditions discussed at length and patient vocalized understanding.   All questions answered to patient satisfaction

## 2020-01-02 ENCOUNTER — HOSPITAL ENCOUNTER (OUTPATIENT)
Dept: LAB | Age: 60
Discharge: HOME | End: 2020-01-02
Payer: COMMERCIAL

## 2020-01-02 DIAGNOSIS — E08.29 DIABETES MELLITUS DUE TO UNDERLYING CONDITION, CONTROLLED, WITH MICROALBUMINURIA, WITHOUT LONG-TERM CURRENT USE OF INSULIN (HCC): ICD-10-CM

## 2020-01-02 DIAGNOSIS — R80.9 DIABETES MELLITUS DUE TO UNDERLYING CONDITION, CONTROLLED, WITH MICROALBUMINURIA, WITHOUT LONG-TERM CURRENT USE OF INSULIN (HCC): ICD-10-CM

## 2020-01-02 LAB
ALBUMIN SERPL-MCNC: 4.3 G/DL (ref 3.4–5)
ALBUMIN/GLOB SERPL: 1.3 {RATIO} (ref 0.8–1.7)
ALP SERPL-CCNC: 47 U/L (ref 45–117)
ALT SERPL-CCNC: 62 U/L (ref 16–61)
ANION GAP SERPL CALC-SCNC: 5 MMOL/L (ref 3–18)
AST SERPL-CCNC: 34 U/L (ref 10–38)
BILIRUB SERPL-MCNC: 1 MG/DL (ref 0.2–1)
BUN SERPL-MCNC: 17 MG/DL (ref 7–18)
BUN/CREAT SERPL: 19 (ref 12–20)
CALCIUM SERPL-MCNC: 9.2 MG/DL (ref 8.5–10.1)
CHLORIDE SERPL-SCNC: 107 MMOL/L (ref 100–111)
CHOLEST SERPL-MCNC: 134 MG/DL
CO2 SERPL-SCNC: 27 MMOL/L (ref 21–32)
CREAT SERPL-MCNC: 0.9 MG/DL (ref 0.6–1.3)
ERYTHROCYTE [DISTWIDTH] IN BLOOD BY AUTOMATED COUNT: 12.5 % (ref 11.6–14.5)
GLOBULIN SER CALC-MCNC: 3.4 G/DL (ref 2–4)
GLUCOSE SERPL-MCNC: 138 MG/DL (ref 74–99)
HBA1C MFR BLD: 5.6 % (ref 4.2–5.6)
HCT VFR BLD AUTO: 49.8 % (ref 36–48)
HDLC SERPL-MCNC: 43 MG/DL (ref 40–60)
HDLC SERPL: 3.1 {RATIO} (ref 0–5)
HGB BLD-MCNC: 17.8 G/DL (ref 13–16)
LDLC SERPL CALC-MCNC: 55 MG/DL (ref 0–100)
LIPID PROFILE,FLP: ABNORMAL
MCH RBC QN AUTO: 30.2 PG (ref 24–34)
MCHC RBC AUTO-ENTMCNC: 35.7 G/DL (ref 31–37)
MCV RBC AUTO: 84.4 FL (ref 74–97)
PLATELET # BLD AUTO: 214 K/UL (ref 135–420)
PMV BLD AUTO: 10.9 FL (ref 9.2–11.8)
POTASSIUM SERPL-SCNC: 4.2 MMOL/L (ref 3.5–5.5)
PROT SERPL-MCNC: 7.7 G/DL (ref 6.4–8.2)
RBC # BLD AUTO: 5.9 M/UL (ref 4.7–5.5)
SODIUM SERPL-SCNC: 139 MMOL/L (ref 136–145)
TRIGL SERPL-MCNC: 180 MG/DL (ref ?–150)
VLDLC SERPL CALC-MCNC: 36 MG/DL
WBC # BLD AUTO: 11.1 K/UL (ref 4.6–13.2)

## 2020-01-02 PROCEDURE — 80053 COMPREHEN METABOLIC PANEL: CPT

## 2020-01-02 PROCEDURE — 36415 COLL VENOUS BLD VENIPUNCTURE: CPT

## 2020-01-02 PROCEDURE — 85027 COMPLETE CBC AUTOMATED: CPT

## 2020-01-02 PROCEDURE — 83036 HEMOGLOBIN GLYCOSYLATED A1C: CPT

## 2020-01-02 PROCEDURE — 80061 LIPID PANEL: CPT

## 2020-01-02 NOTE — PROGRESS NOTES
60 y.o. WHITE OR  male who presents for evaluation. Also here for med clearance p[rior to planned LEFT TKR by Dr Heather Caba on 1/21/20    Denies polyuria, polydipsia, nocturia, vision change. Sugars are in good range when he checks. Weight is up a little through the holidays. Also not able to do much walking due to orthopedic issues     Vitals 1/8/2020 12/9/2019 7/9/2019 1/28/2019   Weight 241 lb 247 lb 235 lb 233 lb     No cp, sob, pnd, edema, palpitations or syncope. No gi or gu complaints    He retired and is taking lots of naps. His wife has noted that he snores but not really overly fatigued, no chronic headache, bp controlled    He has had cold sx a couple days, treating sx and not interested in prescriptions at this time    Past Medical History:   Diagnosis Date    Arthritis 2018    Dr Qamar Ann; right shoulder on bone scan    Breast mass, right 2018    spontaneously resolved, saw Dr Roseline Fields venous hypertension without complications 57/70/5053    Dr Aric Scanlon Diabetes [facility]) 2016    on basis of fbs>125; intol metformin, sglt2 not covered    Diverticulosis     and polyp 12/13 Dr Brown     Dyslipidemia     calculatd 10 year risk score 13.6% (9/15); 11.1% (3/16)    Fatty liver     on biopsy 2000 in ;  Fib-4 was 1.25 from 3/15    H/O echocardiogram 08/2018    ef 61%, mild conc lvh, no wma, tr MR, pap 29mmHg    Heel spur     left    Hypertension     IFG (impaired fasting glucose)     Morbid obesity (HCC)     peak weight 255 lbs, bmi 37.6 from 3/12; dec emilia supp, med sup wt loss, bariatrics; IF 5/18 start weight 258 lbs but unable to comply    Pancreatitis 10/14    and cholecystitis Dr Dimas Lewis Tobacco dependence syndrome      Past Surgical History:   Procedure Laterality Date    HX CHOLECYSTECTOMY  12/14    Dr Hua Bauman HX COLONOSCOPY      negative 2000; diverticulosis and polyps 2013 Dr Brown     HX HEENT  09/2017    US thyroid negative    HX ORTHOPAEDIC  2015    left medial meniscus tear Dr Taya Krishnamurthy HX UROLOGICAL      testicular tortion    HX VASECTOMY      VASCULAR SURGERY PROCEDURE UNLIST  9/15    left GSV and EVLT Dr Fabienne Meza History     Socioeconomic History    Marital status:      Spouse name: Not on file    Number of children: 1    Years of education: Not on file    Highest education level: Not on file   Occupational History    Occupation: proj engr civilian working for [employer] strain: Not on file    Food insecurity:     Worry: Not on file     Inability: Not on file   "[employer] needs:     Medical: Not on file     Non-medical: Not on file   Tobacco Use    Smoking status: Current Every Day Smoker     Packs/day: 1.00     Years: 8.00     Pack years: 8.00     Types: Cigars    Smokeless tobacco: Never Used    Tobacco comment: smokes on cigar per day, previous hx. of cigarette smoking   Substance and Sexual Activity    Alcohol use: Yes     Comment: social    Drug use: No    Sexual activity: Not on file   Lifestyle    Physical activity:     Days per week: Not on file     Minutes per session: Not on file    Stress: Not on file   Relationships    Social connections:     Talks on phone: Not on file     Gets together: Not on file     Attends Sabianism service: Not on file     Active member of club or organization: Not on file     Attends meetings of clubs or organizations: Not on file     Relationship status: Not on file    Intimate partner violence:     Fear of current or ex partner: Not on file     Emotionally abused: Not on file     Physically abused: Not on file     Forced sexual activity: Not on file   Other Topics Concern    Not on file   Social History Narrative    Not on file     Current Outpatient Medications   Medication Sig    icosapent ethyl (VASCEPA) 1 gram capsule Take 2 Caps by mouth two (2) times daily (with meals).     OTHER     glucose blood VI test strips (ACCU-CHEK JONATHAN PLUS TEST STRP) strip TEST TWO TIMES A DAY    amLODIPine (NORVASC) 5 mg tablet TAKE 1 TABLET BY MOUTH DAILY    atorvastatin (LIPITOR) 10 mg tablet TAKE 1 TABLET BY MOUTH ONCE A DAY    TRULICITY 1.5 BG/3.3 mL sub-q pen INJECT 1.5 MG (0.5) UNDER THE SKIN ONCE WEEKLY    lancets (ACCU-CHEK FASTCLIX LANCING DEV) misc Pt checks blood sugar twice daily, Dx E11.9    Lancets (ACCU-CHEK MULTICLIX LANCET) misc TEST TWO TIMES A DAY. DX:E11.9    coenzyme q10-vitamin e (COQ10 ) 100-100 mg-unit Cap Take  by mouth.  melatonin 10 mg tab Take 10 mg by mouth daily. No current facility-administered medications for this visit. Allergies   Allergen Reactions    Metformin (Bulk) Diarrhea    Pcn [Penicillins] Swelling     REVIEW OF SYSTEMS: colo 12/13 Dr Arthur Bajwa  Ophtho  no vision change or eye pain  Oral  no mouth pain, tongue or tooth problems  Ears  no hearing loss, ear pain, fullness, no swallowing problems  Cardiac  no CP, PND, orthopnea, edema, palpitations or syncope  Chest  no breast masses. Resp  no wheezing, chronic coughing, dyspnea  GI  no heartburn, nausea, vomiting, change in bowel habits, bleeding, hemorrhoids  Urinary  no dysuria, hematuria, flank pain, urgency, frequency    Visit Vitals  /84   Pulse 75   Temp 98.6 °F (37 °C) (Oral)   Resp 14   Ht 5' 9\" (1.753 m)   Wt 241 lb (109.3 kg)   SpO2 97%   BMI 35.59 kg/m²   neck size>17 in  A&O x3  Affect is appropriate. Mood stable  No apparent distress  Anicteric, no JVD, adenopathy. No carotid bruits or radiated murmur  Lungs clear to auscultation, no wheezes or rales  Heart showed regular rate and rhythm. No murmur, rubs, gallops  Abdomen soft nontender, no hepatosplenomegaly or masses.    Extremities without edema, venous varicosities noted    LABS    From 1/07 showed   gluc 108, cr 0.90,             alt 110,  hba1c 5.3,                   chol 212, tg 129, hdl 51, ldl-c 135, wbc 8.1, hb 17.7, plt 214, ast 67  From 3/12 showed   gluc 128, cr 0.92, gfr 95,                hba1c 5.7, ldl-p 1412,                 wbc 7.0, hb 17.0, plt 211, tsh 2.13  From 4/12 showed   gluc 95,   cr 0.89, gfr 98,  alt 57,    hba1c 5.7,                   chol 163, tg 120, hdl 47, ldl-c 92  From 6/12 showed   gluc 90,   cr 0.80, gfr 85,  alt 19,    hba1c 5.2, ldl-p 1416  From 1/13 showed   gluc 96,   cr 0.88, gfr 98,  alt 24,    hba1c 5.1, ldl-p 1421, chol 185, tg 125, hdl 49, ldl-c 111   From 10/14 showed gluc 123, cr 1.00, gfr>60, alt 59,                    wbc 9.6, hb 16.7, plt 222, ast 47, ap 69, tb 4.7, lip 7684, ck/trop neg  From 3/15 showed   gluc 118, cr 0.81, gfr>60, alt 57,    hba1c 5.9,        chol 184, tg 200, hdl 44, ldl-c 100, wbc 8.0, hb 17.1, plt 216, tsh 2.23  From 9/15 showed   gluc 117, cr 0.90, gfr 95,       hba1c 6.0,        chol 200, tg 176, hdl 46, ldl-c 119  From 3/16 showed           hba1c 5.7,        chol 169, tg 154, hdl 45, ldl-c 93,           tsh 2.09  From 9/16 showed   gluc 132, cr 0.77, gfr>60,       hba1c 5.8,        chol 162, tg 160, hdl 43, ldl-c 87,   wbc 8.3, hb 16.3, plt 210  From 4/17 showed   gluc 140, cr 0.80, gfr>60,       hba1c 6.5, umar 12                From 8/17 showed   gluc 145, cr 0.82, gfr>60,       hba1c 6.3,        chol 174, tg 174, hdl 44, ldl-c 95  From 11/17 showed         alt 82,                 ast 44, ap 57, tb 0.7  From 1/18 showed   gluc 185, cr 0.78, gfr>60,  alt 63,   hba1c 6.7,        chol 138, tg 174, hdl 51, ldl-c 52  From 5/18 showed   gluc 170, cr 0.79, gfr>60,  alt 77,   hba1c 7.2,        chol 138, tg 142, hdl 40, ldl-c 50,          ast 49, ap 60, tb 0.8, psa 0.2  From 10/18 showed gluc 236, cr 0.86, gfr>60,       hba1c 8.8,  umar 54  From 1/19 showed   gluc 109, cr 0.90, gfr>60,  alt 57,   hba1c 5.8,  umar 18,  chol 117, tg 179, hdl 40, ldl-c 41,            From 7/19 showed           hba1c 5.5,  umar 18,                    psa 0.40    Results for orders placed or performed during the hospital encounter of 01/02/20   CBC W/O DIFF   Result Value Ref Range    WBC 11.1 4.6 - 13.2 K/uL    RBC 5.90 (H) 4.70 - 5.50 M/uL    HGB 17.8 (H) 13.0 - 16.0 g/dL    HCT 49.8 (H) 36.0 - 48.0 %    MCV 84.4 74.0 - 97.0 FL    MCH 30.2 24.0 - 34.0 PG    MCHC 35.7 31.0 - 37.0 g/dL    RDW 12.5 11.6 - 14.5 %    PLATELET 199 849 - 799 K/uL    MPV 10.9 9.2 - 11.8 FL   LIPID PANEL   Result Value Ref Range    LIPID PROFILE          Cholesterol, total 134 <200 MG/DL    Triglyceride 180 (H) <150 MG/DL    HDL Cholesterol 43 40 - 60 MG/DL    LDL, calculated 55 0 - 100 MG/DL    VLDL, calculated 36 MG/DL    CHOL/HDL Ratio 3.1 0 - 5.0     METABOLIC PANEL, COMPREHENSIVE   Result Value Ref Range    Sodium 139 136 - 145 mmol/L    Potassium 4.2 3.5 - 5.5 mmol/L    Chloride 107 100 - 111 mmol/L    CO2 27 21 - 32 mmol/L    Anion gap 5 3.0 - 18 mmol/L    Glucose 138 (H) 74 - 99 mg/dL    BUN 17 7.0 - 18 MG/DL    Creatinine 0.90 0.6 - 1.3 MG/DL    BUN/Creatinine ratio 19 12 - 20      GFR est AA >60 >60 ml/min/1.73m2    GFR est non-AA >60 >60 ml/min/1.73m2    Calcium 9.2 8.5 - 10.1 MG/DL    Bilirubin, total 1.0 0.2 - 1.0 MG/DL    ALT (SGPT) 62 (H) 16 - 61 U/L    AST (SGOT) 34 10 - 38 U/L    Alk. phosphatase 47 45 - 117 U/L    Protein, total 7.7 6.4 - 8.2 g/dL    Albumin 4.3 3.4 - 5.0 g/dL    Globulin 3.4 2.0 - 4.0 g/dL    A-G Ratio 1.3 0.8 - 1.7     HEMOGLOBIN A1C W/O EAG   Result Value Ref Range    Hemoglobin A1c 5.6 4.2 - 5.6 %     PREOP  EKG showed nsr, rate 65, leftward axis, no acute  CXR showed ALEA  From 12/19 showed gluc 151, cr 0.80, gfr>60, wbc 9.4, hb 17.4, plt 220, inr 1.0, ptt 25, ua neg      Patient Active Problem List   Diagnosis Code    Dyslipidemia E78.5    Colon polyp Dr Isabella Adair 12/13 K63.5    Chronic venous hypertension without complications F85.878    Primary hypertension I10    Tobacco dependence syndrome F17.200    Diverticulosis  K57.30    Severe obesity (BMI 35.0-39. 9) with comorbidity ([facility] [address] E66.01    Diabetes mellitus due to underlying condition, controlled, with microalbuminuria, without long-term current use of insulin ([facility]) E08.29, R80.9     Assessment and plan:  1. Hypertension. Continue current   2. Obesity. Lifestyle and dietary measures. Portion control reiterated. 3.  Fatty liver. Wt loss as above  4. DM. Doing well, f/u ophth  5. Dyslipidemia. Continue lipitor. Discussed recent vascepa recs and willing to start assuming covered  6. Smoking cessation reiterated  7. Vascular. F/U Dr Paul Castillo  8. Ortho. He is felt to be average risk for the planned procedure, no contraindications noted. Routine postop prophylaxis per protocol. If needed, cover with correction insulin per hospital protocol  9. Asked him to ask his wife to observe for apnea w the snoring and erythrocytosis being noted      RTC 7/20    Above conditions discussed at length and patient vocalized understanding.   All questions answered to patient satisfaction

## 2020-01-08 ENCOUNTER — TELEPHONE (OUTPATIENT)
Dept: INTERNAL MEDICINE CLINIC | Age: 60
End: 2020-01-08

## 2020-01-08 ENCOUNTER — OFFICE VISIT (OUTPATIENT)
Dept: INTERNAL MEDICINE CLINIC | Age: 60
End: 2020-01-08

## 2020-01-08 VITALS
SYSTOLIC BLOOD PRESSURE: 118 MMHG | HEIGHT: 69 IN | RESPIRATION RATE: 14 BRPM | HEART RATE: 75 BPM | BODY MASS INDEX: 35.7 KG/M2 | WEIGHT: 241 LBS | DIASTOLIC BLOOD PRESSURE: 84 MMHG | TEMPERATURE: 98.6 F | OXYGEN SATURATION: 97 %

## 2020-01-08 DIAGNOSIS — I10 ESSENTIAL HYPERTENSION WITH GOAL BLOOD PRESSURE LESS THAN 140/90: ICD-10-CM

## 2020-01-08 DIAGNOSIS — K57.30 DIVERTICULOSIS OF LARGE INTESTINE WITHOUT HEMORRHAGE: ICD-10-CM

## 2020-01-08 DIAGNOSIS — I87.303 CHRONIC VENOUS HYPERTENSION INVOLVING BOTH SIDES: ICD-10-CM

## 2020-01-08 DIAGNOSIS — Z01.818 PREOP EXAM FOR INTERNAL MEDICINE: Primary | ICD-10-CM

## 2020-01-08 DIAGNOSIS — E78.5 DYSLIPIDEMIA: ICD-10-CM

## 2020-01-08 DIAGNOSIS — R80.9 DIABETES MELLITUS DUE TO UNDERLYING CONDITION, CONTROLLED, WITH MICROALBUMINURIA, WITHOUT LONG-TERM CURRENT USE OF INSULIN (HCC): ICD-10-CM

## 2020-01-08 DIAGNOSIS — E08.29 DIABETES MELLITUS DUE TO UNDERLYING CONDITION, CONTROLLED, WITH MICROALBUMINURIA, WITHOUT LONG-TERM CURRENT USE OF INSULIN (HCC): ICD-10-CM

## 2020-01-08 DIAGNOSIS — K63.5 HYPERPLASTIC COLONIC POLYP, UNSPECIFIED PART OF COLON: ICD-10-CM

## 2020-01-08 NOTE — TELEPHONE ENCOUNTER
----- Message from Marcelle Valenzuela sent at 1/8/2020 11:51 AM EST -----  Regarding: Prescription Question  Contact: [phone]  I talked to [facility] and they will cover the Vascepa Presciption. It needs to be pre authorized and that can be done by your office calling [phone]. After preauthorization is obtained the prescription can be filled by the [facility] [address] at [address] [facility], [facility] [place] [phone]. It may take a couple weeks to get it filled but at least it is available. Thank you for your help.    George Garduno

## 2020-01-08 NOTE — PROGRESS NOTES
Poonam Brandt presents today for   Chief Complaint   Patient presents with    Cholesterol Problem     6 month follow up with labs    Pre-op Exam     Left knee replacement with Jeffrey Kohler on 1/21/20              Depression Screening:  3 most recent PHQ Screens 1/8/2020   Little interest or pleasure in doing things Not at all   Feeling down, depressed, irritable, or hopeless Not at all   Total Score PHQ 2 0       Learning Assessment:  Learning Assessment 7/9/2019   PRIMARY LEARNER Patient   HIGHEST LEVEL OF EDUCATION - PRIMARY LEARNER  SOME COLLEGE   BARRIERS PRIMARY LEARNER NONE   CO-LEARNER CAREGIVER No   PRIMARY LANGUAGE ENGLISH   LEARNER PREFERENCE PRIMARY VIDEOS     -   ANSWERED BY Tyesha Juarez   RELATIONSHIP SELF       Abuse Screening:  Abuse Screening Questionnaire 7/9/2019   Do you ever feel afraid of your partner? N   Are you in a relationship with someone who physically or mentally threatens you? N   Is it safe for you to go home? Y       Fall Risk  No flowsheet data found. Health Maintenance Due   Topic Date Due    FOOT EXAM Q1  01/02/2020         Coordination of Care:  1. Have you been to the ER, urgent care clinic since your last visit? Hospitalized since your last visit? no    2. Have you seen or consulted any other health care providers outside of the [address] [place] [address] since your last visit? Include any pap smears or colon screening. YES Dr/. Cyril Lindsay ENT

## 2020-01-08 NOTE — TELEPHONE ENCOUNTER
In regards to below message and script has to be placed before we can submit authorization. Pt stated its the vascepa 1gram. Pt made aware.

## 2020-01-10 RX ORDER — ICOSAPENT ETHYL 1000 MG/1
2 CAPSULE ORAL 2 TIMES DAILY WITH MEALS
Qty: 120 CAP | Refills: 11 | Status: SHIPPED | OUTPATIENT
Start: 2020-01-10 | End: 2020-01-10 | Stop reason: SDUPTHER

## 2020-01-10 RX ORDER — ICOSAPENT ETHYL 1000 MG/1
2 CAPSULE ORAL 2 TIMES DAILY WITH MEALS
Qty: 120 CAP | Refills: 11 | Status: SHIPPED | OUTPATIENT
Start: 2020-01-10 | End: 2020-01-14 | Stop reason: SDUPTHER

## 2020-01-10 NOTE — TELEPHONE ENCOUNTER
Leslie Chowdhury MD at 01/08/20 0820     Status: Signed      pls call [facility] and cancel the vascepa I called there     pls call [facility] and get preauth for vascepa per pt's message - I sent the script to [facility], pls fax to dr Forman Hair has been submitted to pharmacy. Vascepa canceled at [facility].

## 2020-01-10 NOTE — PROGRESS NOTES
pls call [facility] and cancel the vascepa I called there    pls call [facility] and get preauth for vascepa per pt's message - I sent the script to [facility], pls fax to dr Malik Lambert

## 2020-01-14 DIAGNOSIS — E78.5 DYSLIPIDEMIA: ICD-10-CM

## 2020-01-14 NOTE — TELEPHONE ENCOUNTER
----- Message from Samanta Valladares sent at 1/14/2020  9:33 AM EST -----  Regarding: Prescription Question  Contact: [phone]  I just got off the phone with the [facility]. After confirming that  covers the prescription for Vascepa, they informed me that the Pharmacy does not carry it in stock and the prescription should be filled by a civilian pharmacy that accepts [address] [place] [address]. If you could please call this Precscription into the [employer] on [facility] (where all my other scripts are) I would very much appreciate it. Thank you.

## 2020-01-16 RX ORDER — ICOSAPENT ETHYL 1000 MG/1
2 CAPSULE ORAL 2 TIMES DAILY WITH MEALS
Qty: 120 CAP | Refills: 11 | Status: SHIPPED | OUTPATIENT
Start: 2020-01-16 | End: 2021-01-26

## 2020-05-09 DIAGNOSIS — E11.9 CONTROLLED TYPE 2 DIABETES MELLITUS WITHOUT COMPLICATION, WITHOUT LONG-TERM CURRENT USE OF INSULIN (HCC): ICD-10-CM

## 2020-05-11 RX ORDER — DULAGLUTIDE 1.5 MG/.5ML
INJECTION, SOLUTION SUBCUTANEOUS
Qty: 2 EACH | Refills: 10 | Status: SHIPPED | OUTPATIENT
Start: 2020-05-11 | End: 2021-04-01

## 2020-06-08 RX ORDER — LANCETS
EACH MISCELLANEOUS
Qty: 180 EACH | Refills: 11 | Status: SHIPPED | OUTPATIENT
Start: 2020-06-08 | End: 2021-07-02

## 2020-07-06 ENCOUNTER — HOSPITAL ENCOUNTER (OUTPATIENT)
Dept: LAB | Age: 60
Discharge: HOME | End: 2020-07-06
Payer: COMMERCIAL

## 2020-07-06 DIAGNOSIS — E78.5 DYSLIPIDEMIA: ICD-10-CM

## 2020-07-06 DIAGNOSIS — E08.29 DIABETES MELLITUS DUE TO UNDERLYING CONDITION, CONTROLLED, WITH MICROALBUMINURIA, WITHOUT LONG-TERM CURRENT USE OF INSULIN (HCC): ICD-10-CM

## 2020-07-06 DIAGNOSIS — R80.9 DIABETES MELLITUS DUE TO UNDERLYING CONDITION, CONTROLLED, WITH MICROALBUMINURIA, WITHOUT LONG-TERM CURRENT USE OF INSULIN (HCC): ICD-10-CM

## 2020-07-06 LAB
ALBUMIN SERPL-MCNC: 4.3 G/DL (ref 3.4–5)
ALBUMIN/GLOB SERPL: 1.3 {RATIO} (ref 0.8–1.7)
ALP SERPL-CCNC: 50 U/L (ref 45–117)
ALT SERPL-CCNC: 52 U/L (ref 16–61)
AST SERPL-CCNC: 31 U/L (ref 10–38)
BILIRUB DIRECT SERPL-MCNC: 0.3 MG/DL (ref 0–0.2)
BILIRUB SERPL-MCNC: 0.9 MG/DL (ref 0.2–1)
CHOLEST SERPL-MCNC: 118 MG/DL
CREAT UR-MCNC: 99 MG/DL (ref 30–125)
GLOBULIN SER CALC-MCNC: 3.3 G/DL (ref 2–4)
HBA1C MFR BLD: 6.1 % (ref 4.2–5.6)
HDLC SERPL-MCNC: 48 MG/DL (ref 40–60)
HDLC SERPL: 2.5 {RATIO} (ref 0–5)
LDLC SERPL CALC-MCNC: 51.6 MG/DL (ref 0–100)
LIPID PROFILE,FLP: NORMAL
MICROALBUMIN UR-MCNC: 2.05 MG/DL (ref 0–3)
MICROALBUMIN/CREAT UR-RTO: 21 MG/G (ref 0–30)
PROT SERPL-MCNC: 7.6 G/DL (ref 6.4–8.2)
TRIGL SERPL-MCNC: 92 MG/DL (ref ?–150)
VLDLC SERPL CALC-MCNC: 18.4 MG/DL

## 2020-07-06 PROCEDURE — 80076 HEPATIC FUNCTION PANEL: CPT

## 2020-07-06 PROCEDURE — 82043 UR ALBUMIN QUANTITATIVE: CPT

## 2020-07-06 PROCEDURE — 80061 LIPID PANEL: CPT

## 2020-07-06 PROCEDURE — 83036 HEMOGLOBIN GLYCOSYLATED A1C: CPT

## 2020-07-06 PROCEDURE — 36415 COLL VENOUS BLD VENIPUNCTURE: CPT

## 2020-07-15 ENCOUNTER — VIRTUAL VISIT (OUTPATIENT)
Dept: INTERNAL MEDICINE CLINIC | Age: 60
End: 2020-07-15

## 2020-07-15 DIAGNOSIS — E08.29 DIABETES MELLITUS DUE TO UNDERLYING CONDITION, CONTROLLED, WITH MICROALBUMINURIA, WITHOUT LONG-TERM CURRENT USE OF INSULIN (HCC): ICD-10-CM

## 2020-07-15 DIAGNOSIS — E78.5 DYSLIPIDEMIA: ICD-10-CM

## 2020-07-15 DIAGNOSIS — Z12.5 SCREENING FOR MALIGNANT NEOPLASM OF PROSTATE: ICD-10-CM

## 2020-07-15 DIAGNOSIS — R80.9 DIABETES MELLITUS DUE TO UNDERLYING CONDITION, CONTROLLED, WITH MICROALBUMINURIA, WITHOUT LONG-TERM CURRENT USE OF INSULIN (HCC): ICD-10-CM

## 2020-07-15 DIAGNOSIS — E66.01 SEVERE OBESITY (BMI 35.0-39.9) WITH COMORBIDITY (HCC): ICD-10-CM

## 2020-07-15 DIAGNOSIS — F17.200 TOBACCO DEPENDENCE SYNDROME: ICD-10-CM

## 2020-07-15 DIAGNOSIS — I87.303 CHRONIC VENOUS HYPERTENSION INVOLVING BOTH SIDES: ICD-10-CM

## 2020-07-15 DIAGNOSIS — I10 ESSENTIAL HYPERTENSION WITH GOAL BLOOD PRESSURE LESS THAN 140/90: Primary | ICD-10-CM

## 2020-07-15 NOTE — PROGRESS NOTES
Cat Narayan is a 60 y.o. male who was seen by synchronous (real-time) audio-video technology on 7/15/2020 for No chief complaint on file. Assessment & Plan:   Diagnoses and all orders for this visit:    1. Primary hypertension    2. Chronic venous hypertension involving both sides    3. Diabetes mellitus due to underlying condition, controlled, with microalbuminuria, without long-term current use of insulin (HCC)  -     CBC W/O DIFF; Future  -     METABOLIC PANEL, COMPREHENSIVE; Future  -     HEMOGLOBIN A1C W/O EAG; Future    4. Severe obesity (BMI 35.0-39. 9) with comorbidity ([facility]    5. Dyslipidemia    6. Tobacco dependence syndrome    7. Screening for malignant neoplasm of prostate  -     PSA, DIAGNOSTIC (PROSTATE SPECIFIC AG); Future        I spent at least 21 minutes on this visit with this established patient. 712  Subjective:     Objective:     Patient-Reported Vitals 7/15/2020   Patient-Reported Height 5' 9'   Patient-Reported Pulse 72   Patient-Reported Temperature 97.6   Patient-Reported Systolic  212   Patient-Reported Diastolic 78      General: alert, cooperative, no distress   Mental  status: normal mood, behavior, speech, dress, motor activity, and thought processes, able to follow commands   HENT: NCAT   Neck: no visualized mass   Resp: no respiratory distress   Neuro: no gross deficits   Skin: no discoloration or lesions of concern on visible areas   Psychiatric: normal affect, consistent with stated mood, no evidence of hallucinations     Additional exam findings: We discussed the expected course, resolution and complications of the diagnosis(es) in detail. Medication risks, benefits, costs, interactions, and alternatives were discussed as indicated. I advised him to contact the office if his condition worsens, changes or fails to improve as anticipated. He expressed understanding with the diagnosis(es) and plan.        Cat Narayan, who was evaluated through a patient-initiated, synchronous (real-time) audio-video encounter, and/or his healthcare decision maker, is aware that it is a billable service, with coverage as determined by his insurance carrier. He provided verbal consent to proceed: Yes, and patient identification was verified. It was conducted pursuant to the emergency declaration under the [facility], [address] [place] [address] and the [employer] Act. A caregiver was present when appropriate. Ability to conduct physical exam was limited. I was in the office. The patient was at home. Bernadette Flores MD    He's relatively happy with the knee replacement outcomes. He's doing the bike but not jogging as yet, has f'/u with Dr Alma Singleton next week    Denies polyuria, polydipsia, nocturia, vision change. Sugars are in good range when he checks. We started vascepa at the last visit, he's using 1/2 usual dose and doing well as below    No cp, sob, pnd, edema, palpitations or syncope. No gi or gu complaints    He does feel tired and has been noted to snore but no witnessed apnea; he suspects he has kristen but not wanting to get evaluated with the pandemic going on    Past Medical History:   Diagnosis Date    Arthritis 2018    Dr Janine Sellers; right shoulder on bone scan    Breast mass, right 2018    spontaneously resolved, saw Dr Rachel Smoker venous hypertension without complications 35/51/9685    Dr Wilman Frederick polyp 12/2013    DR Rhonda Dominguez; and divertics    Diabetes ([facility] [address] 2016    on basis of fbs>125; intol metformin, sglt2 not covered    Dyslipidemia     calculated 10 year risk score 13.6% (9/15); 11.1% (3/16)    Fatty liver     on biopsy 2000 in VB;  Fib-4 was 1.25 from 3/15    H/O echocardiogram 08/2018    ef 61%, mild conc lvh, no wma, tr MR, pap 29mmHg    Heel spur     LEFT    Hypertension     IFG (impaired fasting glucose) [id number]    Morbid obesity ([facility]     peak weight 255 lbs, bmi 37.6 from 3/12; dec emilia supp, med sup wt loss, bariatrics; IF 5/18 start weight 258 lbs but unable to comply    Pancreatitis 10/14    and cholecystitis Dr Bhargav Ortiz Tobacco dependence syndrome      Past Surgical History:   Procedure Laterality Date    HX CHOLECYSTECTOMY  12/14    Dr Luz Garcia HX COLONOSCOPY      negative 2000; diverticulosis and polyps 2013 Dr Devi Cox    HX HEENT  09/2017    US thyroid negative    HX ORTHOPAEDIC  2015    LEFT medial meniscus tear Dr Demarco Bolton HX ORTHOPAEDIC  01/2020    LEFT TKR Dr Boby hCoi HX UROLOGICAL      testicular torsion    HX VASECTOMY      VASCULAR SURGERY PROCEDURE UNLIST  09/2015    LEFT GSV and EVLT Dr Branden Chamberlain History     Socioeconomic History    Marital status:      Spouse name: Not on file    Number of children: 1    Years of education: Not on file    Highest education level: Not on file   Occupational History    Occupation: proj engr civilian working for [employer] strain: Not on file    Food insecurity     Worry: Not on file     Inability: Not on file   [employer] needs     Medical: Not on file     Non-medical: Not on file   Tobacco Use    Smoking status: Current Every Day Smoker     Packs/day: 1.00     Years: 8.00     Pack years: 8.00     Types: Cigars    Smokeless tobacco: Never Used    Tobacco comment: smokes on cigar per day, previous hx. of cigarette smoking   Substance and Sexual Activity    Alcohol use: Yes     Comment: social    Drug use: No    Sexual activity: Not on file   Lifestyle    Physical activity     Days per week: Not on file     Minutes per session: Not on file    Stress: Not on file   Relationships    Social connections     Talks on phone: Not on file     Gets together: Not on file     Attends Quaker service: Not on file     Active member of club or organization: Not on file     Attends meetings of clubs or organizations: Not on file     Relationship status: Not on file    Intimate partner violence     Fear of current or ex partner: Not on file     Emotionally abused: Not on file     Physically abused: Not on file     Forced sexual activity: Not on file   Other Topics Concern    Not on file   Social History Narrative    Not on file     Current Outpatient Medications   Medication Sig    lancets (Accu-Chek FastClix Lancing Dev) misc Pt checks blood sugar twice daily, Dx E11.9    glucose blood VI test strips (Accu-Chek Siobhan Plus test strp) strip TEST TWO TIMES A DAY    Trulicity 1.5 UB/1.6 mL sub-q pen INJECT 1.5 MG UNDER THE SKIN ONCE WEEKLY    amLODIPine (NORVASC) 5 mg tablet TAKE 1 TABLET BY MOUTH DAILY    atorvastatin (LIPITOR) 10 mg tablet TAKE 1 TABLET BY MOUTH ONCE A DAY    icosapent ethyL (VASCEPA) 1 gram capsule Take 2 Caps by mouth two (2) times daily (with meals).  OTHER     melatonin 10 mg tab Take 10 mg by mouth daily.  Lancets (ACCU-CHEK MULTICLIX LANCET) misc TEST TWO TIMES A DAY. DX:E11.9    coenzyme q10-vitamin e (COQ10 ) 100-100 mg-unit Cap Take  by mouth. No current facility-administered medications for this visit. Allergies   Allergen Reactions    Metformin (Bulk) Diarrhea    Pcn [Penicillins] Swelling     REVIEW OF SYSTEMS: colo 12/13 Dr Princess Wiggins  Ophtho  no vision change or eye pain  Oral  no mouth pain, tongue or tooth problems  Ears  no hearing loss, ear pain, fullness, no swallowing problems  Cardiac  no CP, PND, orthopnea, edema, palpitations or syncope  Chest  no breast masses. Resp  no wheezing, chronic coughing, dyspnea  GI  no heartburn, nausea, vomiting, change in bowel habits, bleeding, hemorrhoids  Urinary  no dysuria, hematuria, flank pain, urgency, frequency    There were no vitals taken for this visit. neck size>17 in  A&O x3  Affect is appropriate. Mood stable  No apparent distress  Anicteric, no JVD, adenopathy.    No carotid bruits or radiated murmur  Lungs clear to auscultation, no wheezes or rales  Heart showed regular rate and rhythm. No murmur, rubs, gallops  Abdomen soft nontender, no hepatosplenomegaly or masses.    Extremities without edema, venous varicosities noted    LABS    From 1/07 showed   gluc 108, cr 0.90,             alt 110,  hba1c 5.3,                   chol 212, tg 129, hdl 51, ldl-c 135, wbc 8.1,   hb 17.7, plt 214, ast 67  From 3/12 showed   gluc 128, cr 0.92, gfr 95,                hba1c 5.7, ldl-p 1412,                 wbc 7.0,   hb 17.0, plt 211, tsh 2.13  From 4/12 showed   gluc 95,   cr 0.89, gfr 98,  alt 57,    hba1c 5.7,                   chol 163, tg 120, hdl 47, ldl-c 92  From 6/12 showed   gluc 90,   cr 0.80, gfr 85,  alt 19,    hba1c 5.2, ldl-p 1416  From 1/13 showed   gluc 96,   cr 0.88, gfr 98,  alt 24,    hba1c 5.1, ldl-p 1421, chol 185, tg 125, hdl 49, ldl-c 111   From 10/14 showed gluc 123, cr 1.00, gfr>60, alt 59,                    wbc 9.6,   hb 16.7, plt 222, ast 47, ap 69, tb 4.7, lip 7684, ck/trop neg  From 3/15 showed   gluc 118, cr 0.81, gfr>60, alt 57,    hba1c 5.9,        chol 184, tg 200, hdl 44, ldl-c 100, wbc 8.0,   hb 17.1, plt 216, tsh 2.23  From 9/15 showed   gluc 117, cr 0.90, gfr 95,       hba1c 6.0,        chol 200, tg 176, hdl 46, ldl-c 119  From 3/16 showed           hba1c 5.7,        chol 169, tg 154, hdl 45, ldl-c 93,           tsh 2.09  From 9/16 showed   gluc 132, cr 0.77, gfr>60,       hba1c 5.8,        chol 162, tg 160, hdl 43, ldl-c 87,   wbc 8.3,   hb 16.3, plt 210  From 4/17 showed   gluc 140, cr 0.80, gfr>60,       hba1c 6.5, umar 12                From 8/17 showed   gluc 145, cr 0.82, gfr>60,       hba1c 6.3,        chol 174, tg 174, hdl 44, ldl-c 95  From 11/17 showed         alt 82,                 ast 44, ap 57, tb 0.7  From 1/18 showed   gluc 185, cr 0.78, gfr>60,  alt 63,   hba1c 6.7,        chol 138, tg 174, hdl 51, ldl-c 52  From 5/18 showed   gluc 170, cr 0.79, gfr>60, alt 77,   hba1c 7.2,        chol 138, tg 142, hdl 40, ldl-c 50,          ast 49, ap 60, tb 0.8, psa 0.2  From 10/18 showed gluc 236, cr 0.86, gfr>60,       hba1c 8.8,  umar 54  From 1/19 showed   gluc 109, cr 0.90, gfr>60,  alt 57,   hba1c 5.8,  umar 18,  chol 117, tg 179, hdl 40, ldl-c 41,            From 7/19 showed           hba1c 5.5,  umar 18,                    psa 0.40  From 12/19 showed gluc 138, cr 0.90, gfr>60,  alt 62,   hba1c 5.6,       chol 134, tg 180, hdl 43, ldl-c 55, wbc 11.1, hb 17.8, plt 214, ast 34, ap 47, tb 1.0  From 1/20 showed gluc 151, cr 0.80, gfr>60,                  wbc 9.4,   hb 17.4, plt 220, inr 1.0, ptt 25, ua neg    Results for orders placed or performed during the hospital encounter of 07/06/20   HEPATIC FUNCTION PANEL   Result Value Ref Range    Protein, total 7.6 6.4 - 8.2 g/dL    Albumin 4.3 3.4 - 5.0 g/dL    Globulin 3.3 2.0 - 4.0 g/dL    A-G Ratio 1.3 0.8 - 1.7      Bilirubin, total 0.9 0.2 - 1.0 MG/DL    Bilirubin, direct 0.3 (H) 0.0 - 0.2 MG/DL    Alk.  phosphatase 50 45 - 117 U/L    AST (SGOT) 31 10 - 38 U/L    ALT (SGPT) 52 16 - 61 U/L   LIPID PANEL   Result Value Ref Range    LIPID PROFILE          Cholesterol, total 118 <200 MG/DL    Triglyceride 92 <150 MG/DL    HDL Cholesterol 48 40 - 60 MG/DL    LDL, calculated 51.6 0 - 100 MG/DL    VLDL, calculated 18.4 MG/DL    CHOL/HDL Ratio 2.5 0 - 5.0     HEMOGLOBIN A1C W/O EAG   Result Value Ref Range    Hemoglobin A1c 6.1 (H) 4.2 - 5.6 %   MICROALBUMIN, UR, RAND W/ MICROALB/CREAT RATIO   Result Value Ref Range    Microalbumin,urine random 2.05 0 - 3.0 MG/DL    Creatinine, urine 99.00 30 - 125 mg/dL    Microalbumin/Creat ratio (mg/g creat) 21 0 - 30 mg/g     Patient Active Problem List   Diagnosis Code    Dyslipidemia E78.5    Colon polyp Dr [facility] 12/13 K63.5    Chronic venous hypertension without complications B30.403    Primary hypertension I10    Tobacco dependence syndrome F17.200    Diverticulosis  K57.30    Severe obesity (BMI 35.0-39. 9) with comorbidity ([facility] E66.01    Diabetes mellitus due to underlying condition, controlled, with microalbuminuria, without long-term current use of insulin (HCC) E08.29, R80.9     Assessment and plan:  1. Hypertension. Continue current   2. Obesity. Lifestyle and dietary measures. Portion control reiterated. 3.  Fatty liver. Wt loss as above  4. DM. Doing well, f/u ophth  5. Dyslipidemia. Continue current regimen. 6. Smoking cessation reiterated  7. Vascular. F/U Dr Guy Salvage  8. Ortho. F/U Dr Gomez Veto  9.  kristen eval when he feels ready        RTC 1/21    Above conditions discussed at length and patient vocalized understanding.   All questions answered to patient satisfaction

## 2021-01-09 NOTE — PROGRESS NOTES
63 y.o. WHITE OR  male who presents for evaluation. He's about '70% better' from the TKR>  Biking is difficult, still some residual swelling and he's not back to his prior walking/jogging regimen. Has appt with Dr Patel Leal in the near future    Denies polyuria, polydipsia, nocturia, vision change. Sugars are in good range when he checks. Weight is unchanged    Vitals 1/18/2021 1/8/2020 12/9/2019 7/9/2019   Weight 227 lb 241 lb 247 lb 235 lb     He's using 1/2 usual dose vascepa and doing well as below    No cp, sob, pnd, edema, palpitations or syncope. No gi or gu complaints    At the last vist, he deferred on going for FERNANDEZ eval until the pandemic abates    Past Medical History:   Diagnosis Date    Arthritis 2018    Dr Daily Hernandes; right shoulder on bone scan    Breast mass, right 2018    spontaneously resolved, saw Dr Nilam Collazo venous hypertension without complications 46/38/2088    Dr Gerardo Ascencio polyp 12/2013    DR Chandu Brown; and divertics    Diabetes ([facility] [address] 2016    on basis of fbs>125; intol metformin, sglt2 not covered    Dyslipidemia     calculated 10 year risk score 13.6% (9/15); 11.1% (3/16)    Fatty liver     on biopsy 2000 in ;  Fib-4 was 1.25 from 3/15    H/O echocardiogram 08/2018    ef 61%, mild conc lvh, no wma, tr MR, pap 29mmHg    Heel spur     LEFT    Hypertension     IFG (impaired fasting glucose) [id number]    Morbid obesity (HCC)     peak weight 255 lbs, bmi 37.6 from 3/12; dec emilia supp, med sup wt loss, bariatrics; IF 5/18 start weight 258 lbs but unable to comply    Pancreatitis 10/14    and cholecystitis Dr Díaz Nikita Tobacco dependence syndrome      Past Surgical History:   Procedure Laterality Date    HX CHOLECYSTECTOMY  12/14    Dr Anabel Marie HX COLONOSCOPY      negative 2000; diverticulosis and polyps 2013 Dr Chandu Brown    HX HEENT  09/2017    US thyroid negative    HX ORTHOPAEDIC  2015    LEFT medial meniscus tear Dr Luisa Jackson ORTHOPAEDIC  01/2020    LEFT TKR Dr Eduardo Koch HX UROLOGICAL      testicular torsion    HX VASECTOMY      VASCULAR SURGERY PROCEDURE UNLIST  09/2015    LEFT GSV and EVLT Dr Adan Ackerman History     Socioeconomic History    Marital status:      Spouse name: Not on file    Number of children: 1    Years of education: Not on file    Highest education level: Not on file   Occupational History    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 of cigarette smoking   Substance and Sexual Activity    Alcohol use: Yes     Comment: social    Drug use: No    Sexual activity: Not on file   Lifestyle    Physical activity     Days per week: Not on file     Minutes per session: Not on file    Stress: Not on file   Relationships    Social connections     Talks on phone: Not on file     Gets together: Not on file     Attends Islam service: Not on file     Active member of club or organization: Not on file     Attends meetings of clubs or organizations: Not on file     Relationship status: Not on file    Intimate partner violence     Fear of current or ex partner: Not on file     Emotionally abused: Not on file     Physically abused: Not on file     Forced sexual activity: Not on file   Other Topics Concern    Not on file   Social History Narrative    Not on file     Current Outpatient Medications   Medication Sig    lancets (Accu-Chek FastClix Lancing Dev) misc Pt checks blood sugar twice daily, Dx E11.9    glucose blood VI test strips (Accu-Chek Siobhan Plus test strp) strip TEST TWO TIMES A DAY    Trulicity 1.5 KO/4.8 mL sub-q pen INJECT 1.5 MG UNDER THE SKIN ONCE WEEKLY    amLODIPine (NORVASC) 5 mg tablet TAKE 1 TABLET BY MOUTH DAILY    atorvastatin (LIPITOR) 10 mg tablet TAKE 1 TABLET BY MOUTH ONCE A DAY    icosapent ethyL (VASCEPA) 1 gram capsule Take 2 Caps by mouth two (2) times daily (with meals).  OTHER     Lancets (ACCU-CHEK MULTICLIX LANCET) misc TEST TWO TIMES A DAY. DX:E11.9    melatonin 10 mg tab Take 10 mg by mouth daily.  coenzyme q10-vitamin e (COQ10 ) 100-100 mg-unit Cap Take  by mouth. No current facility-administered medications for this visit. Allergies   Allergen Reactions    Metformin (Bulk) Diarrhea    Pcn [Penicillins] Swelling     REVIEW OF SYSTEMS: colo 12/13 Dr Renzo Hayes  Ophtho  no vision change or eye pain  Oral  no mouth pain, tongue or tooth problems  Ears  no hearing loss, ear pain, fullness, no swallowing problems  Cardiac  no CP, PND, orthopnea, edema, palpitations or syncope  Chest  no breast masses. Resp  no wheezing, chronic coughing, dyspnea  GI  no heartburn, nausea, vomiting, change in bowel habits, bleeding, hemorrhoids  Urinary  no dysuria, hematuria, flank pain, urgency, frequency    Visit Vitals  /85 (BP 1 Location: Left arm, BP Patient Position: Sitting)   Pulse 63   Temp 98 °F (36.7 °C) (Oral)   Resp 12   Ht 5' 9\" (1.753 m)   Wt 227 lb (103 kg)   SpO2 97%   BMI 33.52 kg/m²   neck size>17 in  A&O x3  Affect is appropriate. Mood stable  No apparent distress  Anicteric, no JVD, adenopathy. No carotid bruits or radiated murmur  Lungs clear to auscultation, no wheezes or rales  Heart showed regular rate and rhythm. No murmur, rubs, gallops  Abdomen soft nontender, no hepatosplenomegaly or masses.    Extremities without edema, venous varicosities noted    LABS    From 1/07 showed   gluc 108, cr 0.90,             alt 110,  hba1c 5.3,                   chol 212, tg 129, hdl 51, ldl-c 135, wbc 8.1, hb 17.7, plt 214, ast 67  From 3/12 showed   gluc 128, cr 0.92, gfr 95,                hba1c 5.7, ldl-p 1412,                 wbc 7.0,   hb 17.0, plt 211, tsh 2.13  From 4/12 showed   gluc 95,   cr 0.89, gfr 98,  alt 57,    hba1c 5.7,                   chol 163, tg 120, hdl 47, ldl-c 92  From 6/12 showed   gluc 90,   cr 0.80, gfr 85,  alt 19,    hba1c 5.2, ldl-p 1416  From 1/13 showed   gluc 96,   cr 0.88, gfr 98,  alt 24,    hba1c 5.1, ldl-p 1421, chol 185, tg 125, hdl 49, ldl-c 111   From 10/14 showed gluc 123, cr 1.00, gfr>60, alt 59,                    wbc 9.6,   hb 16.7, plt 222, ast 47, ap 69, tb 4.7, lip 7684, ck/trop neg  From 3/15 showed   gluc 118, cr 0.81, gfr>60, alt 57,    hba1c 5.9,        chol 184, tg 200, hdl 44, ldl-c 100, wbc 8.0,   hb 17.1, plt 216, tsh 2.23  From 9/15 showed   gluc 117, cr 0.90, gfr 95,       hba1c 6.0,        chol 200, tg 176, hdl 46, ldl-c 119  From 3/16 showed           hba1c 5.7,        chol 169, tg 154, hdl 45, ldl-c 93,           tsh 2.09  From 9/16 showed   gluc 132, cr 0.77, gfr>60,       hba1c 5.8,        chol 162, tg 160, hdl 43, ldl-c 87,   wbc 8.3,   hb 16.3, plt 210  From 4/17 showed   gluc 140, cr 0.80, gfr>60,       hba1c 6.5, umar 12                From 8/17 showed   gluc 145, cr 0.82, gfr>60,       hba1c 6.3,        chol 174, tg 174, hdl 44, ldl-c 95  From 11/17 showed         alt 82,                 ast 44, ap 57, tb 0.7  From 1/18 showed   gluc 185, cr 0.78, gfr>60,  alt 63,   hba1c 6.7,        chol 138, tg 174, hdl 51, ldl-c 52  From 5/18 showed   gluc 170, cr 0.79, gfr>60,  alt 77,   hba1c 7.2,        chol 138, tg 142, hdl 40, ldl-c 50,          ast 49, ap 60, tb 0.8, psa 0.2  From 10/18 showed gluc 236, cr 0.86, gfr>60,       hba1c 8.8,  umar 54  From 1/19 showed   gluc 109, cr 0.90, gfr>60,  alt 57,   hba1c 5.8,  umar 18,  chol 117, tg 179, hdl 40, ldl-c 41,            From 7/19 showed           hba1c 5.5,  umar 18,                    psa 0.40  From 12/19 showed gluc 138, cr 0.90, gfr>60,  alt 62,   hba1c 5.6,       chol 134, tg 180, hdl 43, ldl-c 55, wbc 11.1, hb 17.8, plt 214, ast 34, ap 47, tb 1.0  From 1/20 showed   gluc 151, cr 0.80, gfr>60,                  wbc 9.4,   hb 17.4, plt 220, inr 1.0, ptt 25, ua neg  From 7/20 showed         alt 52,   hba1c 6.1,  umar 21, chol 118, tg 92,   hdl 48, ldl-c 52    Results for orders placed or performed during the hospital encounter of 01/12/21   CBC W/O DIFF   Result Value Ref Range    WBC 9.2 4.6 - 13.2 K/uL    RBC 5.83 (H) 4.70 - 5.50 M/uL    HGB 17.5 (H) 13.0 - 16.0 g/dL    HCT 49.9 (H) 36.0 - 48.0 %    MCV 85.6 74.0 - 97.0 FL    MCH 30.0 24.0 - 34.0 PG    MCHC 35.1 31.0 - 37.0 g/dL    RDW 12.8 11.6 - 14.5 %    PLATELET 500 791 - 941 K/uL    MPV 10.7 9.2 - 80.2 FL   METABOLIC PANEL, COMPREHENSIVE   Result Value Ref Range    Sodium 143 136 - 145 mmol/L    Potassium 4.6 3.5 - 5.5 mmol/L    Chloride 109 100 - 111 mmol/L    CO2 27 21 - 32 mmol/L    Anion gap 7 3.0 - 18 mmol/L    Glucose 107 (H) 74 - 99 mg/dL    BUN 17 7.0 - 18 MG/DL    Creatinine 0.82 0.6 - 1.3 MG/DL    BUN/Creatinine ratio 21 (H) 12 - 20      GFR est AA >60 >60 ml/min/1.73m2    GFR est non-AA >60 >60 ml/min/1.73m2    Calcium 9.1 8.5 - 10.1 MG/DL    Bilirubin, total 0.6 0.2 - 1.0 MG/DL    ALT (SGPT) 42 16 - 61 U/L    AST (SGOT) 17 10 - 38 U/L    Alk.  phosphatase 52 45 - 117 U/L    Protein, total 7.6 6.4 - 8.2 g/dL    Albumin 4.1 3.4 - 5.0 g/dL    Globulin 3.5 2.0 - 4.0 g/dL    A-G Ratio 1.2 0.8 - 1.7     PSA, DIAGNOSTIC (PROSTATE SPECIFIC AG)   Result Value Ref Range    Prostate Specific Ag 0.4 0.0 - 4.0 ng/mL   HEMOGLOBIN A1C W/O EAG   Result Value Ref Range    Hemoglobin A1c 5.5 4.2 - 5.6 %       We reviewed the patient's labs from the last several visits to point out trends in the numbers            Patient Active Problem List   Diagnosis Code    Dyslipidemia E78.5    Colon polyp Dr Brian Tucker 12/13 K63.5    Chronic venous hypertension without complications G62.736    Primary hypertension I10    Tobacco dependence syndrome F17.200    Diverticulosis  K57.30    Severe obesity (BMI 35.0-39. 9) with comorbidity ([facility] [address] E66.01    Diabetes mellitus due to underlying condition, controlled, with microalbuminuria, without long-term current use of insulin (HCC) E08.29, R80.9     Assessment and plan:  1. Hypertension. Continue current   2. Obesity. Lifestyle and dietary measures. Portion control reiterated. 3.  Fatty liver. Wt loss as above  4. DM. Doing well, f/u ophth  5. Dyslipidemia. Continue current regimen. 6.  Smoking cessation reiterated  7. Vascular. F/U Dr Ladd Schirmer  8. Ortho. F/U Dr Swetha Allen as scheduled  9. Gilbert eval when he feels ready        RTC 7/21    Above conditions discussed at length and patient vocalized understanding.   All questions answered to patient satisfaction

## 2021-01-12 ENCOUNTER — HOSPITAL ENCOUNTER (OUTPATIENT)
Dept: LAB | Age: 61
Discharge: HOME | End: 2021-01-12
Payer: COMMERCIAL

## 2021-01-12 DIAGNOSIS — E08.29 DIABETES MELLITUS DUE TO UNDERLYING CONDITION, CONTROLLED, WITH MICROALBUMINURIA, WITHOUT LONG-TERM CURRENT USE OF INSULIN (HCC): ICD-10-CM

## 2021-01-12 DIAGNOSIS — R80.9 DIABETES MELLITUS DUE TO UNDERLYING CONDITION, CONTROLLED, WITH MICROALBUMINURIA, WITHOUT LONG-TERM CURRENT USE OF INSULIN (HCC): ICD-10-CM

## 2021-01-12 DIAGNOSIS — Z12.5 SCREENING FOR MALIGNANT NEOPLASM OF PROSTATE: ICD-10-CM

## 2021-01-12 LAB
ALBUMIN SERPL-MCNC: 4.1 G/DL (ref 3.4–5)
ALBUMIN/GLOB SERPL: 1.2 {RATIO} (ref 0.8–1.7)
ALP SERPL-CCNC: 52 U/L (ref 45–117)
ALT SERPL-CCNC: 42 U/L (ref 16–61)
ANION GAP SERPL CALC-SCNC: 7 MMOL/L (ref 3–18)
AST SERPL-CCNC: 17 U/L (ref 10–38)
BILIRUB SERPL-MCNC: 0.6 MG/DL (ref 0.2–1)
BUN SERPL-MCNC: 17 MG/DL (ref 7–18)
BUN/CREAT SERPL: 21 (ref 12–20)
CALCIUM SERPL-MCNC: 9.1 MG/DL (ref 8.5–10.1)
CHLORIDE SERPL-SCNC: 109 MMOL/L (ref 100–111)
CO2 SERPL-SCNC: 27 MMOL/L (ref 21–32)
CREAT SERPL-MCNC: 0.82 MG/DL (ref 0.6–1.3)
ERYTHROCYTE [DISTWIDTH] IN BLOOD BY AUTOMATED COUNT: 12.8 % (ref 11.6–14.5)
GLOBULIN SER CALC-MCNC: 3.5 G/DL (ref 2–4)
GLUCOSE SERPL-MCNC: 107 MG/DL (ref 74–99)
HBA1C MFR BLD: 5.5 % (ref 4.2–5.6)
HCT VFR BLD AUTO: 49.9 % (ref 36–48)
HGB BLD-MCNC: 17.5 G/DL (ref 13–16)
MCH RBC QN AUTO: 30 PG (ref 24–34)
MCHC RBC AUTO-ENTMCNC: 35.1 G/DL (ref 31–37)
MCV RBC AUTO: 85.6 FL (ref 74–97)
PLATELET # BLD AUTO: 232 K/UL (ref 135–420)
PMV BLD AUTO: 10.7 FL (ref 9.2–11.8)
POTASSIUM SERPL-SCNC: 4.6 MMOL/L (ref 3.5–5.5)
PROT SERPL-MCNC: 7.6 G/DL (ref 6.4–8.2)
PSA SERPL-MCNC: 0.4 NG/ML (ref 0–4)
RBC # BLD AUTO: 5.83 M/UL (ref 4.7–5.5)
SODIUM SERPL-SCNC: 143 MMOL/L (ref 136–145)
WBC # BLD AUTO: 9.2 K/UL (ref 4.6–13.2)

## 2021-01-12 PROCEDURE — 80053 COMPREHEN METABOLIC PANEL: CPT

## 2021-01-12 PROCEDURE — 83036 HEMOGLOBIN GLYCOSYLATED A1C: CPT

## 2021-01-12 PROCEDURE — 85027 COMPLETE CBC AUTOMATED: CPT

## 2021-01-12 PROCEDURE — 36415 COLL VENOUS BLD VENIPUNCTURE: CPT

## 2021-01-12 PROCEDURE — 84153 ASSAY OF PSA TOTAL: CPT

## 2021-01-18 ENCOUNTER — OFFICE VISIT (OUTPATIENT)
Dept: INTERNAL MEDICINE CLINIC | Age: 61
End: 2021-01-18
Payer: COMMERCIAL

## 2021-01-18 VITALS
WEIGHT: 227 LBS | SYSTOLIC BLOOD PRESSURE: 138 MMHG | HEIGHT: 69 IN | BODY MASS INDEX: 33.62 KG/M2 | DIASTOLIC BLOOD PRESSURE: 85 MMHG | RESPIRATION RATE: 12 BRPM | TEMPERATURE: 98 F | HEART RATE: 63 BPM | OXYGEN SATURATION: 97 %

## 2021-01-18 DIAGNOSIS — I87.303 CHRONIC VENOUS HYPERTENSION INVOLVING BOTH SIDES: ICD-10-CM

## 2021-01-18 DIAGNOSIS — R80.9 DIABETES MELLITUS DUE TO UNDERLYING CONDITION, CONTROLLED, WITH MICROALBUMINURIA, WITHOUT LONG-TERM CURRENT USE OF INSULIN (HCC): Primary | ICD-10-CM

## 2021-01-18 DIAGNOSIS — E08.29 DIABETES MELLITUS DUE TO UNDERLYING CONDITION, CONTROLLED, WITH MICROALBUMINURIA, WITHOUT LONG-TERM CURRENT USE OF INSULIN (HCC): Primary | ICD-10-CM

## 2021-01-18 DIAGNOSIS — K57.30 DIVERTICULOSIS OF LARGE INTESTINE WITHOUT HEMORRHAGE: ICD-10-CM

## 2021-01-18 DIAGNOSIS — E78.5 DYSLIPIDEMIA: ICD-10-CM

## 2021-01-18 DIAGNOSIS — I10 ESSENTIAL HYPERTENSION WITH GOAL BLOOD PRESSURE LESS THAN 140/90: ICD-10-CM

## 2021-01-18 DIAGNOSIS — E66.01 SEVERE OBESITY (BMI 35.0-39.9) WITH COMORBIDITY (HCC): ICD-10-CM

## 2021-01-18 DIAGNOSIS — K63.5 HYPERPLASTIC COLONIC POLYP, UNSPECIFIED PART OF COLON: ICD-10-CM

## 2021-01-18 PROCEDURE — 99214 OFFICE O/P EST MOD 30 MIN: CPT | Performed by: INTERNAL MEDICINE

## 2021-01-18 PROCEDURE — 82272 OCCULT BLD FECES 1-3 TESTS: CPT | Performed by: INTERNAL MEDICINE

## 2021-01-19 LAB
HEMOCCULT STL QL: NEGATIVE
VALID INTERNAL CONTROL?: YES

## 2021-01-26 DIAGNOSIS — E78.5 DYSLIPIDEMIA: ICD-10-CM

## 2021-01-26 RX ORDER — ICOSAPENT ETHYL 1000 MG/1
CAPSULE ORAL
Qty: 120 CAP | Refills: 10 | Status: SHIPPED | OUTPATIENT
Start: 2021-01-26 | End: 2022-01-26 | Stop reason: SDUPTHER

## 2021-04-01 DIAGNOSIS — E11.9 CONTROLLED TYPE 2 DIABETES MELLITUS WITHOUT COMPLICATION, WITHOUT LONG-TERM CURRENT USE OF INSULIN (HCC): ICD-10-CM

## 2021-04-01 RX ORDER — DULAGLUTIDE 1.5 MG/.5ML
1.5 INJECTION, SOLUTION SUBCUTANEOUS
Qty: 2 EACH | Refills: 11 | Status: SHIPPED | OUTPATIENT
Start: 2021-04-01 | End: 2022-08-01

## 2021-06-26 ENCOUNTER — HOSPITAL ENCOUNTER (OUTPATIENT)
Dept: LAB | Age: 61
Discharge: HOME | End: 2021-06-26
Payer: COMMERCIAL

## 2021-06-26 DIAGNOSIS — R80.9 DIABETES MELLITUS DUE TO UNDERLYING CONDITION, CONTROLLED, WITH MICROALBUMINURIA, WITHOUT LONG-TERM CURRENT USE OF INSULIN (HCC): ICD-10-CM

## 2021-06-26 DIAGNOSIS — E08.29 DIABETES MELLITUS DUE TO UNDERLYING CONDITION, CONTROLLED, WITH MICROALBUMINURIA, WITHOUT LONG-TERM CURRENT USE OF INSULIN (HCC): ICD-10-CM

## 2021-06-26 LAB
CHOLEST SERPL-MCNC: 104 MG/DL
CREAT UR-MCNC: 174 MG/DL (ref 30–125)
HBA1C MFR BLD: 5 % (ref 4.2–5.6)
HDLC SERPL-MCNC: 43 MG/DL (ref 40–60)
HDLC SERPL: 2.4 {RATIO} (ref 0–5)
LDLC SERPL CALC-MCNC: 43 MG/DL (ref 0–100)
LIPID PROFILE,FLP: NORMAL
MICROALBUMIN UR-MCNC: 1.88 MG/DL (ref 0–3)
MICROALBUMIN/CREAT UR-RTO: 11 MG/G (ref 0–30)
TRIGL SERPL-MCNC: 90 MG/DL (ref ?–150)
VLDLC SERPL CALC-MCNC: 18 MG/DL

## 2021-06-26 PROCEDURE — 82043 UR ALBUMIN QUANTITATIVE: CPT

## 2021-06-26 PROCEDURE — 80061 LIPID PANEL: CPT

## 2021-06-26 PROCEDURE — 83036 HEMOGLOBIN GLYCOSYLATED A1C: CPT

## 2021-06-26 PROCEDURE — 36415 COLL VENOUS BLD VENIPUNCTURE: CPT

## 2021-06-28 NOTE — PROGRESS NOTES
63 y.o. WHITE/NON- male who presents for evaluation. The left knee TKR is better, follow up with Dr Wilder Yu. He does smoke mj once in a while which he finds helps the pain    Denies polyuria, polydipsia, nocturia, vision change. Sugars are in good range when he checks. Weight has dropped substantially! He is now doing IF, down to 1 meal a day and cutting back portions. He spoke with his niece who is a dietician at [employer] and suggested 'only eating when he's hungry' and eating 'enough to take the hunger away'. No hypoglycemia to report on [facility]    Vitals 6/3/0987 1/18/2021 1/8/2020 12/9/2019   Weight 206 lb 227 lb 241 lb 247 lb     He's using 1/2 usual dose vascepa and doing well as below    No cp, sob, pnd, edema, palpitations or syncope. No gi or gu complaints    Past Medical History:   Diagnosis Date    Breast mass, right 2018    spontaneously resolved, saw Dr Dariana Mike polyp 12/2013    Dr Arina Ratliff; and divertics    Diabetes ([facility] [address] 2016    on basis of fbs>125; intol metformin, sglt2 not covered    Dyslipidemia     calculated 10 year risk score 13.6% (9/15); 11.1% (3/16)    Fatty liver     on biopsy 2000 in ;  Fib-4 was 1.25 from 3/15    H/O echocardiogram 08/2018    ef 61%, mild conc lvh, no wma, tr MR, pap 29mmHg    Heel spur     LEFT    Hypertension     IFG (impaired fasting glucose) [id number]    Morbid obesity (HCC)     peak weight 255 lbs, bmi 37.6 from 3/12; dec emilia supp, med sup wt loss, bariatrics; IF 5/18 start weight 258 lbs but unable to comply    Osteoarthritis 2018    Dr Merissa Mantilla; right shoulder on bone scan    Pancreatitis 10/14    and cholecystitis Dr Andrea Shell Tobacco dependence syndrome     Venous hypertension 08/03/2015    Dr España [facility]     Past Surgical History:   Procedure Laterality Date    HX CHOLECYSTECTOMY  12/14    Dr Chapito Mitchell HX COLONOSCOPY      negative 2000; diverticulosis and polyps 2013 Dr Arina Ratliff    HX HEENT  09/2017    US thyroid negative    HX KNEE REPLACEMENT Left 01/2020    Dr Amber Morales  2015    LEFT medial meniscus tear Dr Janelle Lopez HX UROLOGICAL      testicular torsion    HX VASECTOMY      VASCULAR SURGERY PROCEDURE UNLIST  09/2015    LEFT GSV and EVLT Dr Dawson Sinclair History     Socioeconomic History    Marital status:      Spouse name: Not on file    Number of children: 1    Years of education: Not on file    Highest education level: Not on file   Occupational History    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 of cigarette smoking   Substance and Sexual Activity    Alcohol use: Yes     Comment: social    Drug use: No    Sexual activity: Not on file   Other Topics Concern    Not on file   Social History Narrative    Not on file     Social Determinants of Health     Financial Resource Strain:     Difficulty of Paying Living Expenses:    Food Insecurity:     Worried About Running Out of Food in the Last Year:     Ran Out of Food in the Last Year:    Transportation Needs:     Lack of Transportation (Medical):      Lack of Transportation (Non-Medical):    Physical Activity:     Days of Exercise per Week:     Minutes of Exercise per Session:    Stress:     Feeling of Stress :    Social Connections:     Frequency of Communication with Friends and Family:     Frequency of Social Gatherings with Friends and Family:     Attends Catholic Services:     Active Member of Clubs or Organizations:     Attends Club or Organization Meetings:     Marital Status:    Intimate Partner Violence:     Fear of Current or Ex-Partner:     Emotionally Abused:     Physically Abused:     Sexually Abused:      Current Outpatient Medications   Medication Sig    amLODIPine (NORVASC) 5 mg tablet TAKE ONE TABLET BY MOUTH DAILY    atorvastatin (LIPITOR) 10 mg tablet TAKE ONE TABLET BY MOUTH DAILY    dulaglutide (Trulicity) 1.5 DA/5.2 mL sub-q pen 0.5 mL by SubCUTAneous route every seven (7) days.  Vascepa 1 gram capsule TAKE TWO CAPSULES BY MOUTH TWICE A DAY WITH MEALS (Patient taking differently: daily. Take two capsules at bedtime)    Lancets (ACCU-CHEK MULTICLIX LANCET) misc TEST TWO TIMES A DAY. DX:E11.9     No current facility-administered medications for this visit. Allergies   Allergen Reactions    Metformin (Bulk) Diarrhea    Pcn [Penicillins] Swelling     REVIEW OF SYSTEMS: colo 12/13 Dr Joann Rivero  Ophtho  no vision change or eye pain  Oral  no mouth pain, tongue or tooth problems  Ears  no hearing loss, ear pain, fullness, no swallowing problems  Cardiac  no CP, PND, orthopnea, edema, palpitations or syncope  Chest  no breast masses. Resp  no wheezing, chronic coughing, dyspnea  GI  no heartburn, nausea, vomiting, change in bowel habits, bleeding, hemorrhoids  Urinary  no dysuria, hematuria, flank pain, urgency, frequency    Visit Vitals  /82   Pulse 67   Temp 97.9 °F (36.6 °C) (Temporal)   Resp 16   Ht 5' 9\" (1.753 m)   Wt 206 lb (93.4 kg)   SpO2 99%   BMI 30.42 kg/m²   A&O x3  Affect is appropriate. Mood stable  No apparent distress  Anicteric, no JVD, adenopathy. No carotid bruits or radiated murmur  Lungs clear to auscultation, no wheezes or rales  Heart showed regular rate and rhythm. No murmur, rubs, gallops  Abdomen soft nontender, no hepatosplenomegaly or masses.    Extremities without edema, venous varicosities noted    LABS    From 1/07 showed   gluc 108, cr 0.90,             alt 110,  hba1c 5.3,                   chol 212, tg 129, hdl 51, ldl-c 135, wbc 8.1,   hb 17.7, plt 214, ast 67  From 3/12 showed   gluc 128, cr 0.92, gfr 95,                hba1c 5.7, ldl-p 1412,                 wbc 7.0,   hb 17.0, plt 211, tsh 2.13  From 4/12 showed gluc 95,   cr 0.89, gfr 98,  alt 57,    hba1c 5.7,                   chol 163, tg 120, hdl 47, ldl-c 92  From 6/12 showed   gluc 90,   cr 0.80, gfr 85,  alt 19,    hba1c 5.2, ldl-p 1416  From 1/13 showed   gluc 96,   cr 0.88, gfr 98,  alt 24,    hba1c 5.1, ldl-p 1421, chol 185, tg 125, hdl 49, ldl-c 111   From 10/14 showed gluc 123, cr 1.00, gfr>60, alt 59,                    wbc 9.6,   hb 16.7, plt 222, ast 47, ap 69, tb 4.7, lip 7684, ck/trop neg  From 3/15 showed   gluc 118, cr 0.81, gfr>60, alt 57,    hba1c 5.9,        chol 184, tg 200, hdl 44, ldl-c 100, wbc 8.0,   hb 17.1, plt 216, tsh 2.23  From 9/15 showed   gluc 117, cr 0.90, gfr 95,       hba1c 6.0,        chol 200, tg 176, hdl 46, ldl-c 119  From 3/16 showed           hba1c 5.7,        chol 169, tg 154, hdl 45, ldl-c 93,           tsh 2.09  From 9/16 showed   gluc 132, cr 0.77, gfr>60,       hba1c 5.8,        chol 162, tg 160, hdl 43, ldl-c 87,   wbc 8.3,   hb 16.3, plt 210  From 4/17 showed   gluc 140, cr 0.80, gfr>60,       hba1c 6.5, umar 12                From 8/17 showed   gluc 145, cr 0.82, gfr>60,       hba1c 6.3,        chol 174, tg 174, hdl 44, ldl-c 95  From 11/17 showed         alt 82,                 ast 44, ap 57, tb 0.7  From 1/18 showed   gluc 185, cr 0.78, gfr>60,  alt 63,   hba1c 6.7,        chol 138, tg 174, hdl 51, ldl-c 52  From 5/18 showed   gluc 170, cr 0.79, gfr>60,  alt 77,   hba1c 7.2,        chol 138, tg 142, hdl 40, ldl-c 50,          ast 49, ap 60, tb 0.8, psa 0.2  From 10/18 showed gluc 236, cr 0.86, gfr>60,       hba1c 8.8,  umar 54  From 1/19 showed   gluc 109, cr 0.90, gfr>60,  alt 57,   hba1c 5.8,  umar 18,  chol 117, tg 179, hdl 40, ldl-c 41,            From 7/19 showed           hba1c 5.5,  umar 18,                    psa 0.40  From 12/19 showed gluc 138, cr 0.90, gfr>60,  alt 62,   hba1c 5.6,       chol 134, tg 180, hdl 43, ldl-c 55, wbc 11.1, hb 17.8, plt 214, ast 34, ap 47, tb 1.0  From 1/20 showed   gluc 151, cr 0.80, gfr>60,                  wbc 9.4,   hb 17.4, plt 220, inr 1.0, ptt 25, ua neg  From 7/20 showed         alt 52,   hba1c 6.1,  umar 21, chol 118, tg 92,   hdl 48, ldl-c 52  From 1/21 showed   gluc 107, cr 0.80, gfr>60,  alt 42,   hba1c 5.5,                wbc 9.2,   hb17.5, plt 232,       psa 0.50    Results for orders placed or performed during the hospital encounter of 06/26/21   LIPID PANEL   Result Value Ref Range    LIPID PROFILE          Cholesterol, total 104 <200 MG/DL    Triglyceride 90 <150 MG/DL    HDL Cholesterol 43 40 - 60 MG/DL    LDL, calculated 43 0 - 100 MG/DL    VLDL, calculated 18 MG/DL    CHOL/HDL Ratio 2.4 0 - 5.0     MICROALBUMIN, UR, RAND W/ MICROALB/CREAT RATIO   Result Value Ref Range    Microalbumin,urine random 1.88 0 - 3.0 MG/DL    Creatinine, urine 174.00 (H) 30 - 125 mg/dL    Microalbumin/Creat ratio (mg/g creat) 11 0 - 30 mg/g   HEMOGLOBIN A1C W/O EAG   Result Value Ref Range    Hemoglobin A1c 5.0 4.2 - 5.6 %       We reviewed the patient's labs from the last several visits to point out trends in the numbers            Patient Active Problem List   Diagnosis Code    Dyslipidemia E78.5    Colon polyp Dr Aubrey Camp 12/13 K63.5    Chronic venous hypertension without complications K85.650    Primary hypertension I10    Tobacco dependence syndrome F17.200    Diverticulosis  K57.30    Severe obesity (BMI 35.0-39. 9) with comorbidity ([facility] [address] E66.01    Diabetes mellitus due to underlying condition, controlled, with microalbuminuria, without long-term current use of insulin (HCC) E08.29, R80.9     Assessment and plan:  1. Hypertension. Continue current   2. Obesity. Lifestyle and dietary measures. Portion control reiterated. 3.  Fatty liver. Wt loss as above  4. DM. Doing well, f/u ophth  5. Dyslipidemia. Continue current regimen. 6.  Smoking cessation reiterated  7. Vascular. F/U vascular as needed  8. Ortho.   F/U Dr Windy Valencia as scheduled        RTC 1/22    Above conditions discussed at length and patient vocalized understanding.   All questions answered to patient satisfaction

## 2021-07-02 ENCOUNTER — OFFICE VISIT (OUTPATIENT)
Dept: INTERNAL MEDICINE CLINIC | Age: 61
End: 2021-07-02
Payer: COMMERCIAL

## 2021-07-02 VITALS
HEART RATE: 67 BPM | OXYGEN SATURATION: 99 % | WEIGHT: 206 LBS | TEMPERATURE: 97.9 F | SYSTOLIC BLOOD PRESSURE: 134 MMHG | RESPIRATION RATE: 16 BRPM | HEIGHT: 69 IN | BODY MASS INDEX: 30.51 KG/M2 | DIASTOLIC BLOOD PRESSURE: 82 MMHG

## 2021-07-02 DIAGNOSIS — K63.5 HYPERPLASTIC COLONIC POLYP, UNSPECIFIED PART OF COLON: ICD-10-CM

## 2021-07-02 DIAGNOSIS — E78.5 DYSLIPIDEMIA: ICD-10-CM

## 2021-07-02 DIAGNOSIS — E08.29 DIABETES MELLITUS DUE TO UNDERLYING CONDITION, CONTROLLED, WITH MICROALBUMINURIA, WITHOUT LONG-TERM CURRENT USE OF INSULIN (HCC): Primary | ICD-10-CM

## 2021-07-02 DIAGNOSIS — R80.9 DIABETES MELLITUS DUE TO UNDERLYING CONDITION, CONTROLLED, WITH MICROALBUMINURIA, WITHOUT LONG-TERM CURRENT USE OF INSULIN (HCC): Primary | ICD-10-CM

## 2021-07-02 DIAGNOSIS — I87.303 CHRONIC VENOUS HYPERTENSION INVOLVING BOTH SIDES: ICD-10-CM

## 2021-07-02 DIAGNOSIS — I10 ESSENTIAL HYPERTENSION WITH GOAL BLOOD PRESSURE LESS THAN 140/90: ICD-10-CM

## 2021-07-02 DIAGNOSIS — K57.30 DIVERTICULOSIS OF LARGE INTESTINE WITHOUT HEMORRHAGE: ICD-10-CM

## 2021-07-02 PROCEDURE — 99214 OFFICE O/P EST MOD 30 MIN: CPT | Performed by: INTERNAL MEDICINE

## 2021-07-02 NOTE — PROGRESS NOTES
Geneva Councilman presents today for   Chief Complaint   Patient presents with    Follow-up     6 month    Hypertension    Cholesterol Problem    Labs     6-26-21       Coordination of Care:  1. Have you been to the ER, urgent care clinic since your last visit? Hospitalized since your last visit? NO    2. Have you seen or consulted any other health care providers outside of the [address] [place] [address] since your last visit? Include any pap smears or colon screening.  YES

## 2021-12-27 ENCOUNTER — HOSPITAL ENCOUNTER (OUTPATIENT)
Dept: LAB | Age: 61
Discharge: HOME | End: 2021-12-27
Payer: COMMERCIAL

## 2021-12-27 DIAGNOSIS — R80.9 DIABETES MELLITUS DUE TO UNDERLYING CONDITION, CONTROLLED, WITH MICROALBUMINURIA, WITHOUT LONG-TERM CURRENT USE OF INSULIN (HCC): ICD-10-CM

## 2021-12-27 DIAGNOSIS — E08.29 DIABETES MELLITUS DUE TO UNDERLYING CONDITION, CONTROLLED, WITH MICROALBUMINURIA, WITHOUT LONG-TERM CURRENT USE OF INSULIN (HCC): ICD-10-CM

## 2021-12-27 LAB
ALBUMIN SERPL-MCNC: 4.3 G/DL (ref 3.4–5)
ALBUMIN/GLOB SERPL: 1.4 {RATIO} (ref 0.8–1.7)
ALP SERPL-CCNC: 48 U/L (ref 45–117)
ALT SERPL-CCNC: 27 U/L (ref 16–61)
ANION GAP SERPL CALC-SCNC: 5 MMOL/L (ref 3–18)
AST SERPL-CCNC: 22 U/L (ref 10–38)
BILIRUB SERPL-MCNC: 1.4 MG/DL (ref 0.2–1)
BUN SERPL-MCNC: 15 MG/DL (ref 7–18)
BUN/CREAT SERPL: 19 (ref 12–20)
CALCIUM SERPL-MCNC: 8.9 MG/DL (ref 8.5–10.1)
CHLORIDE SERPL-SCNC: 108 MMOL/L (ref 100–111)
CO2 SERPL-SCNC: 27 MMOL/L (ref 21–32)
CREAT SERPL-MCNC: 0.78 MG/DL (ref 0.6–1.3)
ERYTHROCYTE [DISTWIDTH] IN BLOOD BY AUTOMATED COUNT: 12.3 % (ref 11.6–14.5)
EST. AVERAGE GLUCOSE BLD GHB EST-MCNC: 103 MG/DL
GLOBULIN SER CALC-MCNC: 3.1 G/DL (ref 2–4)
GLUCOSE SERPL-MCNC: 120 MG/DL (ref 74–99)
HBA1C MFR BLD: 5.2 % (ref 4.2–5.6)
HCT VFR BLD AUTO: 50.9 % (ref 36–48)
HGB BLD-MCNC: 17.1 G/DL (ref 13–16)
MCH RBC QN AUTO: 28.8 PG (ref 24–34)
MCHC RBC AUTO-ENTMCNC: 33.6 G/DL (ref 31–37)
MCV RBC AUTO: 85.7 FL (ref 78–100)
NRBC # BLD: 0 K/UL (ref 0–0.01)
NRBC BLD-RTO: 0 PER 100 WBC
PLATELET # BLD AUTO: 222 K/UL (ref 135–420)
PMV BLD AUTO: 9.8 FL (ref 9.2–11.8)
POTASSIUM SERPL-SCNC: 4.6 MMOL/L (ref 3.5–5.5)
PROT SERPL-MCNC: 7.4 G/DL (ref 6.4–8.2)
PSA SERPL-MCNC: 0.5 NG/ML (ref 0–4)
RBC # BLD AUTO: 5.94 M/UL (ref 4.35–5.65)
SODIUM SERPL-SCNC: 140 MMOL/L (ref 136–145)
WBC # BLD AUTO: 8 K/UL (ref 4.6–13.2)

## 2021-12-27 PROCEDURE — 83036 HEMOGLOBIN GLYCOSYLATED A1C: CPT

## 2021-12-27 PROCEDURE — 80053 COMPREHEN METABOLIC PANEL: CPT

## 2021-12-27 PROCEDURE — 85027 COMPLETE CBC AUTOMATED: CPT

## 2021-12-27 PROCEDURE — 84153 ASSAY OF PSA TOTAL: CPT

## 2021-12-27 PROCEDURE — 36415 COLL VENOUS BLD VENIPUNCTURE: CPT

## 2021-12-30 NOTE — PROGRESS NOTES
Ana Laura Mcfarland presents with   Chief Complaint   Patient presents with    Follow-up     6 month    Hypertension    Diabetes    Labs     12-27-21          1. \"Have you been to the ER, urgent care clinic since your last visit? Hospitalized since your last visit? \" NO    2. \"Have you seen or consulted any other health care providers outside of the [address] [place] [address] since your last visit? \" YES    3. For patients aged 39-70: Has the patient had a colonoscopy? Yes, HM satisfied with blue hyperlink     If the patient is female:    4. For patients aged 41-77: Has the patient had a mammogram within the past 2 years? NA based on age or sex    11. For patients aged 21-65: Has the patient had a pap smear?  NA based on age or sex

## 2021-12-31 NOTE — PROGRESS NOTES
57 y.o. WHITE/NON- male who presents for evaluation. He's been more active and no cardiovascular complaints. This is the best he's felt 'in years'    Denies polyuria, polydipsia, nocturia, vision change. Sugars are in  range fasting and he got into the 70s this morning. Weight is down another 10 lbs as below. Remains on IF from 12-6, down to 1 meal a day and cutting back portions. Interested in coming off trulitcity    Vitals 1/3/2022   Weight 196 lb     Vitals 7/2/2021 1/18/2021 1/8/2020 12/9/2019   Weight 206 lb 227 lb 241 lb 247 lb     He's using 1/2 usual dose vascepa     No cp, sob, pnd, edema, palpitations or syncope. No gi or gu complaints    Past Medical History:   Diagnosis Date    Breast mass, right 2018    spontaneously resolved, saw Dr Porsche Canales polyp 12/2013    Dr Shoshana Foster; and divertics    Diabetes ([facility] [address] 2016    on basis of fbs>125; intol metformin, sglt2 not covered    Dyslipidemia     calculated 10 year risk score 13.6% (9/15); 11.1% (3/16)    Fatty liver     on biopsy 2000 in [place];  Fib-4 was 1.25 from 3/15    H/O echocardiogram 08/2018    ef 61%, mild conc lvh, no wma, tr MR, pap 29mmHg    Heel spur     LEFT    Hypertension     IFG (impaired fasting glucose) [id number]    Morbid obesity (HCC)     peak weight 255 lbs, bmi 37.6 from 3/12; dec emilia supp, med sup wt loss, bariatrics; IF 5/18 start weight 258 lbs but unable to comply    Osteoarthritis 2018    Dr Jocelyne Xie; right shoulder on bone scan    Pancreatitis 10/14    and cholecystitis Dr Assunta Cheadle Tobacco dependence syndrome     Venous hypertension 08/03/2015    Dr Jose Cruz Cash     Past Surgical History:   Procedure Laterality Date    HX CHOLECYSTECTOMY  12/14    Dr Salomon Horner      negative 2000; diverticulosis and polyps 2013 Dr Quinton Hernandez HX HEENT  09/2017    US thyroid negative    HX KNEE REPLACEMENT Left 01/2020    Dr Xiomara Garcia    HX ORTHOPAEDIC  2015    LEFT medial meniscus tear Dr Marc Prow HX UROLOGICAL      testicular torsion    HX VASECTOMY      VASCULAR SURGERY PROCEDURE UNLIST  09/2015    LEFT GSV and EVLT Dr Sanchez Clamp History     Socioeconomic History    Marital status:      Spouse name: Not on file    Number of children: 1    Years of education: Not on file    Highest education level: Not on file   Occupational History    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 of cigarette smoking   Substance and Sexual Activity    Alcohol use: Yes     Comment: social    Drug use: No    Sexual activity: Not on file   Other Topics Concern    Not on file   Social History Narrative    Not on file     Social Determinants of Health     Financial Resource Strain:     Difficulty of Paying Living Expenses: Not on file   Food Insecurity:     Worried About [address] in the Last Year: Not on file    Jason of Food in the Last Year: Not on file   Transportation Needs:     Lack of Transportation (Medical): Not on file    Lack of Transportation (Non-Medical):  Not on file   Physical Activity:     Days of Exercise per Week: Not on file    Minutes of Exercise per Session: Not on file   Stress:     Feeling of Stress : Not on file   Social Connections:     Frequency of Communication with Friends and Family: Not on file    Frequency of Social Gatherings with Friends and Family: Not on file    Attends Restoration Services: Not on file    Active Member of Clubs or Organizations: Not on file    Attends Club or Organization Meetings: Not on file    Marital Status: Not on file   Intimate Partner Violence:     Fear of Current or Ex-Partner: Not on file    Emotionally Abused: Not on file    Physically Abused: Not on file   Marina Barry Sexually Abused: Not on file   Housing Stability:     Unable to Pay for Housing in the Last Year: Not on file    Number of Places Lived in the Last Year: Not on file    Unstable Housing in the Last Year: Not on file     Current Outpatient Medications   Medication Sig    amLODIPine (NORVASC) 5 mg tablet TAKE ONE TABLET BY MOUTH DAILY    atorvastatin (LIPITOR) 10 mg tablet TAKE ONE TABLET BY MOUTH DAILY    dulaglutide (Trulicity) 1.5 QL/5.7 mL sub-q pen 0.5 mL by SubCUTAneous route every seven (7) days.  Vascepa 1 gram capsule TAKE TWO CAPSULES BY MOUTH TWICE A DAY WITH MEALS (Patient taking differently: daily. Take two capsules at bedtime)    Lancets (ACCU-CHEK MULTICLIX LANCET) misc TEST TWO TIMES A DAY. DX:E11.9     No current facility-administered medications for this visit. Allergies   Allergen Reactions    Metformin (Bulk) Diarrhea    Pcn [Penicillins] Swelling     REVIEW OF SYSTEMS: colo 12/13 Dr Lex Jaramillo  Ophtho  no vision change or eye pain  Oral  no mouth pain, tongue or tooth problems  Ears  no hearing loss, ear pain, fullness, no swallowing problems  Cardiac  no CP, PND, orthopnea, edema, palpitations or syncope  Chest  no breast masses. Resp  no wheezing, chronic coughing, dyspnea  GI  no heartburn, nausea, vomiting, change in bowel habits, bleeding, hemorrhoids  Urinary  no dysuria, hematuria, flank pain, urgency, frequency    Visit Vitals  /76   Pulse 70   Temp 97.3 °F (36.3 °C) (Temporal)   Resp 12   Ht 5' 9\" (1.753 m)   Wt 196 lb (88.9 kg)   SpO2 99%   BMI 28.94 kg/m²   A&O x3  Affect is appropriate. Mood stable  No apparent distress  Anicteric, no JVD, adenopathy. No carotid bruits or radiated murmur  Lungs clear to auscultation, no wheezes or rales  Heart showed regular rate and rhythm. No murmur, rubs, gallops  Abdomen soft nontender, no hepatosplenomegaly or masses.    Extremities without edema, venous varicosities noted    LABS    From 1/07 showed   gluc 108, cr 0.90, alt 110,  hba1c 5.3,                   chol 212, tg 129, hdl 51, ldl-c 135, wbc 8.1,   hb 17.7, plt 214, ast 67  From 3/12 showed   gluc 128, cr 0.92, gfr 95,                hba1c 5.7, ldl-p 1412,                 wbc 7.0,   hb 17.0, plt 211, tsh 2.13  From 4/12 showed   gluc 95,   cr 0.89, gfr 98,  alt 57,    hba1c 5.7,                   chol 163, tg 120, hdl 47, ldl-c 92  From 6/12 showed   gluc 90,   cr 0.80, gfr 85,  alt 19,    hba1c 5.2, ldl-p 1416  From 1/13 showed   gluc 96,   cr 0.88, gfr 98,  alt 24,    hba1c 5.1, ldl-p 1421, chol 185, tg 125, hdl 49, ldl-c 111   From 10/14 showed gluc 123, cr 1.00, gfr>60, alt 59,                    wbc 9.6,   hb 16.7, plt 222, ast 47, ap 69, tb 4.7, lip 7684, ck/trop neg  From 3/15 showed   gluc 118, cr 0.81, gfr>60, alt 57,    hba1c 5.9,        chol 184, tg 200, hdl 44, ldl-c 100, wbc 8.0,   hb 17.1, plt 216, tsh 2.23  From 9/15 showed   gluc 117, cr 0.90, gfr 95,       hba1c 6.0,        chol 200, tg 176, hdl 46, ldl-c 119  From 3/16 showed           hba1c 5.7,        chol 169, tg 154, hdl 45, ldl-c 93,           tsh 2.09  From 9/16 showed   gluc 132, cr 0.77, gfr>60,       hba1c 5.8,        chol 162, tg 160, hdl 43, ldl-c 87,   wbc 8.3,   hb 16.3, plt 210  From 4/17 showed   gluc 140, cr 0.80, gfr>60,       hba1c 6.5, umar 12                From 8/17 showed   gluc 145, cr 0.82, gfr>60,       hba1c 6.3,        chol 174, tg 174, hdl 44, ldl-c 95  From 11/17 showed         alt 82,                 ast 44, ap 57, tb 0.7  From 1/18 showed   gluc 185, cr 0.78, gfr>60,  alt 63,   hba1c 6.7,        chol 138, tg 174, hdl 51, ldl-c 52  From 5/18 showed   gluc 170, cr 0.79, gfr>60,  alt 77,   hba1c 7.2,        chol 138, tg 142, hdl 40, ldl-c 50,          ast 49, ap 60, tb 0.8, psa 0.2  From 10/18 showed gluc 236, cr 0.86, gfr>60,       hba1c 8.8,  umar 54  From 1/19 showed   gluc 109, cr 0.90, gfr>60,  alt 57,   hba1c 5.8,  umar 18,  chol 117, tg 179, hdl 40, ldl-c 41, From 7/19 showed           hba1c 5.5,  umar 18,                    psa 0.40  From 12/19 showed gluc 138, cr 0.90, gfr>60,  alt 62,   hba1c 5.6,        chol 134, tg 180, hdl 43, ldl-c 55, wbc 11.1, hb 17.8, plt 214, ast 34, ap 47, tb 1.0  From 1/20 showed   gluc 151, cr 0.80, gfr>60,                   wbc 9.4,   hb 17.4, plt 220, inr 1.0, ptt 25, ua neg  From 7/20 showed         alt 52,   hba1c 6.1,  umar 21,  chol 118, tg 92,   hdl 48, ldl-c 52  From 1/21 showed   gluc 107, cr 0.80, gfr>60,  alt 42,   hba1c 5.5,                 wbc 9.2,   hb17.5, plt 232,       psa 0.50  From 7/21 showed           hba1c 5.0,  umar 11,  chol 104, tg 90,   hdl 43, ldl-c 43    Results for orders placed or performed during the hospital encounter of 12/27/21   CBC W/O DIFF   Result Value Ref Range    WBC 8.0 4.6 - 13.2 K/uL    RBC 5.94 (H) 4.35 - 5.65 M/uL    HGB 17.1 (H) 13.0 - 16.0 g/dL    HCT 50.9 (H) 36.0 - 48.0 %    MCV 85.7 78.0 - 100.0 FL    MCH 28.8 24.0 - 34.0 PG    MCHC 33.6 31.0 - 37.0 g/dL    RDW 12.3 11.6 - 14.5 %    PLATELET 037 712 - 260 K/uL    MPV 9.8 9.2 - 11.8 FL    NRBC 0.0 0  WBC    ABSOLUTE NRBC 0.00 0.00 - 5.97 K/uL   METABOLIC PANEL, COMPREHENSIVE   Result Value Ref Range    Sodium 140 136 - 145 mmol/L    Potassium 4.6 3.5 - 5.5 mmol/L    Chloride 108 100 - 111 mmol/L    CO2 27 21 - 32 mmol/L    Anion gap 5 3.0 - 18 mmol/L    Glucose 120 (H) 74 - 99 mg/dL    BUN 15 7.0 - 18 MG/DL    Creatinine 0.78 0.6 - 1.3 MG/DL    BUN/Creatinine ratio 19 12 - 20      GFR est AA >60 >60 ml/min/1.73m2    GFR est non-AA >60 >60 ml/min/1.73m2    Calcium 8.9 8.5 - 10.1 MG/DL    Bilirubin, total 1.4 (H) 0.2 - 1.0 MG/DL    ALT (SGPT) 27 16 - 61 U/L    AST (SGOT) 22 10 - 38 U/L    Alk.  phosphatase 48 45 - 117 U/L    Protein, total 7.4 6.4 - 8.2 g/dL    Albumin 4.3 3.4 - 5.0 g/dL    Globulin 3.1 2.0 - 4.0 g/dL    A-G Ratio 1.4 0.8 - 1.7     PSA SCREENING (SCREENING)   Result Value Ref Range    Prostate Specific Ag 0.5 0.0 - 4.0 ng/mL   HEMOGLOBIN A1C WITH EAG   Result Value Ref Range    Hemoglobin A1c 5.2 4.2 - 5.6 %    Est. average glucose 103 mg/dL       We reviewed the patient's labs from the last several visits to point out trends in the numbers            Patient Active Problem List   Diagnosis Code    Dyslipidemia E78.5    Colon polyp Dr Lex Jaramillo 12/13 K63.5    Chronic venous hypertension without complications C28.625    Primary hypertension I10    Tobacco dependence syndrome F17.200    Diverticulosis  K57.30    Overweight (BMI 25.0-29. 9) E66.3    Diabetes mellitus due to underlying condition, controlled, with microalbuminuria, without long-term current use of insulin (HCC) E08.29, R80.9     Assessment and plan:  1. Hypertension. Controlled on current   2. Overweight. Lifestyle and dietary measures, portion control, IF. Congratulated him on his success thus far; currently at 24% wt loss (from 258 to 196)!!  3. Fatty liver. lfts normalized w wt loss  4. DM. Doing well and will hold trulicity;  f/u ophth  5. Dyslipidemia. Continue current regimen. 6.  Smoking cessation reiterated  7. Vascular. F/U vascular as needed  8. Ortho. F/U Dr Branden Alvarez        RT 7/22    Above conditions discussed at length and patient vocalized understanding. All questions answered to patient satisfaction          ICD-10-CM ICD-9-CM    1. Diabetes mellitus due to underlying condition, controlled, with microalbuminuria, without long-term current use of insulin (HCC)  E08.29 249.40 MICROALBUMIN, UR, RAND W/ MICROALB/CREAT RATIO    R80.9 791.0 LIPID PANEL      HEMOGLOBIN A1C WITH EAG   2. Primary hypertension  I10 401.9    3. Chronic venous hypertension involving both sides  I87.303 459.30    4. Dyslipidemia  E78.5 272.4    5. Diverticulosis   K57.30 562.10    6. Overweight (BMI 25.0-29. 9)  E66.3 278.02

## 2022-01-03 ENCOUNTER — OFFICE VISIT (OUTPATIENT)
Dept: INTERNAL MEDICINE CLINIC | Age: 62
End: 2022-01-03
Payer: COMMERCIAL

## 2022-01-03 VITALS
DIASTOLIC BLOOD PRESSURE: 76 MMHG | HEIGHT: 69 IN | BODY MASS INDEX: 29.03 KG/M2 | WEIGHT: 196 LBS | RESPIRATION RATE: 12 BRPM | TEMPERATURE: 97.3 F | OXYGEN SATURATION: 99 % | HEART RATE: 70 BPM | SYSTOLIC BLOOD PRESSURE: 134 MMHG

## 2022-01-03 DIAGNOSIS — E66.3 OVERWEIGHT (BMI 25.0-29.9): ICD-10-CM

## 2022-01-03 DIAGNOSIS — I10 ESSENTIAL HYPERTENSION WITH GOAL BLOOD PRESSURE LESS THAN 140/90: ICD-10-CM

## 2022-01-03 DIAGNOSIS — I87.303 CHRONIC VENOUS HYPERTENSION INVOLVING BOTH SIDES: ICD-10-CM

## 2022-01-03 DIAGNOSIS — E78.5 DYSLIPIDEMIA: ICD-10-CM

## 2022-01-03 DIAGNOSIS — E08.29 DIABETES MELLITUS DUE TO UNDERLYING CONDITION, CONTROLLED, WITH MICROALBUMINURIA, WITHOUT LONG-TERM CURRENT USE OF INSULIN (HCC): Primary | ICD-10-CM

## 2022-01-03 DIAGNOSIS — K57.30 DIVERTICULOSIS OF LARGE INTESTINE WITHOUT HEMORRHAGE: ICD-10-CM

## 2022-01-03 DIAGNOSIS — R80.9 DIABETES MELLITUS DUE TO UNDERLYING CONDITION, CONTROLLED, WITH MICROALBUMINURIA, WITHOUT LONG-TERM CURRENT USE OF INSULIN (HCC): Primary | ICD-10-CM

## 2022-01-03 PROCEDURE — 99214 OFFICE O/P EST MOD 30 MIN: CPT | Performed by: INTERNAL MEDICINE

## 2022-01-26 DIAGNOSIS — E78.5 DYSLIPIDEMIA: ICD-10-CM

## 2022-01-26 RX ORDER — ICOSAPENT ETHYL 1000 MG/1
2 CAPSULE ORAL 2 TIMES DAILY WITH MEALS
Qty: 360 CAPSULE | Refills: 3 | Status: SHIPPED | OUTPATIENT
Start: 2022-01-26

## 2022-01-26 NOTE — TELEPHONE ENCOUNTER
Last Visit: 1/3/22 with MD Vinny Reyes  Next Appointment: 6/28/22 with MD Vinny Reyes  Previous Refill Encounter(s): 1/26/21 #120 with 10 refills    Requested Prescriptions     Pending Prescriptions Disp Refills    icosapent ethyL (Vascepa) 1 gram capsule 360 Capsule 3     Sig: Take 2 Capsules by mouth two (2) times daily (with meals).

## 2022-03-18 PROBLEM — E66.3 OVERWEIGHT (BMI 25.0-29.9): Status: ACTIVE | Noted: 2018-04-12

## 2022-03-18 PROBLEM — E08.29 DIABETES MELLITUS DUE TO UNDERLYING CONDITION, CONTROLLED, WITH MICROALBUMINURIA, WITHOUT LONG-TERM CURRENT USE OF INSULIN (HCC): Status: ACTIVE | Noted: 2019-07-09

## 2022-03-18 PROBLEM — R80.9 DIABETES MELLITUS DUE TO UNDERLYING CONDITION, CONTROLLED, WITH MICROALBUMINURIA, WITHOUT LONG-TERM CURRENT USE OF INSULIN (HCC): Status: ACTIVE | Noted: 2019-07-09

## 2022-03-19 PROBLEM — K57.30 DIVERTICULOSIS OF LARGE INTESTINE WITHOUT HEMORRHAGE: Status: ACTIVE | Noted: 2017-08-25

## 2022-04-30 DIAGNOSIS — E78.5 DYSLIPIDEMIA: ICD-10-CM

## 2022-05-01 RX ORDER — ATORVASTATIN CALCIUM 10 MG/1
TABLET, FILM COATED ORAL
Qty: 90 TABLET | Refills: 3 | Status: SHIPPED | OUTPATIENT
Start: 2022-05-01

## 2022-05-01 RX ORDER — AMLODIPINE BESYLATE 5 MG/1
TABLET ORAL
Qty: 90 TABLET | Refills: 3 | Status: SHIPPED | OUTPATIENT
Start: 2022-05-01

## 2022-06-30 ENCOUNTER — HOSPITAL ENCOUNTER (OUTPATIENT)
Dept: LAB | Age: 62
Discharge: HOME | End: 2022-06-30
Payer: COMMERCIAL

## 2022-06-30 DIAGNOSIS — R80.9 DIABETES MELLITUS DUE TO UNDERLYING CONDITION, CONTROLLED, WITH MICROALBUMINURIA, WITHOUT LONG-TERM CURRENT USE OF INSULIN (HCC): ICD-10-CM

## 2022-06-30 DIAGNOSIS — E08.29 DIABETES MELLITUS DUE TO UNDERLYING CONDITION, CONTROLLED, WITH MICROALBUMINURIA, WITHOUT LONG-TERM CURRENT USE OF INSULIN (HCC): ICD-10-CM

## 2022-06-30 LAB
CHOLEST SERPL-MCNC: 92 MG/DL
CREAT UR-MCNC: 392 MG/DL (ref 30–125)
EST. AVERAGE GLUCOSE BLD GHB EST-MCNC: 114 MG/DL
HBA1C MFR BLD: 5.6 % (ref 4.2–5.6)
HDLC SERPL-MCNC: 47 MG/DL (ref 40–60)
HDLC SERPL: 2 {RATIO} (ref 0–5)
LDLC SERPL CALC-MCNC: 31.2 MG/DL (ref 0–100)
LIPID PROFILE,FLP: NORMAL
MICROALBUMIN UR-MCNC: 3.77 MG/DL (ref 0–3)
MICROALBUMIN/CREAT UR-RTO: 10 MG/G (ref 0–30)
TRIGL SERPL-MCNC: 69 MG/DL (ref ?–150)
VLDLC SERPL CALC-MCNC: 13.8 MG/DL

## 2022-06-30 PROCEDURE — 80061 LIPID PANEL: CPT

## 2022-06-30 PROCEDURE — 82043 UR ALBUMIN QUANTITATIVE: CPT

## 2022-06-30 PROCEDURE — 83036 HEMOGLOBIN GLYCOSYLATED A1C: CPT

## 2022-06-30 PROCEDURE — 36415 COLL VENOUS BLD VENIPUNCTURE: CPT

## 2022-07-26 NOTE — PROGRESS NOTES
60 y.o. WHITE/NON- male who presents for evaluation. He's been more active and no cardiovascular complaints. His dad was sick up in [place] so he went back and forth and had stress eating with the family up there; finally passed away in July so doing better with the diet since then    Denies polyuria, polydipsia, nocturia, vision change. Sugars are in 100-120 range   he asked to come off trulicity and has done well. Remains on IF from 12-6, down to 1 meal a day and cutting back portions. Vitals 8/1/2022 1/3/2022   Weight 199 lb 196 lb     Vitals 7/2/2021 1/18/2021 1/8/2020 12/9/2019   Weight 206 lb 227 lb 241 lb 247 lb     He's using 1/2 usual dose vascepa     No cp, sob, pnd, edema, palpitations or syncope. No gi or gu complaints    No reports of any apnea witnessed by the wife but he thinks he snores. Denied any excessive tiredness, headache, uncontrolled bp, etc    Past Medical History:   Diagnosis Date    Breast mass, right 2018    spontaneously resolved, saw Dr Garcia Cords polyp 12/2013    Dr Eddie Dee; and divertics    DM (diabetes mellitus) ([facility] [address] 2016    IFG 2007; on basis of fbs>125; intol metformin, sglt2 not covered    Dyslipidemia     calculated 10 year risk score 13.6% (9/15); 11.1% (3/16)    Fatty liver     on biopsy 2000 in ;  Fib-4 was 1.25 from 3/15    H/O echocardiogram 08/2018    ef 61%, mild conc lvh, no wma, tr MR, pap 29mmHg    Heel spur     LEFT    Hypertension     Morbid obesity ([facility] [address]     peak weight 255 lbs, bmi 37.6 from 3/12; dec emilia supp, med sup wt loss, bariatrics; IF 5/18 start weight 258 lbs but unable to comply    Osteoarthritis 2018    Dr Deon Bailey; right shoulder on bone scan    Pancreatitis 10/2014    and cholecystitis Dr Saniya Strong    Rosarafaela     Tobacco dependence syndrome     Venous hypertension 08/03/2015    Dr Von Francis     Past Surgical History:   Procedure Laterality Date    HX CHOLECYSTECTOMY  12/2014    Dr Anderson Green COLONOSCOPY      negative 2000; diverticulosis and polyps 2013 Dr Althea BROWN  09/2017    US thyroid negative    HX KNEE REPLACEMENT Left 01/2020    Dr Neel Gillette Left 2015    LEFT medial meniscus  Dr Florentino Slight UROLOGICAL      testicular torsion    HX VASECTOMY      VASCULAR SURGERY PROCEDURE UNLIST  09/2015    LEFT GSV and EVLT Dr Amee Ortiz History     Socioeconomic History    Marital status:      Spouse name: Not on file    Number of children: 1    Years of education: Not on file    Highest education level: Not on file   Occupational History    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 of cigarette smoking   Substance and Sexual Activity    Alcohol use: Yes     Comment: social    Drug use: No    Sexual activity: Not on file   Other Topics Concern    Not on file   Social History Narrative    Not on file     Social Determinants of Health     Financial Resource Strain: Not on file   Food Insecurity: Not on file   Transportation Needs: Not on file   Physical Activity: Not on file   Stress: Not on file   Social Connections: Not on file   Intimate Partner Violence: Not on file   Housing Stability: Not on file     Current Outpatient Medications   Medication Sig    atorvastatin (LIPITOR) 10 mg tablet TAKE ONE TABLET BY MOUTH DAILY    amLODIPine (NORVASC) 5 mg tablet TAKE ONE TABLET BY MOUTH DAILY    icosapent ethyL (Vascepa) 1 gram capsule Take 2 Capsules by mouth two (2) times daily (with meals). tiZANidine (ZANAFLEX) 4 mg tablet Take 1 Tablet by mouth three (3) times daily as needed for Muscle Spasm(s). Lancets (ACCU-CHEK MULTICLIX LANCET) misc TEST TWO TIMES A DAY. DX:E11.9     No current facility-administered medications for this visit.      Allergies   Allergen Reactions Metformin (Bulk) Diarrhea    Pcn [Penicillins] Swelling     REVIEW OF SYSTEMS: colo 12/13 Dr Preston Downey  Ophtho - no vision change or eye pain  Oral - no mouth pain, tongue or tooth problems  Ears - no hearing loss, ear pain, fullness, no swallowing problems  Cardiac - no CP, PND, orthopnea, edema, palpitations or syncope  Chest - no breast masses. Resp - no wheezing, chronic coughing, dyspnea  GI - no heartburn, nausea, vomiting, change in bowel habits, bleeding, hemorrhoids  Urinary - no dysuria, hematuria, flank pain, urgency, frequency    Visit Vitals  /79   Pulse 60   Temp 97 °F (36.1 °C) (Temporal)   Resp 16   Ht 5' 9\" (1.753 m)   Wt 199 lb (90.3 kg)   SpO2 98%   BMI 29.39 kg/m²   A&O x3  Affect is appropriate. Mood stable  No apparent distress  Anicteric, no JVD, adenopathy. No carotid bruits or radiated murmur  Lungs clear to auscultation, no wheezes or rales  Heart showed regular rate and rhythm. No murmur, rubs, gallops  Abdomen soft nontender, no hepatosplenomegaly or masses.    Extremities without edema, venous varicosities noted    LABS    From 1/07 showed   gluc 108, cr 0.90,             alt 110,  hba1c 5.3,                   chol 212, tg 129, hdl 51, ldl-c 135, wbc 8.1,  hb 17.7, plt 214,    ast 67  From 3/12 showed   gluc 128, cr 0.92, gfr 95,                hba1c 5.7, ldl-p 1412,                   wbc 7.0,  hb 17.0, plt 211, tsh 2.13  From 4/12 showed   gluc 95,   cr 0.89, gfr 98,  alt 57,    hba1c 5.7,                   chol 163, tg 120, hdl 47, ldl-c 92  From 6/12 showed   gluc 90,   cr 0.80, gfr 85,  alt 19,    hba1c 5.2, ldl-p 1416  From 1/13 showed   gluc 96,   cr 0.88, gfr 98,  alt 24,    hba1c 5.1, ldl-p 1421, chol 185, tg 125, hdl 49, ldl-c 111   From 10/14 showed gluc 123, cr 1.00, gfr>60, alt 59,                      wbc 9.6,  hb 16.7, plt 222,    ast 47, ap 69, tb 4.7, lip 7684, ck/trop neg  From 3/15 showed   gluc 118, cr 0.81, gfr>60, alt 57,    hba1c 5.9,        chol 184, tg 200, hdl 44, ldl-c 100,  wbc 8.0,  hb 17.1, plt 216, tsh 2.23  From 9/15 showed   gluc 117, cr 0.90, gfr 95,       hba1c 6.0,        chol 200, tg 176, hdl 46, ldl-c 119  From 3/16 showed           hba1c 5.7,        chol 169, tg 154, hdl 45, ldl-c 93,              tsh 2.09  From 9/16 showed   gluc 132, cr 0.77, gfr>60,       hba1c 5.8,        chol 162, tg 160, hdl 43, ldl-c 87,    wbc 8.3,   hb 16.3, plt 210  From 4/17 showed   gluc 140, cr 0.80, gfr>60,       hba1c 6.5, umar 12                From 8/17 showed   gluc 145, cr 0.82, gfr>60,       hba1c 6.3,        chol 174, tg 174, hdl 44, ldl-c 95  From 11/17 showed         alt 82,                     ast 44, ap 57, tb 0.7  From 1/18 showed   gluc 185, cr 0.78, gfr>60,  alt 63,   hba1c 6.7,        chol 138, tg 174, hdl 51, ldl-c 52  From 5/18 showed   gluc 170, cr 0.79, gfr>60,  alt 77,   hba1c 7.2,        chol 138, tg 142, hdl 40, ldl-c 50,              ast 49, ap 60, tb 0.8, psa 0.2  From 10/18 showed gluc 236, cr 0.86, gfr>60,       hba1c 8.8,  umar 54  From 1/19 showed   gluc 109, cr 0.90, gfr>60,  alt 57,   hba1c 5.8,  umar 18,  chol 117, tg 179, hdl 40, ldl-c 41,            From 7/19 showed           hba1c 5.5,  umar 18,                  psa 0.40  From 12/19 showed gluc 138, cr 0.90, gfr>60,  alt 62,   hba1c 5.6,        chol 134, tg 180, hdl 43, ldl-c 55,   wbc 11.1, hb 17.8, plt 214,    ast 34, ap 47, tb 1.0  From 1/20 showed   gluc 151, cr 0.80, gfr>60,                      wbc 9.4,   hb 17.4, plt 220, ua neg  From 7/20 showed         alt 52,   hba1c 6.1,  umar 21,  chol 118, tg 92,   hdl 48, ldl-c 52  From 1/21 showed   gluc 107, cr 0.80, gfr>60,  alt 42,   hba1c 5.5,                    wbc 9.2,   hb17.5, plt 232, psa 0.50  From 7/21 showed           hba1c 5.0,  umar 11,  chol 104, tg 90,   hdl 43, ldl-c 43  From 1/22 showed   gluc 120, cr 0.78, gfr>60,  alt 20,   hba1c 5.2,                    wbc 8.0,   hb 17.1, plt 222,psa 0.50    Results for orders placed or performed during the hospital encounter of 06/30/22   MICROALBUMIN, UR, RAND W/ MICROALB/CREAT RATIO   Result Value Ref Range    Microalbumin,urine random 3.77 (H) 0 - 3.0 MG/DL    Creatinine, urine 392.00 (H) 30 - 125 mg/dL    Microalbumin/Creat ratio (mg/g creat) 10 0 - 30 mg/g   LIPID PANEL   Result Value Ref Range    LIPID PROFILE          Cholesterol, total 92 <200 MG/DL    Triglyceride 69 <150 MG/DL    HDL Cholesterol 47 40 - 60 MG/DL    LDL, calculated 31.2 0 - 100 MG/DL    VLDL, calculated 13.8 MG/DL    CHOL/HDL Ratio 2.0 0 - 5.0     HEMOGLOBIN A1C WITH EAG   Result Value Ref Range    Hemoglobin A1c 5.6 4.2 - 5.6 %    Est. average glucose 114 mg/dL       We reviewed the patient's labs from the last several visits to point out trends in the numbers        Patient Active Problem List   Diagnosis Code    Dyslipidemia E78.5    Colon polyp Dr Brenden Del Castillo 12/13 K63.5    Chronic venous hypertension without complications N21.085    Primary hypertension I10    Tobacco dependence syndrome F17.200    Diverticulosis  K57.30    Overweight (BMI 25.0-29. 9) E66.3    Diabetes mellitus due to underlying condition, controlled, with microalbuminuria, without long-term current use of insulin (HCC) E08.29, R80.9     Assessment and plan:  1. Hypertension. Controlled on current   2. Overweight. Lifestyle and dietary measures, portion control, IF. Congratulated him on his success thus far; currently >20% wt loss (from 258 to 190s)  3. Fatty liver. lfts normalized w wt loss  4. DM. Doing well on dietary mgmt;  f/u ophth  5. Dyslipidemia. Continue current regimen. 6.  Smoking cessation reiterated  7. Vascular. F/U vascular as needed  8. Ortho. F/U Dr Yuli Murillo  9. Polycythemia. Longstanding. Declined kristen evaluation, smoking cessation as above, check Jak2 and epo next draw  10. [id number]  given        RTC 1/23    Above conditions discussed at length and patient vocalized understanding.   All questions answered to patient satisfaction          ICD-10-CM ICD-9-CM    1. Encounter for immunization  Z23 V03.89 PNEUMOCOCCAL, PCV-13, (AGE 6 WKS+), IM      2. Polycythemia  D75.1 238.4 CBC W/O DIFF      ERYTHROPOIETIN      JAK2 MUTATION ANALYSIS      3. Screening for malignant neoplasm of prostate  Z12.5 V76.44 PSA SCREENING (SCREENING)      4. Primary hypertension  I10 401.9       5. Chronic venous hypertension involving both sides  I87.303 459.30       6. Hyperplastic colonic polyp, unspecified part of colon  K63.5 211.3       7. Diabetes mellitus due to underlying condition, controlled, with microalbuminuria, without long-term current use of insulin (HCC)  V12.49 181.71 METABOLIC PANEL, COMPREHENSIVE    R80.9 791.0 HEMOGLOBIN A1C WITH EAG      8. Overweight (BMI 25.0-29. 9)  E66.3 278.02       9.  Dyslipidemia  E78.5 272.4

## 2022-08-01 ENCOUNTER — OFFICE VISIT (OUTPATIENT)
Dept: INTERNAL MEDICINE CLINIC | Age: 62
End: 2022-08-01
Payer: COMMERCIAL

## 2022-08-01 VITALS
RESPIRATION RATE: 16 BRPM | OXYGEN SATURATION: 98 % | HEART RATE: 60 BPM | WEIGHT: 199 LBS | BODY MASS INDEX: 29.47 KG/M2 | DIASTOLIC BLOOD PRESSURE: 79 MMHG | HEIGHT: 69 IN | TEMPERATURE: 97 F | SYSTOLIC BLOOD PRESSURE: 121 MMHG

## 2022-08-01 DIAGNOSIS — E08.29 DIABETES MELLITUS DUE TO UNDERLYING CONDITION, CONTROLLED, WITH MICROALBUMINURIA, WITHOUT LONG-TERM CURRENT USE OF INSULIN (HCC): ICD-10-CM

## 2022-08-01 DIAGNOSIS — E66.3 OVERWEIGHT (BMI 25.0-29.9): ICD-10-CM

## 2022-08-01 DIAGNOSIS — Z23 ENCOUNTER FOR IMMUNIZATION: Primary | ICD-10-CM

## 2022-08-01 DIAGNOSIS — I87.303 CHRONIC VENOUS HYPERTENSION INVOLVING BOTH SIDES: ICD-10-CM

## 2022-08-01 DIAGNOSIS — E78.5 DYSLIPIDEMIA: ICD-10-CM

## 2022-08-01 DIAGNOSIS — R80.9 DIABETES MELLITUS DUE TO UNDERLYING CONDITION, CONTROLLED, WITH MICROALBUMINURIA, WITHOUT LONG-TERM CURRENT USE OF INSULIN (HCC): ICD-10-CM

## 2022-08-01 DIAGNOSIS — D75.1 POLYCYTHEMIA: ICD-10-CM

## 2022-08-01 DIAGNOSIS — Z12.5 SCREENING FOR MALIGNANT NEOPLASM OF PROSTATE: ICD-10-CM

## 2022-08-01 DIAGNOSIS — I10 ESSENTIAL HYPERTENSION WITH GOAL BLOOD PRESSURE LESS THAN 140/90: ICD-10-CM

## 2022-08-01 DIAGNOSIS — K63.5 HYPERPLASTIC COLONIC POLYP, UNSPECIFIED PART OF COLON: ICD-10-CM

## 2022-08-01 PROCEDURE — 3044F HG A1C LEVEL LT 7.0%: CPT | Performed by: INTERNAL MEDICINE

## 2022-08-01 PROCEDURE — 99214 OFFICE O/P EST MOD 30 MIN: CPT | Performed by: INTERNAL MEDICINE

## 2022-08-01 PROCEDURE — 90670 PCV13 VACCINE IM: CPT | Performed by: INTERNAL MEDICINE

## 2022-08-01 PROCEDURE — 90471 IMMUNIZATION ADMIN: CPT | Performed by: INTERNAL MEDICINE

## 2022-08-01 NOTE — PROGRESS NOTES
Virgilio Shirley is a 60 y.o. male who presents for routine immunizations. He denies any symptoms , reactions or allergies that would exclude them from being immunized today. Risks and adverse reactions were discussed and the VIS was given to them. All questions were addressed. He was observed for 5 min post injection. There were no reactions observed. Verbal order for PCV 13 IM received per Dr. Ori Jimenez for pt Virgilio Shirley. Order written down and read back to provider for verification prior to completion.

## 2022-08-01 NOTE — PROGRESS NOTES
Batool Storey presents with   Chief Complaint   Patient presents with    Follow-up     6 month    Hypertension    Diabetes    Labs     6-30-22                1. \"Have you been to the ER, urgent care clinic since your last visit? Hospitalized since your last visit? \" No    2. \"Have you seen or consulted any other health care providers outside of the [address] [place] [address] since your last visit? \" No     3. For patients aged 39-70: Has the patient had a colonoscopy / FIT/ Cologuard?  Yes - no Care Gap present

## 2023-01-25 DIAGNOSIS — E78.5 DYSLIPIDEMIA: ICD-10-CM

## 2023-01-26 RX ORDER — ICOSAPENT ETHYL 1000 MG/1
CAPSULE ORAL
Qty: 360 CAPSULE | Refills: 3 | Status: SHIPPED | OUTPATIENT
Start: 2023-01-26

## 2023-01-31 NOTE — PROGRESS NOTES
60 y.o. WHITE/NON- male who presents for RPE    No cardiovascular complaints. Just not able to exercise much as the left knee 'still doesn't feel right' 3 yrs after the replacement. Not interested in seeing another ortho for 2nd opinion    Denies polyuria, polydipsia, nocturia, vision change. Sugars are in 100-120 range Remains on IF from 12-6, down to 1 meal a day, weight is up slightly with the holidays    Vitals 2/6/2023 8/1/2022 1/3/2022   Weight 207 lb 199 lb 196 lb     Vitals 7/2/2021 1/18/2021 1/8/2020 12/9/2019   Weight 206 lb 227 lb 241 lb 247 lb     He's using 1/2 usual dose vascepa     No cp, sob, pnd, edema, palpitations or syncope. No gi or gu complaints. He did get a letter from [place] [id number] to schedule the follow up colo but has  not done so yet    Still smoking. He has declined going for kristen eval    Past Medical History:   Diagnosis Date    Breast mass, right 2018    spontaneously resolved, saw Dr iTen Nguyen polyp 12/2013    Dr Gagandeep Ladd; and divertics    DM (diabetes mellitus) ([facility] [address] 2016    IFG 2007; on basis of fbs>125; intol metformin, sglt2 not covered    Dyslipidemia     calculated 10 year risk score 13.6% (9/15); 11.1% (3/16)    Fatty liver     on biopsy 2000 in ;  Fib-4 was 1.25 from 3/15    H/O echocardiogram 08/2018    ef 61%, mild conc lvh, no wma, tr MR, pap 29mmHg    Heel spur     LEFT    Hypertension     Morbid obesity ([facility] [address]     peak weight 255 lbs, bmi 37.6 from 3/12; dec emilia supp, med sup wt loss, bariatrics; IF 5/18 start weight 258 lbs but unable to comply    Osteoarthritis 2018    Dr Lewis Diaz; right shoulder on bone scan    Pancreatitis 10/2014    and cholecystitis Dr Daniel Morales    Rosacea     Tobacco dependence syndrome     Venous hypertension 08/03/2015    Dr Mariana Loera     Past Surgical History:   Procedure Laterality Date    HX CHOLECYSTECTOMY  12/2014    Dr Delon Jean Baptiste COLONOSCOPY      negative (2000); diverticulosis and polyps (2013) Dr Jaquelin Garcia HECESIA  09/2017 US thyroid negative    HX KNEE REPLACEMENT Left 01/2020    Dr Mari Marie Left 2015    Dr Edward Horne UROLOGICAL      testicular torsion    HX VASECTOMY      PA UNLISTED PROCEDURE VASCULAR SURGERY  09/2015    LEFT GSV and EVLT Dr Bartlett Bosworth History    Marital status:      Spouse name: Not on file    Number of children: 1    Years of education: Not on file    Highest education level: Not on file   Occupational History    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 of cigarette smoking   Substance and Sexual Activity    Alcohol use: Yes     Comment: social    Drug use: No    Sexual activity: Not on file   Other Topics Concern    Not on file   Social History Narrative    Not on file     Social Determinants of Health     Financial Resource Strain: Not on file   Food Insecurity: Not on file   Transportation Needs: Not on file   Physical Activity: Not on file   Stress: Not on file   Social Connections: Not on file   Intimate Partner Violence: Not on file   Housing Stability: Not on file     Current Outpatient Medications   Medication Sig    atorvastatin (LIPITOR) 10 mg tablet Take 1 Tablet by mouth daily. amLODIPine (NORVASC) 5 mg tablet Take 1 Tablet by mouth daily. icosapent ethyL (VASCEPA) 1 gram capsule TAKE TWO CAPSULES BY MOUTH TWICE A DAY WITH A MEAL    tiZANidine (ZANAFLEX) 4 mg tablet Take 1 Tablet by mouth three (3) times daily as needed for Muscle Spasm(s). Lancets (ACCU-CHEK MULTICLIX LANCET) misc TEST TWO TIMES A DAY. DX:E11.9     No current facility-administered medications for this visit.      Allergies   Allergen Reactions    Metformin (Bulk) Diarrhea    Pcn [Penicillins] Swelling     REVIEW OF SYSTEMS: colo 12/13  Damle  Ophtho - no vision change or eye pain  Oral - no mouth pain, tongue or tooth problems  Ears - no hearing loss, ear pain, fullness, no swallowing problems  Cardiac - no CP, PND, orthopnea, edema, palpitations or syncope  Chest - no breast masses. Resp - no wheezing, chronic coughing, dyspnea  GI - no heartburn, nausea, vomiting, change in bowel habits, bleeding, hemorrhoids  Urinary - no dysuria, hematuria, flank pain, urgency, frequency    Visit Vitals  /69   Pulse 60   Temp 97.6 °F (36.4 °C) (Temporal)   Resp 19   Ht 5' 9\" (1.753 m)   Wt 207 lb (93.9 kg)   SpO2 98%   BMI 30.57 kg/m²   A&O x3  Affect is appropriate. Mood stable  No apparent distress  Anicteric, no JVD, adenopathy. No carotid bruits or radiated murmur  Lungs clear to auscultation, no wheezes or rales  Heart showed regular rate and rhythm. No murmur, rubs, gallops  Abdomen soft nontender, no hepatosplenomegaly or masses.    Extremities without edema, venous varicosities noted  Foot exam bilaterally showed  Reflexes 2+   Vibration, proprioception intact  Pulses 1-2+ DP and PT    LABS    From 1/07 showed   gluc 108, cr 0.90,             alt 110,  hba1c 5.3,                   chol 212, tg 129, hdl 51, ldl-c 135, wbc 8.1,  hb 17.7, plt 214,    ast 67  From 3/12 showed   gluc 128, cr 0.92, gfr 95,                hba1c 5.7, ldl-p 1412,                   wbc 7.0,  hb 17.0, plt 211, tsh 2.13  From 4/12 showed   gluc 95,   cr 0.89, gfr 98,  alt 57,    hba1c 5.7,                   chol 163, tg 120, hdl 47, ldl-c 92  From 6/12 showed   gluc 90,   cr 0.80, gfr 85,  alt 19,    hba1c 5.2, ldl-p 1416  From 1/13 showed   gluc 96,   cr 0.88, gfr 98,  alt 24,    hba1c 5.1, ldl-p 1421, chol 185, tg 125, hdl 49, ldl-c 111   From 10/14 showed gluc 123, cr 1.00, gfr>60, alt 59,                      wbc 9.6,  hb 16.7, plt 222,    ast 47, ap 69, tb 4.7, lip 7684, ck/trop neg  From 3/15 showed   gluc 118, cr 0.81, gfr>60, alt 57,    hba1c 5.9,        chol 184, tg 200, hdl 44, ldl-c 100,  wbc 8.0,  hb 17.1, plt 216, tsh 2.23  From 9/15 showed   gluc 117, cr 0.90, gfr 95,       hba1c 6.0,        chol 200, tg 176, hdl 46, ldl-c 119  From 3/16 showed           hba1c 5.7,        chol 169, tg 154, hdl 45, ldl-c 93,              tsh 2.09  From 9/16 showed   gluc 132, cr 0.77, gfr>60,       hba1c 5.8,        chol 162, tg 160, hdl 43, ldl-c 87,    wbc 8.3,   hb 16.3, plt 210  From 4/17 showed   gluc 140, cr 0.80, gfr>60,       hba1c 6.5, umar 12                From 8/17 showed   gluc 145, cr 0.82, gfr>60,       hba1c 6.3,        chol 174, tg 174, hdl 44, ldl-c 95  From 11/17 showed         alt 82,                     ast 44, ap 57, tb 0.7  From 1/18 showed   gluc 185, cr 0.78, gfr>60,  alt 63,   hba1c 6.7,        chol 138, tg 174, hdl 51, ldl-c 52  From 5/18 showed   gluc 170, cr 0.79, gfr>60,  alt 77,   hba1c 7.2,        chol 138, tg 142, hdl 40, ldl-c 50,              ast 49, ap 60, tb 0.8, psa 0.2  From 10/18 showed gluc 236, cr 0.86, gfr>60,       hba1c 8.8,  umar 54  From 1/19 showed   gluc 109, cr 0.90, gfr>60,  alt 57,   hba1c 5.8,  umar 18,  chol 117, tg 179, hdl 40, ldl-c 41,            From 7/19 showed           hba1c 5.5,  umar 18,                  psa 0.40  From 12/19 showed gluc 138, cr 0.90, gfr>60,  alt 62,   hba1c 5.6,        chol 134, tg 180, hdl 43, ldl-c 55,   wbc 11.1, hb 17.8, plt 214,    ast 34, ap 47, tb 1.0  From 1/20 showed   gluc 151, cr 0.80, gfr>60,                      wbc 9.4,   hb 17.4, plt 220, ua neg  From 7/20 showed         alt 52,   hba1c 6.1,  umar 21,  chol 118, tg 92,   hdl 48, ldl-c 52  From 1/21 showed   gluc 107, cr 0.80, gfr>60,  alt 42,   hba1c 5.5,                    wbc 9.2,   hb17.5, plt 232, psa 0.50  From 7/21 showed           hba1c 5.0,  umar 11,  chol 104, tg 90,   hdl 43, ldl-c 43  From 1/22 showed   gluc 120, cr 0.78, gfr>60,  alt 20,   hba1c 5.2,                    wbc 8.0,   hb 17.1, plt 222,psa 0.50  From 7/22 showed hba1c 5.6, umar 10,   chol 92,   tg 69,   hdl 47, ldl-c 31    Results for orders placed or performed during the hospital encounter of 02/02/23   CBC W/O DIFF   Result Value Ref Range    WBC 9.1 4.6 - 13.2 K/uL    RBC 5.49 4.35 - 5.65 M/uL    HGB 16.8 (H) 13.0 - 16.0 g/dL    HCT 47.5 36.0 - 48.0 %    MCV 86.5 78.0 - 100.0 FL    MCH 30.6 24.0 - 34.0 PG    MCHC 35.4 31.0 - 37.0 g/dL    RDW 12.3 11.6 - 14.5 %    PLATELET 850 522 - 647 K/uL    MPV 10.6 9.2 - 11.8 FL    NRBC 0.0 0  WBC    ABSOLUTE NRBC 0.00 0.00 - 0.01 K/uL   PSA SCREENING (SCREENING)   Result Value Ref Range    Prostate Specific Ag 0.5 0.0 - 4.0 ng/mL   ERYTHROPOIETIN   Result Value Ref Range    Erythropoietin 6.4 2.6 - 73.9 mIU/mL   METABOLIC PANEL, COMPREHENSIVE   Result Value Ref Range    Sodium 137 136 - 145 mmol/L    Potassium 4.4 3.5 - 5.5 mmol/L    Chloride 107 100 - 111 mmol/L    CO2 29 21 - 32 mmol/L    Anion gap 1 (L) 3.0 - 18 mmol/L    Glucose 130 (H) 74 - 99 mg/dL    BUN 18 7.0 - 18 MG/DL    Creatinine 0.97 0.6 - 1.3 MG/DL    BUN/Creatinine ratio 19 12 - 20      eGFR >60 >60 ml/min/1.73m2    Calcium 9.0 8.5 - 10.1 MG/DL    Bilirubin, total 0.5 0.2 - 1.0 MG/DL    ALT (SGPT) 29 16 - 61 U/L    AST (SGOT) 18 10 - 38 U/L    Alk. phosphatase 44 (L) 45 - 117 U/L    Protein, total 7.3 6.4 - 8.2 g/dL    Albumin 4.1 3.4 - 5.0 g/dL    Globulin 3.2 2.0 - 4.0 g/dL    A-G Ratio 1.3 0.8 - 1.7     HEMOGLOBIN A1C WITH EAG   Result Value Ref Range    Hemoglobin A1c 5.6 4.2 - 5.6 %    Est. average glucose 114 mg/dL       We reviewed the patient's labs from the last several visits to point out trends in the numbers        Patient Active Problem List   Diagnosis Code    Dyslipidemia E78.5    Colon polyp Dr Mari Walters 12/13 K63.5    Chronic venous hypertension without complications P21.251    Primary hypertension I10    Tobacco dependence syndrome F17.200    Diverticulosis  K57.30    Overweight (BMI 25.0-29. 9) E66.3    Diabetes mellitus due to underlying condition, controlled, with microalbuminuria, without long-term current use of insulin (HCC) E08.29, R80.9     Assessment and plan:  1. Hypertension. Controlled on current   2. Overweight. Lifestyle and dietary measures, portion control, IF. Down from peak 258 lbs  3. Fatty liver. lfts normalized w wt loss  4. DM. Doing well on dietary mgmt;  f/u ophth  5. Dyslipidemia. Continue current regimen. 6.  Smoking cessation reiterated  7. Vascular. F/U vascular as needed  8. Ortho. F/U Dr Bety Coffman  9. Polycythemia. Longstanding. Declined kristen evaluation, smoking cessation as above, check Jak2 and epo next draw  10. He promises to call GI to schedule colo      RTC 8/23    Above conditions discussed at length and patient vocalized understanding. All questions answered to patient satisfaction          ICD-10-CM ICD-9-CM    1. Physical exam  Z00.00 V70.9       2. Dyslipidemia  E78.5 272.4 atorvastatin (LIPITOR) 10 mg tablet      LIPID PANEL      3. Hyperplastic colonic polyp, unspecified part of colon  K63.5 211.3       4. Diabetes mellitus due to underlying condition, controlled, with microalbuminuria, without long-term current use of insulin (HCC)  E08.29 249.40 HEMOGLOBIN A1C WITH EAG    E37.8 053.3 METABOLIC PANEL, BASIC      MICROALBUMIN, UR, RAND W/ MICROALB/CREAT RATIO       DIABETES FOOT EXAM      5. Primary hypertension  I10 401.9 amLODIPine (NORVASC) 5 mg tablet      6. Tobacco dependence syndrome  F17.200 305.1       7. Obesity (BMI 30.0-34. 9)  E66.9 278.00

## 2023-02-02 ENCOUNTER — HOSPITAL ENCOUNTER (OUTPATIENT)
Dept: LAB | Age: 63
Discharge: HOME | End: 2023-02-02
Payer: COMMERCIAL

## 2023-02-02 DIAGNOSIS — Z12.5 SCREENING FOR MALIGNANT NEOPLASM OF PROSTATE: ICD-10-CM

## 2023-02-02 DIAGNOSIS — R80.9 DIABETES MELLITUS DUE TO UNDERLYING CONDITION, CONTROLLED, WITH MICROALBUMINURIA, WITHOUT LONG-TERM CURRENT USE OF INSULIN (HCC): ICD-10-CM

## 2023-02-02 DIAGNOSIS — E08.29 DIABETES MELLITUS DUE TO UNDERLYING CONDITION, CONTROLLED, WITH MICROALBUMINURIA, WITHOUT LONG-TERM CURRENT USE OF INSULIN (HCC): ICD-10-CM

## 2023-02-02 DIAGNOSIS — D75.1 POLYCYTHEMIA: ICD-10-CM

## 2023-02-02 LAB
ALBUMIN SERPL-MCNC: 4.1 G/DL (ref 3.4–5)
ALBUMIN/GLOB SERPL: 1.3 (ref 0.8–1.7)
ALP SERPL-CCNC: 44 U/L (ref 45–117)
ALT SERPL-CCNC: 29 U/L (ref 16–61)
ANION GAP SERPL CALC-SCNC: 1 MMOL/L (ref 3–18)
AST SERPL-CCNC: 18 U/L (ref 10–38)
BILIRUB SERPL-MCNC: 0.5 MG/DL (ref 0.2–1)
BUN SERPL-MCNC: 18 MG/DL (ref 7–18)
BUN/CREAT SERPL: 19 (ref 12–20)
CALCIUM SERPL-MCNC: 9 MG/DL (ref 8.5–10.1)
CHLORIDE SERPL-SCNC: 107 MMOL/L (ref 100–111)
CO2 SERPL-SCNC: 29 MMOL/L (ref 21–32)
CREAT SERPL-MCNC: 0.97 MG/DL (ref 0.6–1.3)
ERYTHROCYTE [DISTWIDTH] IN BLOOD BY AUTOMATED COUNT: 12.3 % (ref 11.6–14.5)
EST. AVERAGE GLUCOSE BLD GHB EST-MCNC: 114 MG/DL
GLOBULIN SER CALC-MCNC: 3.2 G/DL (ref 2–4)
GLUCOSE SERPL-MCNC: 130 MG/DL (ref 74–99)
HBA1C MFR BLD: 5.6 % (ref 4.2–5.6)
HCT VFR BLD AUTO: 47.5 % (ref 36–48)
HGB BLD-MCNC: 16.8 G/DL (ref 13–16)
MCH RBC QN AUTO: 30.6 PG (ref 24–34)
MCHC RBC AUTO-ENTMCNC: 35.4 G/DL (ref 31–37)
MCV RBC AUTO: 86.5 FL (ref 78–100)
NRBC # BLD: 0 K/UL (ref 0–0.01)
NRBC BLD-RTO: 0 PER 100 WBC
PLATELET # BLD AUTO: 227 K/UL (ref 135–420)
PMV BLD AUTO: 10.6 FL (ref 9.2–11.8)
POTASSIUM SERPL-SCNC: 4.4 MMOL/L (ref 3.5–5.5)
PROT SERPL-MCNC: 7.3 G/DL (ref 6.4–8.2)
PSA SERPL-MCNC: 0.5 NG/ML (ref 0–4)
RBC # BLD AUTO: 5.49 M/UL (ref 4.35–5.65)
SODIUM SERPL-SCNC: 137 MMOL/L (ref 136–145)
WBC # BLD AUTO: 9.1 K/UL (ref 4.6–13.2)

## 2023-02-02 PROCEDURE — 82668 ASSAY OF ERYTHROPOIETIN: CPT

## 2023-02-02 PROCEDURE — 84153 ASSAY OF PSA TOTAL: CPT

## 2023-02-02 PROCEDURE — 36415 COLL VENOUS BLD VENIPUNCTURE: CPT

## 2023-02-02 PROCEDURE — 81270 JAK2 GENE: CPT

## 2023-02-02 PROCEDURE — 85027 COMPLETE CBC AUTOMATED: CPT

## 2023-02-02 PROCEDURE — 80053 COMPREHEN METABOLIC PANEL: CPT

## 2023-02-02 PROCEDURE — 83036 HEMOGLOBIN GLYCOSYLATED A1C: CPT

## 2023-02-03 NOTE — PROGRESS NOTES
Antonio Ward presents today for   Chief Complaint   Patient presents with    Physical    Labs     2-2-23    Diabetes     With microalbuminuria     Hypertension    Cholesterol Problem     Dyslipidemia    Polycythemia     1. \"Have you been to the ER, urgent care clinic since your last visit? Hospitalized since your last visit? \" no    2. \"Have you seen or consulted any other health care providers outside of the [address] [place] [address] since your last visit? \" no     3. For patients aged 39-70: Has the patient had a colonoscopy / FIT/ Cologuard? Yes - no Care Gap present      If the patient is female:    4. For patients aged 41-77: Has the patient had a mammogram within the past 2 years? NA - based on age or sex  See top three    5. For patients aged 21-65: Has the patient had a pap smear?  NA - based on age or sex

## 2023-02-04 LAB — EPO SERPL-ACNC: 6.4 MIU/ML (ref 2.6–18.5)

## 2023-02-06 ENCOUNTER — OFFICE VISIT (OUTPATIENT)
Dept: INTERNAL MEDICINE CLINIC | Age: 63
End: 2023-02-06
Payer: COMMERCIAL

## 2023-02-06 VITALS
RESPIRATION RATE: 19 BRPM | HEART RATE: 60 BPM | OXYGEN SATURATION: 98 % | BODY MASS INDEX: 30.66 KG/M2 | DIASTOLIC BLOOD PRESSURE: 69 MMHG | WEIGHT: 207 LBS | SYSTOLIC BLOOD PRESSURE: 131 MMHG | TEMPERATURE: 97.6 F | HEIGHT: 69 IN

## 2023-02-06 DIAGNOSIS — I10 ESSENTIAL HYPERTENSION WITH GOAL BLOOD PRESSURE LESS THAN 140/90: ICD-10-CM

## 2023-02-06 DIAGNOSIS — Z00.00 PHYSICAL EXAM: Primary | ICD-10-CM

## 2023-02-06 DIAGNOSIS — E08.29 DIABETES MELLITUS DUE TO UNDERLYING CONDITION, CONTROLLED, WITH MICROALBUMINURIA, WITHOUT LONG-TERM CURRENT USE OF INSULIN (HCC): ICD-10-CM

## 2023-02-06 DIAGNOSIS — R80.9 DIABETES MELLITUS DUE TO UNDERLYING CONDITION, CONTROLLED, WITH MICROALBUMINURIA, WITHOUT LONG-TERM CURRENT USE OF INSULIN (HCC): ICD-10-CM

## 2023-02-06 DIAGNOSIS — K63.5 HYPERPLASTIC COLONIC POLYP, UNSPECIFIED PART OF COLON: ICD-10-CM

## 2023-02-06 DIAGNOSIS — E66.9 OBESITY (BMI 30.0-34.9): ICD-10-CM

## 2023-02-06 DIAGNOSIS — E78.5 DYSLIPIDEMIA: ICD-10-CM

## 2023-02-06 DIAGNOSIS — F17.200 TOBACCO DEPENDENCE SYNDROME: ICD-10-CM

## 2023-02-06 PROCEDURE — 99396 PREV VISIT EST AGE 40-64: CPT | Performed by: INTERNAL MEDICINE

## 2023-02-06 PROCEDURE — 3075F SYST BP GE 130 - 139MM HG: CPT | Performed by: INTERNAL MEDICINE

## 2023-02-06 PROCEDURE — 3078F DIAST BP <80 MM HG: CPT | Performed by: INTERNAL MEDICINE

## 2023-02-06 RX ORDER — ATORVASTATIN CALCIUM 10 MG/1
10 TABLET, FILM COATED ORAL DAILY
Qty: 90 TABLET | Refills: 3 | Status: SHIPPED | OUTPATIENT
Start: 2023-02-06

## 2023-02-06 RX ORDER — AMLODIPINE BESYLATE 5 MG/1
5 TABLET ORAL DAILY
Qty: 90 TABLET | Refills: 3 | Status: SHIPPED | OUTPATIENT
Start: 2023-02-06

## 2023-02-08 LAB
DIRECTOR REVIEW: 489204: NORMAL
JAK2 P.V617F BLD/T QL: NORMAL
LABORATORY COMMENT REPORT: NORMAL

## 2023-02-23 DIAGNOSIS — E78.5 DYSLIPIDEMIA: Primary | ICD-10-CM

## 2023-03-27 SDOH — ECONOMIC STABILITY: FOOD INSECURITY: WITHIN THE PAST 12 MONTHS, THE FOOD YOU BOUGHT JUST DIDN'T LAST AND YOU DIDN'T HAVE MONEY TO GET MORE.: NEVER TRUE

## 2023-03-27 SDOH — ECONOMIC STABILITY: INCOME INSECURITY: HOW HARD IS IT FOR YOU TO PAY FOR THE VERY BASICS LIKE FOOD, HOUSING, MEDICAL CARE, AND HEATING?: NOT HARD AT ALL

## 2023-03-27 SDOH — ECONOMIC STABILITY: FOOD INSECURITY: WITHIN THE PAST 12 MONTHS, YOU WORRIED THAT YOUR FOOD WOULD RUN OUT BEFORE YOU GOT MONEY TO BUY MORE.: NEVER TRUE

## 2023-03-27 SDOH — ECONOMIC STABILITY: TRANSPORTATION INSECURITY
IN THE PAST 12 MONTHS, HAS LACK OF TRANSPORTATION KEPT YOU FROM MEETINGS, WORK, OR FROM GETTING THINGS NEEDED FOR DAILY LIVING?: NO

## 2023-03-27 SDOH — ECONOMIC STABILITY: HOUSING INSECURITY
IN THE LAST 12 MONTHS, WAS THERE A TIME WHEN YOU DID NOT HAVE A STEADY PLACE TO SLEEP OR SLEPT IN A SHELTER (INCLUDING NOW)?: NO

## 2023-03-30 ENCOUNTER — OFFICE VISIT (OUTPATIENT)
Age: 63
End: 2023-03-30
Payer: COMMERCIAL

## 2023-03-30 VITALS
TEMPERATURE: 98.7 F | OXYGEN SATURATION: 97 % | RESPIRATION RATE: 17 BRPM | SYSTOLIC BLOOD PRESSURE: 126 MMHG | BODY MASS INDEX: 31.4 KG/M2 | DIASTOLIC BLOOD PRESSURE: 76 MMHG | WEIGHT: 212 LBS | HEART RATE: 64 BPM | HEIGHT: 69 IN

## 2023-03-30 DIAGNOSIS — S76.211A INGUINAL STRAIN, RIGHT, INITIAL ENCOUNTER: Primary | ICD-10-CM

## 2023-03-30 PROCEDURE — 3074F SYST BP LT 130 MM HG: CPT | Performed by: INTERNAL MEDICINE

## 2023-03-30 PROCEDURE — 99213 OFFICE O/P EST LOW 20 MIN: CPT | Performed by: INTERNAL MEDICINE

## 2023-03-30 PROCEDURE — 3078F DIAST BP <80 MM HG: CPT | Performed by: INTERNAL MEDICINE

## 2023-03-30 ASSESSMENT — PATIENT HEALTH QUESTIONNAIRE - PHQ9
1. LITTLE INTEREST OR PLEASURE IN DOING THINGS: 0
SUM OF ALL RESPONSES TO PHQ QUESTIONS 1-9: 0
SUM OF ALL RESPONSES TO PHQ QUESTIONS 1-9: 0
2. FEELING DOWN, DEPRESSED OR HOPELESS: 0
SUM OF ALL RESPONSES TO PHQ QUESTIONS 1-9: 0
SUM OF ALL RESPONSES TO PHQ9 QUESTIONS 1 & 2: 0
SUM OF ALL RESPONSES TO PHQ QUESTIONS 1-9: 0

## 2023-03-30 NOTE — PROGRESS NOTES
Lucy Da Silva presents today for   Chief Complaint   Patient presents with    Groin Pain     Patient has been having pain for 3 weeks on the right groin region. Patient states the pain is a pressure feeling, not sharp or throbbing pain. 1. \"Have you been to the ER, urgent care clinic since your last visit? Hospitalized since your last visit? \" no    2. \"Have you seen or consulted any other health care providers outside of the [address] [place] [address] since your last visit? \" no     3. For patients aged 39-70: Has the patient had a colonoscopy / FIT/ Cologuard? Yes - no Care Gap present      If the patient is female:    4. For patients aged 41-77: Has the patient had a mammogram within the past 2 years? NA - based on age or sex      11. For patients aged 21-65: Has the patient had a pap smear?  NA - based on age or sex
TABLET BY MOUTH DAILY    atorvastatin (LIPITOR) 10 MG tablet TAKE ONE TABLET BY MOUTH DAILY    Icosapent Ethyl (VASCEPA) 1 g CAPS capsule Take 2 capsules by mouth 2 times daily (with meals)    tiZANidine (ZANAFLEX) 4 MG tablet Take 4 mg by mouth 3 times daily as needed     No current facility-administered medications for this visit. Allergies   Allergen Reactions    Metformin Diarrhea    Penicillins Swelling     Vitals:    03/30/23 1144   BP: 126/76   Pulse: 64   Resp: 17   Temp: 98.7 °F (37.1 °C)   SpO2: 97%   Abd soft and nt. Groin scar noted on right, no bulging or obvious hernia. No tenderness noted. Nl male genitalia and testicle/epididymis. Reprod of pain on activation of groin muscles. Some pain on ext rotation of right hip but not the exact location of pain he is complaining of    Assessment and plan:  1. Probable groin strain. Prn nsaid with meals, call if no better  2. Possible hip arthritis. He wants to hold off on any imaging for now        Above conditions discussed at length and patient vocalized understanding. All questions answered to patient satisfaction       Diagnosis Orders   1.  Inguinal strain, right, initial encounter

## 2023-04-24 ENCOUNTER — HOSPITAL ENCOUNTER (OUTPATIENT)
Facility: HOSPITAL | Age: 63
Setting detail: SPECIMEN
Discharge: HOME | End: 2023-04-27
Payer: COMMERCIAL

## 2023-04-24 ENCOUNTER — OFFICE VISIT (OUTPATIENT)
Age: 63
End: 2023-04-24
Payer: COMMERCIAL

## 2023-04-24 VITALS
RESPIRATION RATE: 19 BRPM | OXYGEN SATURATION: 98 % | SYSTOLIC BLOOD PRESSURE: 136 MMHG | HEIGHT: 69 IN | DIASTOLIC BLOOD PRESSURE: 72 MMHG | BODY MASS INDEX: 30.96 KG/M2 | TEMPERATURE: 98.3 F | HEART RATE: 58 BPM | WEIGHT: 209 LBS

## 2023-04-24 DIAGNOSIS — R35.0 URINARY FREQUENCY: Primary | ICD-10-CM

## 2023-04-24 DIAGNOSIS — R35.0 URINARY FREQUENCY: ICD-10-CM

## 2023-04-24 LAB
APPEARANCE UR: CLEAR
BILIRUB UR QL: NEGATIVE
COLOR UR: YELLOW
GLUCOSE UR STRIP.AUTO-MCNC: NEGATIVE MG/DL
HGB UR QL STRIP: NEGATIVE
KETONES UR QL STRIP.AUTO: NEGATIVE MG/DL
LEUKOCYTE ESTERASE UR QL STRIP.AUTO: NEGATIVE
NITRITE UR QL STRIP.AUTO: NEGATIVE
PH UR STRIP: 5 (ref 5–8)
PROT UR STRIP-MCNC: NEGATIVE MG/DL
SP GR UR REFRACTOMETRY: 1.02 (ref 1–1.03)
UROBILINOGEN UR QL STRIP.AUTO: 0.2 EU/DL (ref 0.2–1)

## 2023-04-24 PROCEDURE — 87086 URINE CULTURE/COLONY COUNT: CPT

## 2023-04-24 PROCEDURE — 81003 URINALYSIS AUTO W/O SCOPE: CPT

## 2023-04-24 PROCEDURE — 3078F DIAST BP <80 MM HG: CPT | Performed by: INTERNAL MEDICINE

## 2023-04-24 PROCEDURE — 3075F SYST BP GE 130 - 139MM HG: CPT | Performed by: INTERNAL MEDICINE

## 2023-04-24 PROCEDURE — 99213 OFFICE O/P EST LOW 20 MIN: CPT | Performed by: INTERNAL MEDICINE

## 2023-04-24 SDOH — ECONOMIC STABILITY: FOOD INSECURITY: WITHIN THE PAST 12 MONTHS, YOU WORRIED THAT YOUR FOOD WOULD RUN OUT BEFORE YOU GOT MONEY TO BUY MORE.: NEVER TRUE

## 2023-04-24 SDOH — ECONOMIC STABILITY: INCOME INSECURITY: HOW HARD IS IT FOR YOU TO PAY FOR THE VERY BASICS LIKE FOOD, HOUSING, MEDICAL CARE, AND HEATING?: NOT HARD AT ALL

## 2023-04-24 SDOH — ECONOMIC STABILITY: FOOD INSECURITY: WITHIN THE PAST 12 MONTHS, THE FOOD YOU BOUGHT JUST DIDN'T LAST AND YOU DIDN'T HAVE MONEY TO GET MORE.: NEVER TRUE

## 2023-04-24 ASSESSMENT — PATIENT HEALTH QUESTIONNAIRE - PHQ9
SUM OF ALL RESPONSES TO PHQ QUESTIONS 1-9: 0
1. LITTLE INTEREST OR PLEASURE IN DOING THINGS: 0
SUM OF ALL RESPONSES TO PHQ9 QUESTIONS 1 & 2: 0
2. FEELING DOWN, DEPRESSED OR HOPELESS: 0
SUM OF ALL RESPONSES TO PHQ QUESTIONS 1-9: 0

## 2023-04-24 NOTE — PROGRESS NOTES
60 y.o. male who presents for evaluation. We saw him late March after he pulled his groin reining in his dogs    The last week or so, he's noted a vague symptom complex in the urinary system. No actual frequency but he does have occ urgency; no actual nocturia but he feels he has to pee early AM.  Strength of stream is diminished some. No back or flank pain, hematuria, dysuria. Past Medical History:   Diagnosis Date    Breast mass, right 2018    spontaneously resolved, saw Dr Joe Vicente polyp 12/2013    Dr Olya Franklin; and divertics    DM (diabetes mellitus) ([facility] 2016    IFG 2007; on basis of fbs>125; intol metformin, sglt2 not covered    Dyslipidemia     calculated 10 year risk score 13.6% (9/15); 11.1% (3/16)    Fatty liver     on biopsy 2000 in [place]; Fib-4 was 1.25 from 3/15    H/O echocardiogram 08/2018    ef 61%, mild conc lvh, no wma, tr MR, pap 29mmHg    Heel spur     LEFT    Hypertension     Morbid obesity ([facility]     peak weight 255 lbs, bmi 37.6 from 3/12; dec henry supp, med sup wt loss, bariatrics; IF 5/18 start weight 258 lbs but unable to comply    Osteoarthritis 2018    Dr Yousif Gunderson; right shoulder on bone scan    Pancreatitis 10/2014    and cholecystitis Dr Binta Quiroz    Rosagaetano     Tobacco dependence syndrome     Venous hypertension 08/03/2015    Dr Beba Mixon     Current Outpatient Medications   Medication Sig    amLODIPine (NORVASC) 5 MG tablet TAKE ONE TABLET BY MOUTH DAILY    atorvastatin (LIPITOR) 10 MG tablet TAKE ONE TABLET BY MOUTH DAILY    Icosapent Ethyl (VASCEPA) 1 g CAPS capsule Take 2 capsules by mouth 2 times daily (with meals)    tiZANidine (ZANAFLEX) 4 MG tablet Take 4 mg by mouth 3 times daily as needed (Patient not taking: Reported on 4/24/2023)     No current facility-administered medications for this visit.      Allergies   Allergen Reactions    Metformin Diarrhea    Penicillins Swelling     Vitals:    04/24/23 1346   BP: 136/72   Pulse: 58   Resp: 19   Temp: 98.3 °F (36.8 °C)

## 2023-04-25 LAB
BACTERIA SPEC CULT: NORMAL
SERVICE CMNT-IMP: NORMAL

## 2023-04-27 ENCOUNTER — TELEPHONE (OUTPATIENT)
Age: 63
End: 2023-04-27

## 2023-04-27 DIAGNOSIS — Z78.9 DIFFICULTY MANAGING URINARY CATHETER: Primary | ICD-10-CM

## 2023-04-27 DIAGNOSIS — R39.89 URINARY PROBLEM: ICD-10-CM

## 2023-08-11 ENCOUNTER — HOSPITAL ENCOUNTER (OUTPATIENT)
Facility: HOSPITAL | Age: 63
Discharge: HOME | End: 2023-08-11
Payer: COMMERCIAL

## 2023-08-11 DIAGNOSIS — E78.5 DYSLIPIDEMIA: ICD-10-CM

## 2023-08-11 LAB
ALBUMIN SERPL-MCNC: 3.9 G/DL (ref 3.4–5)
ALBUMIN/GLOB SERPL: 1.2 (ref 0.8–1.7)
ALP SERPL-CCNC: 42 U/L (ref 45–117)
ALT SERPL-CCNC: 33 U/L (ref 16–61)
ANION GAP SERPL CALC-SCNC: 5 MMOL/L (ref 3–18)
AST SERPL-CCNC: 16 U/L (ref 10–38)
BILIRUB SERPL-MCNC: 0.8 MG/DL (ref 0.2–1)
BUN SERPL-MCNC: 18 MG/DL (ref 7–18)
BUN/CREAT SERPL: 21 (ref 12–20)
CALCIUM SERPL-MCNC: 8.5 MG/DL (ref 8.5–10.1)
CHLORIDE SERPL-SCNC: 103 MMOL/L (ref 100–111)
CHOLEST SERPL-MCNC: 128 MG/DL
CO2 SERPL-SCNC: 27 MMOL/L (ref 21–32)
CREAT SERPL-MCNC: 0.87 MG/DL (ref 0.6–1.3)
CREAT UR-MCNC: 104 MG/DL (ref 30–125)
EST. AVERAGE GLUCOSE BLD GHB EST-MCNC: 123 MG/DL
GLOBULIN SER CALC-MCNC: 3.3 G/DL (ref 2–4)
GLUCOSE SERPL-MCNC: 140 MG/DL (ref 74–99)
HBA1C MFR BLD: 5.9 % (ref 4.2–5.6)
HDLC SERPL-MCNC: 53 MG/DL (ref 40–60)
HDLC SERPL: 2.4 (ref 0–5)
LDLC SERPL CALC-MCNC: 57.4 MG/DL (ref 0–100)
LIPID PANEL: NORMAL
MICROALBUMIN UR-MCNC: 2.04 MG/DL (ref 0–3)
MICROALBUMIN/CREAT UR-RTO: 20 MG/G (ref 0–30)
POTASSIUM SERPL-SCNC: 4.4 MMOL/L (ref 3.5–5.5)
PROT SERPL-MCNC: 7.2 G/DL (ref 6.4–8.2)
SODIUM SERPL-SCNC: 135 MMOL/L (ref 136–145)
TRIGL SERPL-MCNC: 88 MG/DL
VLDLC SERPL CALC-MCNC: 17.6 MG/DL

## 2023-08-11 PROCEDURE — 36415 COLL VENOUS BLD VENIPUNCTURE: CPT

## 2023-08-11 PROCEDURE — 82043 UR ALBUMIN QUANTITATIVE: CPT

## 2023-08-11 PROCEDURE — 80061 LIPID PANEL: CPT

## 2023-08-11 PROCEDURE — 82570 ASSAY OF URINE CREATININE: CPT

## 2023-08-11 PROCEDURE — 80053 COMPREHEN METABOLIC PANEL: CPT

## 2023-08-11 PROCEDURE — 83036 HEMOGLOBIN GLYCOSYLATED A1C: CPT

## 2023-08-15 ENCOUNTER — OFFICE VISIT (OUTPATIENT)
Age: 63
End: 2023-08-15
Payer: COMMERCIAL

## 2023-08-15 VITALS
DIASTOLIC BLOOD PRESSURE: 83 MMHG | RESPIRATION RATE: 18 BRPM | WEIGHT: 202 LBS | OXYGEN SATURATION: 97 % | BODY MASS INDEX: 29.92 KG/M2 | HEART RATE: 62 BPM | SYSTOLIC BLOOD PRESSURE: 136 MMHG | TEMPERATURE: 98.1 F | HEIGHT: 69 IN

## 2023-08-15 DIAGNOSIS — N40.1 BPH WITH OBSTRUCTION/LOWER URINARY TRACT SYMPTOMS: ICD-10-CM

## 2023-08-15 DIAGNOSIS — N13.8 BPH WITH OBSTRUCTION/LOWER URINARY TRACT SYMPTOMS: ICD-10-CM

## 2023-08-15 DIAGNOSIS — R80.9 DIABETES MELLITUS DUE TO UNDERLYING CONDITION, CONTROLLED, WITH MICROALBUMINURIA, WITHOUT LONG-TERM CURRENT USE OF INSULIN (HCC): ICD-10-CM

## 2023-08-15 DIAGNOSIS — E78.5 DYSLIPIDEMIA: ICD-10-CM

## 2023-08-15 DIAGNOSIS — I10 PRIMARY HYPERTENSION: Primary | ICD-10-CM

## 2023-08-15 DIAGNOSIS — E66.3 OVERWEIGHT (BMI 25.0-29.9): ICD-10-CM

## 2023-08-15 DIAGNOSIS — E08.29 DIABETES MELLITUS DUE TO UNDERLYING CONDITION, CONTROLLED, WITH MICROALBUMINURIA, WITHOUT LONG-TERM CURRENT USE OF INSULIN (HCC): ICD-10-CM

## 2023-08-15 PROCEDURE — 3079F DIAST BP 80-89 MM HG: CPT | Performed by: INTERNAL MEDICINE

## 2023-08-15 PROCEDURE — 3075F SYST BP GE 130 - 139MM HG: CPT | Performed by: INTERNAL MEDICINE

## 2023-08-15 PROCEDURE — 99214 OFFICE O/P EST MOD 30 MIN: CPT | Performed by: INTERNAL MEDICINE

## 2023-08-23 NOTE — MR AVS SNAPSHOT
Visit Information Date & Time Provider Department Dept. Phone Encounter #  
 1/8/2018  9:00 AM Linette Jones MD [facility] [phone] [id number] Upcoming Health Maintenance Date Due Hepatitis C Screening 1960 FOOT EXAM Q1 7/21/1969 DTaP/Tdap/Td series (1 - Tdap) 7/21/1980 HEMOGLOBIN A1C Q6M 2/21/2018 MICROALBUMIN Q1 4/17/2018 EYE EXAM RETINAL OR DILATED Q1 6/15/2018 LIPID PANEL Q1 8/21/2018 COLONOSCOPY 12/13/2023 Allergies as of 1/8/2018  Review Complete On: 1/8/2018 By: Linette Jones MD  
  
 Severity Noted Reaction Type Reactions Metformin (Bulk)  08/25/2017    Diarrhea Pcn [Penicillins]    Swelling Current Immunizations  Reviewed on 8/25/2017 Name Date Influenza Vaccine (Quad) PF 8/25/2017  3:57 PM, 9/27/2016 Pneumococcal Polysaccharide (PPSV-23) 3/30/2016 Not reviewed this visit You Were Diagnosed With   
  
 Codes Comments Chest pain, unspecified type    -  Primary ICD-10-CM: R07.9 ICD-9-CM: 786.50 Essential hypertension with goal blood pressure less than 140/90     ICD-10-CM: I10 
ICD-9-CM: 401.9 Controlled type 2 diabetes mellitus without complication, without long-term current use of insulin ([facility]     ICD-10-CM: E11.9 ICD-9-CM: 250.00 Dyslipidemia     ICD-10-CM: E78.5 ICD-9-CM: 272.4 Vitals BP Pulse Temp Resp Height(growth percentile) Weight(growth percentile) 130/84 (BP 1 Location: Right arm, BP Patient Position: Sitting) 71 98.9 °F (37.2 °C) (Oral) 14 5' 9\" (1.753 m) 255 lb 12.8 oz (116 kg) SpO2 BMI Smoking Status 98% 37.78 kg/m2 Current Every Day Smoker Vitals History BMI and BSA Data Body Mass Index Body Surface Area 37.78 kg/m 2 2.38 m 2 Preferred Pharmacy Pharmacy Name Phone [facility] [address] [phone] Your Updated Medication List  
  
   
 This list is accurate as of: 1/8/18 10:09 AM.  Always use your most recent med list. amLODIPine 5 mg tablet Commonly known as:  NORVASC  
take 1 tablet by mouth daily  
  
 atorvastatin 10 mg tablet Commonly known as:  LIPITOR Take 1 Tab by mouth daily. COQ10  100-100 mg-unit Cap Generic drug:  coenzyme q10-vitamin e Take  by mouth. glucose blood VI test strips strip Commonly known as:  ACCU-CHEK JONATHAN PLUS TEST STRP  
TEST TWO TIMES A DAY. DX: E11.9 * Lancets Misc Commonly known as:  ACCU-CHEK MULTICLIX LANCET  
TEST TWO TIMES A DAY. DX:E11.9 * Lancets Misc Commonly known as:  ACCU-CHEK FASTCLIX Pt checks blood sugar twice daily, Dx E11.9 VITAMIN C 500 mg tablet Generic drug:  ascorbic acid (vitamin C) Take  by mouth.  
  
 vitamin E 400 unit capsule Commonly known as:  [address] Take  by mouth daily. * Notice: This list has 2 medication(s) that are the same as other medications prescribed for you. Read the directions carefully, and ask your doctor or other care provider to review them with you. We Performed the Following AMB POC EKG ROUTINE W/ 12 LEADS, INTER & REP [97397 CPT(R)] To-Do List   
 01/08/2018 Lab:  HEMOGLOBIN A1C W/O EAG   
  
 01/08/2018 Lab:  LIPID PANEL   
  
 01/08/2018 Lab:  METABOLIC PANEL, COMPREHENSIVE   
  
 01/08/2018 Lab:  MICROALBUMIN, UR, RAND W/ MICROALBUMIN/CREA RATIO Introducing \[facility] [place] [facility]\"" & HEALTH SERVICES! Dear Sulema Chung: Thank you for requesting a [employer] account. Our records indicate that you already have an active Admittedly account. You can access your account anytime at [email or web address] Did you know that you can access your hospital and ER discharge instructions at any time in Admittedly? You can also review all of your test results from your hospital stay or ER visit. Additional Information If you have questions, please visit the Frequently Asked Questions section of the [employer] website at [email or web address] Remember, Sapheneia is NOT to be used for urgent needs. For medical emergencies, dial 911. Now available from your iPhone and Android! Please provide this summary of care documentation to your next provider. Your primary care clinician is listed as Melodi Cranker. If you have any questions after today's visit, please call [phone]. Total Volume (Ccs): 1

## 2024-02-12 RX ORDER — AMLODIPINE BESYLATE 5 MG/1
TABLET ORAL
Qty: 90 TABLET | Refills: 2 | Status: SHIPPED | OUTPATIENT
Start: 2024-02-12

## 2024-02-12 RX ORDER — ATORVASTATIN CALCIUM 10 MG/1
TABLET, FILM COATED ORAL
Qty: 90 TABLET | Refills: 2 | Status: SHIPPED | OUTPATIENT
Start: 2024-02-12

## 2024-02-16 ENCOUNTER — HOSPITAL ENCOUNTER (OUTPATIENT)
Facility: HOSPITAL | Age: 64
Discharge: HOME | End: 2024-02-16
Payer: COMMERCIAL

## 2024-02-16 DIAGNOSIS — R80.9 DIABETES MELLITUS DUE TO UNDERLYING CONDITION, CONTROLLED, WITH MICROALBUMINURIA, WITHOUT LONG-TERM CURRENT USE OF INSULIN (HCC): ICD-10-CM

## 2024-02-16 DIAGNOSIS — E08.29 DIABETES MELLITUS DUE TO UNDERLYING CONDITION, CONTROLLED, WITH MICROALBUMINURIA, WITHOUT LONG-TERM CURRENT USE OF INSULIN (HCC): ICD-10-CM

## 2024-02-16 LAB
ANION GAP SERPL CALC-SCNC: 4 MMOL/L (ref 3–18)
BASOPHILS # BLD: 0 K/UL (ref 0–0.1)
BASOPHILS NFR BLD: 0 % (ref 0–2)
BUN SERPL-MCNC: 17 MG/DL (ref 7–18)
BUN/CREAT SERPL: 19 (ref 12–20)
CALCIUM SERPL-MCNC: 9.2 MG/DL (ref 8.5–10.1)
CHLORIDE SERPL-SCNC: 106 MMOL/L (ref 100–111)
CO2 SERPL-SCNC: 28 MMOL/L (ref 21–32)
CREAT SERPL-MCNC: 0.91 MG/DL (ref 0.6–1.3)
DIFFERENTIAL METHOD BLD: ABNORMAL
EOSINOPHIL # BLD: 0.2 K/UL (ref 0–0.4)
EOSINOPHIL NFR BLD: 2 % (ref 0–5)
ERYTHROCYTE [DISTWIDTH] IN BLOOD BY AUTOMATED COUNT: 12.2 % (ref 11.6–14.5)
GLUCOSE SERPL-MCNC: 148 MG/DL (ref 74–99)
HBA1C MFR BLD: 5.9 % (ref 4.2–5.6)
HCT VFR BLD AUTO: 52.6 % (ref 36–48)
HGB BLD-MCNC: 18 G/DL (ref 13–16)
IMM GRANULOCYTES # BLD AUTO: 0 K/UL (ref 0–0.04)
IMM GRANULOCYTES NFR BLD AUTO: 0 % (ref 0–0.5)
LYMPHOCYTES # BLD: 3.1 K/UL (ref 0.9–3.6)
LYMPHOCYTES NFR BLD: 32 % (ref 21–52)
MCH RBC QN AUTO: 29.8 PG (ref 24–34)
MCHC RBC AUTO-ENTMCNC: 34.2 G/DL (ref 31–37)
MCV RBC AUTO: 87.1 FL (ref 78–100)
MONOCYTES # BLD: 0.8 K/UL (ref 0.05–1.2)
MONOCYTES NFR BLD: 8 % (ref 3–10)
NEUTS SEG # BLD: 5.5 K/UL (ref 1.8–8)
NEUTS SEG NFR BLD: 57 % (ref 40–73)
NRBC # BLD: 0 K/UL (ref 0–0.01)
NRBC BLD-RTO: 0 PER 100 WBC
PLATELET # BLD AUTO: 229 K/UL (ref 135–420)
PMV BLD AUTO: 10.2 FL (ref 9.2–11.8)
POTASSIUM SERPL-SCNC: 4.5 MMOL/L (ref 3.5–5.5)
RBC # BLD AUTO: 6.04 M/UL (ref 4.35–5.65)
SODIUM SERPL-SCNC: 138 MMOL/L (ref 136–145)
WBC # BLD AUTO: 9.6 K/UL (ref 4.6–13.2)

## 2024-02-16 PROCEDURE — 80048 BASIC METABOLIC PNL TOTAL CA: CPT

## 2024-02-16 PROCEDURE — 85025 COMPLETE CBC W/AUTO DIFF WBC: CPT

## 2024-02-16 PROCEDURE — 83036 HEMOGLOBIN GLYCOSYLATED A1C: CPT

## 2024-02-20 ENCOUNTER — OFFICE VISIT (OUTPATIENT)
Age: 64
End: 2024-02-20
Payer: COMMERCIAL

## 2024-02-20 VITALS
BODY MASS INDEX: 33.18 KG/M2 | WEIGHT: 224 LBS | HEIGHT: 69 IN | TEMPERATURE: 97 F | RESPIRATION RATE: 16 BRPM | HEART RATE: 56 BPM | SYSTOLIC BLOOD PRESSURE: 139 MMHG | OXYGEN SATURATION: 99 % | DIASTOLIC BLOOD PRESSURE: 79 MMHG

## 2024-02-20 DIAGNOSIS — E78.5 DYSLIPIDEMIA: ICD-10-CM

## 2024-02-20 DIAGNOSIS — D75.1 ERYTHROCYTOSIS: ICD-10-CM

## 2024-02-20 DIAGNOSIS — F17.200 TOBACCO DEPENDENCE SYNDROME: ICD-10-CM

## 2024-02-20 DIAGNOSIS — Z00.00 PHYSICAL EXAM: Primary | ICD-10-CM

## 2024-02-20 DIAGNOSIS — I10 PRIMARY HYPERTENSION: ICD-10-CM

## 2024-02-20 DIAGNOSIS — E66.9 OBESITY (BMI 30.0-34.9): ICD-10-CM

## 2024-02-20 DIAGNOSIS — E08.29 DIABETES MELLITUS DUE TO UNDERLYING CONDITION, CONTROLLED, WITH MICROALBUMINURIA, WITHOUT LONG-TERM CURRENT USE OF INSULIN (HCC): ICD-10-CM

## 2024-02-20 DIAGNOSIS — G47.33 OSA (OBSTRUCTIVE SLEEP APNEA): ICD-10-CM

## 2024-02-20 DIAGNOSIS — R80.9 DIABETES MELLITUS DUE TO UNDERLYING CONDITION, CONTROLLED, WITH MICROALBUMINURIA, WITHOUT LONG-TERM CURRENT USE OF INSULIN (HCC): ICD-10-CM

## 2024-02-20 PROBLEM — E66.3 OVERWEIGHT (BMI 25.0-29.9): Status: RESOLVED | Noted: 2018-04-12 | Resolved: 2024-02-20

## 2024-02-20 PROBLEM — E66.811 OBESITY (BMI 30.0-34.9): Status: ACTIVE | Noted: 2024-02-20

## 2024-02-20 PROCEDURE — 3078F DIAST BP <80 MM HG: CPT | Performed by: INTERNAL MEDICINE

## 2024-02-20 PROCEDURE — 99396 PREV VISIT EST AGE 40-64: CPT | Performed by: INTERNAL MEDICINE

## 2024-02-20 PROCEDURE — 3075F SYST BP GE 130 - 139MM HG: CPT | Performed by: INTERNAL MEDICINE

## 2024-02-20 NOTE — PROGRESS NOTES
Saw Nelson presents today for   Chief Complaint   Patient presents with    6 Month Follow-Up       \"Have you been to the ER, urgent care clinic since your last visit?  Hospitalized since your last visit?\"    NO    “Have you seen or consulted any other health care providers outside of [facility] since your last visit?”    NO           
ck/trop neg  From 3/15 showed   gluc 118, cr 0.81, gfr>60, alt 57,    hba1c 5.9,        chol 184, tg 200, hdl 44, ldl-c 100,  wbc 8.0,  hb 17.1, plt 216, tsh 2.23  From 9/15 showed   gluc 117, cr 0.90, gfr 95,       hba1c 6.0,        chol 200, tg 176, hdl 46, ldl-c 119  From 3/16 showed           hba1c 5.7,        chol 169, tg 154, hdl 45, ldl-c 93,              tsh 2.09  From 9/16 showed   gluc 132, cr 0.77, gfr>60,       hba1c 5.8,        chol 162, tg 160, hdl 43, ldl-c 87,    wbc 8.3,   hb 16.3, plt 210  From 4/17 showed   gluc 140, cr 0.80, gfr>60,       hba1c 6.5, umar 12                From 8/17 showed   gluc 145, cr 0.82, gfr>60,       hba1c 6.3,        chol 174, tg 174, hdl 44, ldl-c 95  From 11/17 showed         alt 82,                     ast 44, ap 57, tb 0.7  From 1/18 showed   gluc 185, cr 0.78, gfr>60,  alt 63,   hba1c 6.7,        chol 138, tg 174, hdl 51, ldl-c 52  From 5/18 showed   gluc 170, cr 0.79, gfr>60,  alt 77,   hba1c 7.2,        chol 138, tg 142, hdl 40, ldl-c 50,              ast 49, ap 60, tb 0.8, psa 0.2  From 10/18 showed gluc 236, cr 0.86, gfr>60,       hba1c 8.8,  umar 54  From 1/19 showed   gluc 109, cr 0.90, gfr>60,  alt 57,   hba1c 5.8,  umar 18,  chol 117, tg 179, hdl 40, ldl-c 41,            From 7/19 showed           hba1c 5.5,  umar 18,                 psa 0.40  From 12/19 showed gluc 138, cr 0.90, gfr>60,  alt 62,   hba1c 5.6,        chol 134, tg 180, hdl 43, ldl-c 55,   wbc 11.1, hb 17.8, plt 214,    ast 34, ap 47, tb 1.0  From 1/20 showed   gluc 151, cr 0.80, gfr>60,                      wbc 9.4,  hb 17.4, plt 220, ua neg  From 7/20 showed         alt 52,   hba1c 6.1,  umar 21,  chol 118, tg 92,   hdl 48, ldl-c 52  From 1/21 showed   gluc 107, cr 0.80, gfr>60,  alt 42,   hba1c 5.5,                    wbc 9.2,  hb17.5, plt 232,  psa 0.50  From 7/21 showed           hba1c 5.0,  umar 11,  chol 104, tg 90,   hdl 43, ldl-c 43  From 1/22 showed   gluc 120, cr 0.78, gfr>60,  alt 20,

## 2024-03-06 ENCOUNTER — PATIENT MESSAGE (OUTPATIENT)
Age: 64
End: 2024-03-06

## 2024-03-06 DIAGNOSIS — G47.33 OSA (OBSTRUCTIVE SLEEP APNEA): Primary | ICD-10-CM

## 2024-03-06 NOTE — TELEPHONE ENCOUNTER
From: Saw Nelson  To: Dr. Jaime Jackson  Sent: 3/6/2024 9:13 AM EST  Subject: Referral    To whom it may concern, I have yet to be contacted regarding the referrals discussed during my last visit. Thanking you in advance.     Regards/ Saw

## 2024-03-06 NOTE — TELEPHONE ENCOUNTER
Referral to Dr Prado/arlen and Dr Strickland/TUAN sent  Pls have pt call them directly for scheduling visits

## 2024-03-11 NOTE — TELEPHONE ENCOUNTER
Will refer to NP Kitchen in [facility] for JAVIER - she's actually seeing a lot of the sleep apnea pts for [facility] pulm

## 2024-03-28 NOTE — TELEPHONE ENCOUNTER
Patient called in stating that np Kitchen office is only doing telehealth visit and they are booked out until August. He states he do no mid going out of the [email or web address] network. Please Advise

## 2024-05-12 PROBLEM — G47.33 OSA (OBSTRUCTIVE SLEEP APNEA): Status: ACTIVE | Noted: 2024-01-01

## 2024-07-01 ENCOUNTER — PATIENT MESSAGE (OUTPATIENT)
Facility: CLINIC | Age: 64
End: 2024-07-01

## 2024-07-01 NOTE — TELEPHONE ENCOUNTER
From: Saw Nelson  To: Dr. Jaime Jackson  Sent: 7/1/2024 10:19 AM EDT  Subject: Icosapent Ethyl 1 g Caps capsule    My Chart indicated I could not use the system to automatically request a Renewal for this prescription. Please send new Script to [facility] (revised to indicate I only take 2 capsules a day). Thank You.

## 2024-07-02 RX ORDER — ICOSAPENT ETHYL 1 G/1
2 CAPSULE ORAL DAILY
Qty: 180 CAPSULE | Refills: 1 | Status: SHIPPED | OUTPATIENT
Start: 2024-07-02

## 2024-08-14 NOTE — PROGRESS NOTES
64 y.o. male who presents for evaluation    In the interim, he has seen Dr Mccloud for the erythrocytosis, felt to be related to JAVIER and smoking    He saw Dr Emerson and was dx'ed and will start oral appliance when he gets it tomorrow    No cp, sob, pnd, edema, palpitations or syncope.  He's active but no set exercise outside of walks mainly due to the knee issues    Denies polyuria, polydipsia, nocturia, vision change. Sugars have been climbing when he checks.  He has not been strict with IF or the diet and regained  a lot of the weight.  He was on trulicity in the past  but stopped it after significant wt loss so open to restarting glp at this time    Vitals 7/2/2021 1/18/2021 1/8/2020 12/9/2019   Weight 206 lb 227 lb 241 lb 247 lb      3/30/2023 4/24/2023 8/1/2023 8/15/2023 2/5/2024 2/20/2024   Vitals         Weight - Scale 212 lb  209 lb  209 lb  202 lb  225 lb  224 lb      He's using 1/2 usual dose vascepa     No gi complaints.  He was taken off the prostate meds by urology    LAST MEDICARE WELLNESS EXAM: 8/20/24    Past Medical History:   Diagnosis Date    BPH with obstruction/lower urinary tract symptoms 08/2023    Dr Griffiths    Breast mass, right 2018    spontaneously resolved, saw Dr Soares    Colon polyp 12/2013    Dr Rene; and divertics    COVID-19 virus infection 2023    CTS (carpal tunnel syndrome) 08/2023    presumed    DM (diabetes mellitus) (HCC) 2016    IFG 2007; on basis of fbs>125; intol metformin, on trulicity 10/18 to 1/22;    Dyslipidemia     calculated 10 year risk score 13.6% (9/15); 11.1% (3/16)    Erythrocytosis     epo/jak2 nl (2/23); Dr Mccloud (4/24) Compass neg, likely due to smoking/javier    H/O echocardiogram 08/2018    ef 61%, mild conc lvh, no wma, tr MR, pap 29mmHg    Heel spur     LEFT    Hypertension     Metabolic dysfunction-associated steatotic liver disease (MASLD)     fatty iiver on biopsy 2000 in [place]; Fib-4 was 1.25 (3/15); 0.93 (1/22)    Morbid obesity (HCC)     peak weight 255 lbs,

## 2024-08-15 ENCOUNTER — HOSPITAL ENCOUNTER (OUTPATIENT)
Facility: HOSPITAL | Age: 64
Discharge: HOME | End: 2024-08-15
Payer: MEDICARE

## 2024-08-15 DIAGNOSIS — R80.9 DIABETES MELLITUS DUE TO UNDERLYING CONDITION, CONTROLLED, WITH MICROALBUMINURIA, WITHOUT LONG-TERM CURRENT USE OF INSULIN (HCC): ICD-10-CM

## 2024-08-15 DIAGNOSIS — E08.29 DIABETES MELLITUS DUE TO UNDERLYING CONDITION, CONTROLLED, WITH MICROALBUMINURIA, WITHOUT LONG-TERM CURRENT USE OF INSULIN (HCC): ICD-10-CM

## 2024-08-15 LAB
BASOPHILS # BLD: 0 K/UL (ref 0–0.1)
BASOPHILS NFR BLD: 0 % (ref 0–2)
CHOLEST SERPL-MCNC: 136 MG/DL
CREAT UR-MCNC: 230 MG/DL (ref 30–125)
DIFFERENTIAL METHOD BLD: ABNORMAL
EOSINOPHIL # BLD: 0.1 K/UL (ref 0–0.4)
EOSINOPHIL NFR BLD: 2 % (ref 0–5)
ERYTHROCYTE [DISTWIDTH] IN BLOOD BY AUTOMATED COUNT: 12.1 % (ref 11.6–14.5)
HBA1C MFR BLD: 6.3 % (ref 4.2–5.6)
HCT VFR BLD AUTO: 51.8 % (ref 36–48)
HDLC SERPL-MCNC: 48 MG/DL (ref 40–60)
HDLC SERPL: 2.8 (ref 0–5)
HGB BLD-MCNC: 18 G/DL (ref 13–16)
IMM GRANULOCYTES # BLD AUTO: 0 K/UL (ref 0–0.04)
IMM GRANULOCYTES NFR BLD AUTO: 1 % (ref 0–0.5)
LDLC SERPL CALC-MCNC: 64.6 MG/DL (ref 0–100)
LIPID PANEL: NORMAL
LYMPHOCYTES # BLD: 2.6 K/UL (ref 0.9–3.6)
LYMPHOCYTES NFR BLD: 29 % (ref 21–52)
MCH RBC QN AUTO: 30.4 PG (ref 24–34)
MCHC RBC AUTO-ENTMCNC: 34.7 G/DL (ref 31–37)
MCV RBC AUTO: 87.4 FL (ref 78–100)
MICROALBUMIN UR-MCNC: 4.53 MG/DL (ref 0–3)
MICROALBUMIN/CREAT UR-RTO: 20 MG/G (ref 0–30)
MONOCYTES # BLD: 0.7 K/UL (ref 0.05–1.2)
MONOCYTES NFR BLD: 8 % (ref 3–10)
NEUTS SEG # BLD: 5.3 K/UL (ref 1.8–8)
NEUTS SEG NFR BLD: 60 % (ref 40–73)
NRBC # BLD: 0 K/UL (ref 0–0.01)
NRBC BLD-RTO: 0 PER 100 WBC
PLATELET # BLD AUTO: 218 K/UL (ref 135–420)
PMV BLD AUTO: 10.8 FL (ref 9.2–11.8)
RBC # BLD AUTO: 5.93 M/UL (ref 4.35–5.65)
TRIGL SERPL-MCNC: 117 MG/DL
VLDLC SERPL CALC-MCNC: 23.4 MG/DL
WBC # BLD AUTO: 8.8 K/UL (ref 4.6–13.2)

## 2024-08-15 PROCEDURE — 80061 LIPID PANEL: CPT

## 2024-08-15 PROCEDURE — 82570 ASSAY OF URINE CREATININE: CPT

## 2024-08-15 PROCEDURE — 36415 COLL VENOUS BLD VENIPUNCTURE: CPT

## 2024-08-15 PROCEDURE — 83036 HEMOGLOBIN GLYCOSYLATED A1C: CPT

## 2024-08-15 PROCEDURE — 82043 UR ALBUMIN QUANTITATIVE: CPT

## 2024-08-15 PROCEDURE — 85025 COMPLETE CBC W/AUTO DIFF WBC: CPT

## 2024-08-20 ENCOUNTER — OFFICE VISIT (OUTPATIENT)
Facility: CLINIC | Age: 64
End: 2024-08-20

## 2024-08-20 VITALS
TEMPERATURE: 98.4 F | HEART RATE: 71 BPM | OXYGEN SATURATION: 98 % | WEIGHT: 222 LBS | HEIGHT: 69 IN | SYSTOLIC BLOOD PRESSURE: 128 MMHG | RESPIRATION RATE: 16 BRPM | DIASTOLIC BLOOD PRESSURE: 88 MMHG | BODY MASS INDEX: 32.88 KG/M2

## 2024-08-20 DIAGNOSIS — D75.1 ERYTHROCYTOSIS: ICD-10-CM

## 2024-08-20 DIAGNOSIS — E08.29 DIABETES MELLITUS DUE TO UNDERLYING CONDITION, CONTROLLED, WITH MICROALBUMINURIA, WITHOUT LONG-TERM CURRENT USE OF INSULIN (HCC): ICD-10-CM

## 2024-08-20 DIAGNOSIS — Z00.00 WELCOME TO MEDICARE PREVENTIVE VISIT: Primary | ICD-10-CM

## 2024-08-20 DIAGNOSIS — Z71.89 ADVANCED CARE PLANNING/COUNSELING DISCUSSION: ICD-10-CM

## 2024-08-20 DIAGNOSIS — E66.9 OBESITY (BMI 30.0-34.9): ICD-10-CM

## 2024-08-20 DIAGNOSIS — I10 PRIMARY HYPERTENSION: ICD-10-CM

## 2024-08-20 DIAGNOSIS — R80.9 DIABETES MELLITUS DUE TO UNDERLYING CONDITION, CONTROLLED, WITH MICROALBUMINURIA, WITHOUT LONG-TERM CURRENT USE OF INSULIN (HCC): ICD-10-CM

## 2024-08-20 DIAGNOSIS — F17.200 TOBACCO DEPENDENCE SYNDROME: ICD-10-CM

## 2024-08-20 DIAGNOSIS — K63.5 POLYP OF COLON, UNSPECIFIED PART OF COLON, UNSPECIFIED TYPE: ICD-10-CM

## 2024-08-20 DIAGNOSIS — N13.8 BPH WITH OBSTRUCTION/LOWER URINARY TRACT SYMPTOMS: ICD-10-CM

## 2024-08-20 DIAGNOSIS — I87.303 CHRONIC VENOUS HYPERTENSION INVOLVING BOTH SIDES: ICD-10-CM

## 2024-08-20 DIAGNOSIS — N40.1 BPH WITH OBSTRUCTION/LOWER URINARY TRACT SYMPTOMS: ICD-10-CM

## 2024-08-20 DIAGNOSIS — E78.5 DYSLIPIDEMIA: ICD-10-CM

## 2024-08-20 DIAGNOSIS — G47.33 OSA (OBSTRUCTIVE SLEEP APNEA): ICD-10-CM

## 2024-08-20 DIAGNOSIS — K76.0 METABOLIC DYSFUNCTION-ASSOCIATED STEATOTIC LIVER DISEASE (MASLD): ICD-10-CM

## 2024-08-20 SDOH — ECONOMIC STABILITY: FOOD INSECURITY: WITHIN THE PAST 12 MONTHS, THE FOOD YOU BOUGHT JUST DIDN'T LAST AND YOU DIDN'T HAVE MONEY TO GET MORE.: NEVER TRUE

## 2024-08-20 SDOH — ECONOMIC STABILITY: INCOME INSECURITY: HOW HARD IS IT FOR YOU TO PAY FOR THE VERY BASICS LIKE FOOD, HOUSING, MEDICAL CARE, AND HEATING?: NOT VERY HARD

## 2024-08-20 SDOH — ECONOMIC STABILITY: FOOD INSECURITY: WITHIN THE PAST 12 MONTHS, YOU WORRIED THAT YOUR FOOD WOULD RUN OUT BEFORE YOU GOT MONEY TO BUY MORE.: NEVER TRUE

## 2024-08-20 ASSESSMENT — PATIENT HEALTH QUESTIONNAIRE - PHQ9
2. FEELING DOWN, DEPRESSED OR HOPELESS: NOT AT ALL
1. LITTLE INTEREST OR PLEASURE IN DOING THINGS: NOT AT ALL
SUM OF ALL RESPONSES TO PHQ QUESTIONS 1-9: 0
SUM OF ALL RESPONSES TO PHQ9 QUESTIONS 1 & 2: 0
SUM OF ALL RESPONSES TO PHQ QUESTIONS 1-9: 0

## 2024-08-20 NOTE — PROGRESS NOTES
Saw Nelson presents today for   Chief Complaint   Patient presents with    6 Month Follow-Up    Hypertension       \"Have you been to the ER, urgent care clinic since your last visit?  Hospitalized since your last visit?\"    NO    “Have you seen or consulted any other health care providers outside of [facility] since your last visit?”    YES - When: approximately within months ago.  Where and Why: [facility], Neurology.

## 2024-08-21 PROBLEM — D75.1 ERYTHROCYTOSIS: Status: ACTIVE | Noted: 2024-08-21

## 2024-08-21 NOTE — ACP (ADVANCE CARE PLANNING)
Advance Care Planning     Advance Care Planning (ACP) Physician/NP/PA Conversation    Date of Conversation: 8/20/2024  Conducted with: Patient with Decision Making Capacity    Healthcare Decision Maker:      Primary Decision Maker: Fransisca Nelson - Spouse - [phone]    Click here to complete Healthcare Decision Makers including selection of the Healthcare Decision Maker Relationship (ie \"Primary\")  Today we documented Decision Maker(s) consistent with Legal Next of Kin hierarchy.    Care Preferences:    Hospitalization:  \"If your health worsens and it becomes clear that your chance of recovery is unlikely, what would be your preference regarding hospitalization?\"  The patient would prefer hospitalization.    Ventilation:  \"If you were unable to breath on your own and your chance of recovery was unlikely, what would be your preference about the use of a ventilator (breathing machine) if it was available to you?\"  The patient would desire the use of a ventilator.    Resuscitation:  \"In the event your heart stopped as a result of an underlying serious health condition, would you want attempts made to restart your heart, or would you prefer a natural death?\"  Yes, attempt to resuscitate.    ventilation preferences, hospitalization preferences, and resuscitation preferences    Conversation Outcomes / Follow-Up Plan:  ACP in process - information provided, considering goals and options  Reviewed DNR/DNI and patient elects Full Code (Attempt Resuscitation)    Length of Voluntary ACP Conversation in minutes:  16 minutes    SAÚL SHAH MD

## 2024-08-21 NOTE — PROGRESS NOTES
Medicare Annual Wellness Visit    Saw Nelson is here for 6 Month Follow-Up, Hypertension, and Medicare AWV    Assessment & Plan   Metabolic dysfunction-associated steatotic liver disease (MASLD)  Advanced care planning/counseling discussion  Chronic venous hypertension involving both sides  Primary hypertension  JAVIER (obstructive sleep apnea)  Polyp of colon, unspecified part of colon, unspecified type  Diabetes mellitus due to underlying condition, controlled, with microalbuminuria, without long-term current use of insulin (HCC)  -     HEMOGLOBIN A1C W/O EAG; Future  -     Comprehensive Metabolic Panel; Future  BPH with obstruction/lower urinary tract symptoms  Dyslipidemia  Obesity (BMI 30.0-34.9)  Tobacco dependence syndrome  Erythrocytosis  -     CBC with Auto Differential; Future  Welcome to Medicare preventive visit    Recommendations for Preventive Services Due: see orders and patient instructions/AVS.  Recommended screening schedule for the next 5-10 years is provided to the patient in written form: see Patient Instructions/AVS.     Return in 4 months (on 12/20/2024) for Medicare Annual Wellness Visit in 1 year.     Subjective       Patient's complete Health Risk Assessment and screening values have been reviewed and are found in Flowsheets. The following problems were reviewed today and where indicated follow up appointments were made and/or referrals ordered.    Positive Risk Factor Screenings with Interventions:          Drug Use:   Substance and Sexual Activity   Drug Use Yes    Types: Marijuana (Weed)     Interventions:  Patient declined any further intervention or treatment           Abnormal BMI (obese):  Body mass index is 32.78 kg/m². (!) Abnormal  Interventions:  Lifestyle and dietary measures.  Portion control reiterated.  Restart glp    Obesity Counseling: Patient was asked about his current diet and exercise habits, and personalized advice was provided regarding recommended lifestyle

## 2024-08-21 NOTE — PATIENT INSTRUCTIONS

## 2024-08-25 ENCOUNTER — PATIENT MESSAGE (OUTPATIENT)
Facility: CLINIC | Age: 64
End: 2024-08-25

## 2024-08-27 ENCOUNTER — TELEPHONE (OUTPATIENT)
Facility: CLINIC | Age: 64
End: 2024-08-27

## 2024-08-27 DIAGNOSIS — R80.9 DIABETES MELLITUS DUE TO UNDERLYING CONDITION, CONTROLLED, WITH MICROALBUMINURIA, WITHOUT LONG-TERM CURRENT USE OF INSULIN (HCC): Primary | ICD-10-CM

## 2024-08-27 DIAGNOSIS — E08.29 DIABETES MELLITUS DUE TO UNDERLYING CONDITION, CONTROLLED, WITH MICROALBUMINURIA, WITHOUT LONG-TERM CURRENT USE OF INSULIN (HCC): Primary | ICD-10-CM

## 2024-08-27 NOTE — TELEPHONE ENCOUNTER
Pt came into office states was supposed to start ozempic per last OV but there is no medication req sent to the pharmacy.     Please advise.       Pharmacy     [facility] [id number] - [place], [place] - [facility] - P [phone] - F [phone] [[id number]]     Call back req

## 2024-09-11 DIAGNOSIS — E08.29 DIABETES MELLITUS DUE TO UNDERLYING CONDITION, CONTROLLED, WITH MICROALBUMINURIA, WITHOUT LONG-TERM CURRENT USE OF INSULIN (HCC): ICD-10-CM

## 2024-09-11 DIAGNOSIS — R80.9 DIABETES MELLITUS DUE TO UNDERLYING CONDITION, CONTROLLED, WITH MICROALBUMINURIA, WITHOUT LONG-TERM CURRENT USE OF INSULIN (HCC): ICD-10-CM

## 2024-09-13 DIAGNOSIS — R80.9 DIABETES MELLITUS DUE TO UNDERLYING CONDITION, CONTROLLED, WITH MICROALBUMINURIA, WITHOUT LONG-TERM CURRENT USE OF INSULIN (HCC): ICD-10-CM

## 2024-09-13 DIAGNOSIS — E08.29 DIABETES MELLITUS DUE TO UNDERLYING CONDITION, CONTROLLED, WITH MICROALBUMINURIA, WITHOUT LONG-TERM CURRENT USE OF INSULIN (HCC): ICD-10-CM

## 2024-09-13 NOTE — TELEPHONE ENCOUNTER
Pt requesting refill    Semaglutide 0.25 or 0.5MG/DOS, 2 MG/3ML SOPN    Pt stated that the last order is broken/defective and he was approved to get another order but the doctor would need to send a new order    [facility]  [address] [place] [address]    [phone]

## 2024-09-16 RX ORDER — SEMAGLUTIDE 0.68 MG/ML
INJECTION, SOLUTION SUBCUTANEOUS
Qty: 3 ML | Refills: 0 | OUTPATIENT
Start: 2024-09-16

## 2024-10-11 ENCOUNTER — TELEPHONE (OUTPATIENT)
Facility: CLINIC | Age: 64
End: 2024-10-11

## 2024-11-04 ENCOUNTER — PATIENT MESSAGE (OUTPATIENT)
Facility: CLINIC | Age: 64
End: 2024-11-04

## 2024-11-04 DIAGNOSIS — E08.29 DIABETES MELLITUS DUE TO UNDERLYING CONDITION, CONTROLLED, WITH MICROALBUMINURIA, WITHOUT LONG-TERM CURRENT USE OF INSULIN (HCC): Primary | ICD-10-CM

## 2024-11-04 DIAGNOSIS — R80.9 DIABETES MELLITUS DUE TO UNDERLYING CONDITION, CONTROLLED, WITH MICROALBUMINURIA, WITHOUT LONG-TERM CURRENT USE OF INSULIN (HCC): Primary | ICD-10-CM

## 2024-11-05 RX ORDER — SEMAGLUTIDE 1.34 MG/ML
1 INJECTION, SOLUTION SUBCUTANEOUS
Qty: 3 ML | Refills: 0 | Status: SHIPPED | OUTPATIENT
Start: 2024-11-05

## 2024-11-08 RX ORDER — AMLODIPINE BESYLATE 5 MG/1
5 TABLET ORAL DAILY
Qty: 90 TABLET | Refills: 3 | Status: SHIPPED | OUTPATIENT
Start: 2024-11-08

## 2024-11-08 RX ORDER — ATORVASTATIN CALCIUM 10 MG/1
10 TABLET, FILM COATED ORAL DAILY
Qty: 90 TABLET | Refills: 3 | Status: SHIPPED | OUTPATIENT
Start: 2024-11-08

## 2024-12-02 DIAGNOSIS — R80.9 DIABETES MELLITUS DUE TO UNDERLYING CONDITION, CONTROLLED, WITH MICROALBUMINURIA, WITHOUT LONG-TERM CURRENT USE OF INSULIN (HCC): ICD-10-CM

## 2024-12-02 DIAGNOSIS — E08.29 DIABETES MELLITUS DUE TO UNDERLYING CONDITION, CONTROLLED, WITH MICROALBUMINURIA, WITHOUT LONG-TERM CURRENT USE OF INSULIN (HCC): ICD-10-CM

## 2024-12-02 NOTE — TELEPHONE ENCOUNTER
PCP: Jaime Jackson MD    LAST OFFICE VISIT: 08/20/2024    LAST REFILL PER CHART:  Medication:Semaglutide, 1 MG/DOSE, (OZEMPIC, 1 MG/DOSE,) 4 MG/3ML SOPN sc injection   Ordered On:11/05/2024  Instructions:Inject 1 mg into the skin every 7 days   Dispense:3 mL  Refills:0      Future Appointments   Date Time Provider Department Center   12/20/2024  8:00 AM IOC LAB VISIT [facility] DEP   1/2/2025  9:00 AM Jaime Jackson MD [facility] DEP

## 2024-12-06 NOTE — TELEPHONE ENCOUNTER
Notified pt of increase on Ozempic to 2mg and that his rx was sent to [facility] on [address].

## 2024-12-20 ENCOUNTER — HOSPITAL ENCOUNTER (OUTPATIENT)
Facility: HOSPITAL | Age: 64
Setting detail: SPECIMEN
Discharge: HOME | End: 2024-12-23
Payer: MEDICARE

## 2024-12-20 LAB
ALBUMIN SERPL-MCNC: 4.3 G/DL (ref 3.4–5)
ALBUMIN/GLOB SERPL: 1.3 (ref 0.8–1.7)
ALP SERPL-CCNC: 55 U/L (ref 45–117)
ALT SERPL-CCNC: 70 U/L (ref 16–61)
ANION GAP SERPL CALC-SCNC: 5 MMOL/L (ref 3–18)
AST SERPL-CCNC: 40 U/L (ref 10–38)
BASOPHILS # BLD: 0 K/UL (ref 0–0.1)
BASOPHILS NFR BLD: 0 % (ref 0–2)
BILIRUB SERPL-MCNC: 0.9 MG/DL (ref 0.2–1)
BUN SERPL-MCNC: 15 MG/DL (ref 7–18)
BUN/CREAT SERPL: 18 (ref 12–20)
CALCIUM SERPL-MCNC: 9.5 MG/DL (ref 8.5–10.1)
CHLORIDE SERPL-SCNC: 105 MMOL/L (ref 100–111)
CO2 SERPL-SCNC: 28 MMOL/L (ref 21–32)
CREAT SERPL-MCNC: 0.84 MG/DL (ref 0.6–1.3)
DIFFERENTIAL METHOD BLD: ABNORMAL
EOSINOPHIL # BLD: 0.2 K/UL (ref 0–0.4)
EOSINOPHIL NFR BLD: 2 % (ref 0–5)
ERYTHROCYTE [DISTWIDTH] IN BLOOD BY AUTOMATED COUNT: 12.1 % (ref 11.6–14.5)
EST. AVERAGE GLUCOSE BLD GHB EST-MCNC: 108 MG/DL
GLOBULIN SER CALC-MCNC: 3.3 G/DL (ref 2–4)
GLUCOSE SERPL-MCNC: 118 MG/DL (ref 74–99)
HBA1C MFR BLD: 5.4 % (ref 4.2–5.6)
HCT VFR BLD AUTO: 53.2 % (ref 36–48)
HGB BLD-MCNC: 17.5 G/DL (ref 13–16)
IMM GRANULOCYTES # BLD AUTO: 0 K/UL (ref 0–0.04)
IMM GRANULOCYTES NFR BLD AUTO: 0 % (ref 0–0.5)
LYMPHOCYTES # BLD: 2.6 K/UL (ref 0.9–3.6)
LYMPHOCYTES NFR BLD: 28 % (ref 21–52)
MCH RBC QN AUTO: 28.9 PG (ref 24–34)
MCHC RBC AUTO-ENTMCNC: 32.9 G/DL (ref 31–37)
MCV RBC AUTO: 87.8 FL (ref 78–100)
MONOCYTES # BLD: 0.7 K/UL (ref 0.05–1.2)
MONOCYTES NFR BLD: 8 % (ref 3–10)
NEUTS SEG # BLD: 5.8 K/UL (ref 1.8–8)
NEUTS SEG NFR BLD: 62 % (ref 40–73)
NRBC # BLD: 0 K/UL (ref 0–0.01)
NRBC BLD-RTO: 0 PER 100 WBC
PLATELET # BLD AUTO: 281 K/UL (ref 135–420)
PMV BLD AUTO: 9.9 FL (ref 9.2–11.8)
POTASSIUM SERPL-SCNC: 4.6 MMOL/L (ref 3.5–5.5)
PROT SERPL-MCNC: 7.6 G/DL (ref 6.4–8.2)
RBC # BLD AUTO: 6.06 M/UL (ref 4.35–5.65)
SODIUM SERPL-SCNC: 138 MMOL/L (ref 136–145)
WBC # BLD AUTO: 9.4 K/UL (ref 4.6–13.2)

## 2024-12-20 PROCEDURE — 80053 COMPREHEN METABOLIC PANEL: CPT

## 2024-12-20 PROCEDURE — 85025 COMPLETE CBC W/AUTO DIFF WBC: CPT

## 2024-12-20 PROCEDURE — 83036 HEMOGLOBIN GLYCOSYLATED A1C: CPT

## 2024-12-20 PROCEDURE — 36415 COLL VENOUS BLD VENIPUNCTURE: CPT

## 2025-01-02 ENCOUNTER — OFFICE VISIT (OUTPATIENT)
Facility: CLINIC | Age: 65
End: 2025-01-02

## 2025-01-02 VITALS
BODY MASS INDEX: 32.14 KG/M2 | WEIGHT: 217 LBS | HEART RATE: 69 BPM | HEIGHT: 69 IN | OXYGEN SATURATION: 98 % | SYSTOLIC BLOOD PRESSURE: 130 MMHG | DIASTOLIC BLOOD PRESSURE: 85 MMHG | RESPIRATION RATE: 16 BRPM | TEMPERATURE: 98.8 F

## 2025-01-02 DIAGNOSIS — K76.0 METABOLIC DYSFUNCTION-ASSOCIATED STEATOTIC LIVER DISEASE (MASLD): ICD-10-CM

## 2025-01-02 DIAGNOSIS — E66.811 OBESITY (BMI 30.0-34.9): ICD-10-CM

## 2025-01-02 DIAGNOSIS — D75.1 ERYTHROCYTOSIS: ICD-10-CM

## 2025-01-02 DIAGNOSIS — I10 PRIMARY HYPERTENSION: Primary | ICD-10-CM

## 2025-01-02 DIAGNOSIS — N13.8 BPH WITH OBSTRUCTION/LOWER URINARY TRACT SYMPTOMS: ICD-10-CM

## 2025-01-02 DIAGNOSIS — G47.33 OSA (OBSTRUCTIVE SLEEP APNEA): ICD-10-CM

## 2025-01-02 DIAGNOSIS — R80.9 DIABETES MELLITUS DUE TO UNDERLYING CONDITION, CONTROLLED, WITH MICROALBUMINURIA, WITHOUT LONG-TERM CURRENT USE OF INSULIN (HCC): ICD-10-CM

## 2025-01-02 DIAGNOSIS — E08.29 DIABETES MELLITUS DUE TO UNDERLYING CONDITION, CONTROLLED, WITH MICROALBUMINURIA, WITHOUT LONG-TERM CURRENT USE OF INSULIN (HCC): ICD-10-CM

## 2025-01-02 DIAGNOSIS — N40.1 BPH WITH OBSTRUCTION/LOWER URINARY TRACT SYMPTOMS: ICD-10-CM

## 2025-01-02 DIAGNOSIS — E78.5 DYSLIPIDEMIA: ICD-10-CM

## 2025-01-02 DIAGNOSIS — Z12.5 SCREENING FOR MALIGNANT NEOPLASM OF PROSTATE: ICD-10-CM

## 2025-01-02 ASSESSMENT — PATIENT HEALTH QUESTIONNAIRE - PHQ9
2. FEELING DOWN, DEPRESSED OR HOPELESS: NOT AT ALL
SUM OF ALL RESPONSES TO PHQ9 QUESTIONS 1 & 2: 0
SUM OF ALL RESPONSES TO PHQ QUESTIONS 1-9: 0
1. LITTLE INTEREST OR PLEASURE IN DOING THINGS: NOT AT ALL

## 2025-01-02 NOTE — PROGRESS NOTES
Saw Nelson presents today for   Chief Complaint   Patient presents with    4 Month Follow-Up    Hypertension    Diabetes       \"Have you been to the ER, urgent care clinic since your last visit?  Hospitalized since your last visit?\"    NO    “Have you seen or consulted any other health care providers outside of [facility] since your last visit?”    YES - When: approximately 1 months ago.  Where and Why: [facility].           
peak weight 255 lbs, bmi 37.6 from 3/12; dec henry supp, med sup wt loss, bariatrics; IF (5/18) start wt 258 lbs; Trulicity (10/18) start wt 248 lbs, stopped (1/22) end wt 196 lbs    JAVIER (obstructive sleep apnea) 2024    Dr Emerson/[facility] 11.1, min desats 89% for oral henry    Osteoarthritis 2018    Dr Villar; right shoulder on bone scan    Pancreatitis 10/2014    and cholecystitis Dr Egan    Rosagaetano     Tobacco dependence syndrome     Venous hypertension 08/03/2015    Dr Escobedo     Past Surgical History:   Procedure Laterality Date    CHOLECYSTECTOMY  12/2014    Dr Egan    COLONOSCOPY      (2003) neg; Dr Rene (2013) diverticulosis and polyps; (8/30/23) 2 polyps - 3 yr    HEENT  09/2017     thyroid negative    KNEE ARTHROSCOPY W/ MENISCECTOMY Left 2015    Dr Nevarez    TESTICLE TORSION REDUCTION Right 1960    TOTAL KNEE ARTHROPLASTY Left 01/2020    Dr Coffey    VASCULAR SURGERY  09/2015    LEFT GSV and EVLT Dr Escobedo    VASECTOMY       Social History     Socioeconomic History    Marital status:      Spouse name: Not on file    Number of children: 1    Years of education: Not on file    Highest education level: Not on file   Occupational History    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 [employer]   Tobacco Use    Smoking status: Every Day     Types: Cigars     Passive exposure: Current    Smokeless tobacco: Never   Substance and Sexual Activity    Alcohol use: Yes     Comment: More in line with 1 or 2 drinks per month    Drug use: Yes     Types: Marijuana (Weed)    Sexual activity: Not on file   Other Topics Concern    Not on file   Social History Narrative    Not on file     Social Determinants of Health     Financial Resource Strain: Low Risk  (8/20/2024)    Overall Financial Resource Strain (CARDIA)     Difficulty of Paying Living Expenses: Not very hard   Food Insecurity: No Food Insecurity (8/20/2024)    Hunger Vital Sign     Worried About Running Out of Food in the Last Year: Never true

## 2025-02-10 ENCOUNTER — PATIENT MESSAGE (OUTPATIENT)
Facility: CLINIC | Age: 65
End: 2025-02-10

## 2025-07-02 ENCOUNTER — HOSPITAL ENCOUNTER (OUTPATIENT)
Facility: HOSPITAL | Age: 65
Setting detail: SPECIMEN
Discharge: HOME | End: 2025-07-05
Payer: MEDICARE

## 2025-07-02 DIAGNOSIS — Z12.5 SCREENING FOR MALIGNANT NEOPLASM OF PROSTATE: ICD-10-CM

## 2025-07-02 DIAGNOSIS — R80.9 DIABETES MELLITUS DUE TO UNDERLYING CONDITION, CONTROLLED, WITH MICROALBUMINURIA, WITHOUT LONG-TERM CURRENT USE OF INSULIN (HCC): ICD-10-CM

## 2025-07-02 DIAGNOSIS — E08.29 DIABETES MELLITUS DUE TO UNDERLYING CONDITION, CONTROLLED, WITH MICROALBUMINURIA, WITHOUT LONG-TERM CURRENT USE OF INSULIN (HCC): ICD-10-CM

## 2025-07-02 DIAGNOSIS — D75.1 ERYTHROCYTOSIS: ICD-10-CM

## 2025-07-02 LAB
ALBUMIN SERPL-MCNC: 4.2 G/DL (ref 3.4–5)
ALBUMIN/GLOB SERPL: 1.5 (ref 0.8–1.7)
ALP SERPL-CCNC: 50 U/L (ref 45–117)
ALT SERPL-CCNC: 33 U/L (ref 10–50)
ANION GAP SERPL CALC-SCNC: 12 MMOL/L (ref 3–18)
AST SERPL-CCNC: 24 U/L (ref 10–38)
BASOPHILS # BLD: 0.04 K/UL (ref 0–0.1)
BASOPHILS NFR BLD: 0.4 % (ref 0–2)
BILIRUB SERPL-MCNC: 0.8 MG/DL (ref 0.2–1)
BUN SERPL-MCNC: 20 MG/DL (ref 6–23)
BUN/CREAT SERPL: 24 (ref 12–20)
CALCIUM SERPL-MCNC: 9.8 MG/DL (ref 8.5–10.1)
CHLORIDE SERPL-SCNC: 104 MMOL/L (ref 98–107)
CO2 SERPL-SCNC: 24 MMOL/L (ref 21–32)
CREAT SERPL-MCNC: 0.84 MG/DL (ref 0.6–1.3)
CREAT UR-MCNC: 251 MG/DL (ref 30–125)
DIFFERENTIAL METHOD BLD: ABNORMAL
EOSINOPHIL # BLD: 0.18 K/UL (ref 0–0.4)
EOSINOPHIL NFR BLD: 1.7 % (ref 0–5)
ERYTHROCYTE [DISTWIDTH] IN BLOOD BY AUTOMATED COUNT: 12.9 % (ref 11.6–14.5)
GLOBULIN SER CALC-MCNC: 2.8 G/DL (ref 2–4)
GLUCOSE SERPL-MCNC: 111 MG/DL (ref 74–108)
HBA1C MFR BLD: 5.4 % (ref 4.2–5.6)
HCT VFR BLD AUTO: 52.3 % (ref 36–48)
HGB BLD-MCNC: 17.2 G/DL (ref 13–16)
IMM GRANULOCYTES # BLD AUTO: 0.05 K/UL (ref 0–0.04)
IMM GRANULOCYTES NFR BLD AUTO: 0.5 % (ref 0–0.5)
LYMPHOCYTES # BLD: 3.01 K/UL (ref 0.9–3.6)
LYMPHOCYTES NFR BLD: 28.7 % (ref 21–52)
MCH RBC QN AUTO: 29.4 PG (ref 24–34)
MCHC RBC AUTO-ENTMCNC: 32.9 G/DL (ref 31–37)
MCV RBC AUTO: 89.4 FL (ref 78–100)
MICROALBUMIN UR-MCNC: 1.88 MG/DL (ref 0–3)
MICROALBUMIN/CREAT UR-RTO: 7 MG/G (ref 0–30)
MONOCYTES # BLD: 0.8 K/UL (ref 0.05–1.2)
MONOCYTES NFR BLD: 7.6 % (ref 3–10)
NEUTS SEG # BLD: 6.42 K/UL (ref 1.8–8)
NEUTS SEG NFR BLD: 61.1 % (ref 40–73)
NRBC # BLD: 0 K/UL (ref 0–0.01)
NRBC BLD-RTO: 0 PER 100 WBC
PLATELET # BLD AUTO: 241 K/UL (ref 135–420)
PMV BLD AUTO: 10.6 FL (ref 9.2–11.8)
POTASSIUM SERPL-SCNC: 4.5 MMOL/L (ref 3.5–5.5)
PROT SERPL-MCNC: 7.1 G/DL (ref 6.4–8.2)
PSA SERPL-MCNC: 0.4 NG/ML (ref 1.4–4.4)
RBC # BLD AUTO: 5.85 M/UL (ref 4.35–5.65)
SODIUM SERPL-SCNC: 140 MMOL/L (ref 136–145)
WBC # BLD AUTO: 10.5 K/UL (ref 4.6–13.2)

## 2025-07-02 PROCEDURE — 36415 COLL VENOUS BLD VENIPUNCTURE: CPT

## 2025-07-02 PROCEDURE — 82043 UR ALBUMIN QUANTITATIVE: CPT

## 2025-07-02 PROCEDURE — G0103 PSA SCREENING: HCPCS

## 2025-07-02 PROCEDURE — 85025 COMPLETE CBC W/AUTO DIFF WBC: CPT

## 2025-07-02 PROCEDURE — 82570 ASSAY OF URINE CREATININE: CPT

## 2025-07-02 PROCEDURE — 80053 COMPREHEN METABOLIC PANEL: CPT

## 2025-07-02 PROCEDURE — 83036 HEMOGLOBIN GLYCOSYLATED A1C: CPT

## 2025-07-08 ENCOUNTER — OFFICE VISIT (OUTPATIENT)
Facility: CLINIC | Age: 65
End: 2025-07-08

## 2025-07-08 VITALS
BODY MASS INDEX: 30.96 KG/M2 | OXYGEN SATURATION: 99 % | WEIGHT: 209 LBS | DIASTOLIC BLOOD PRESSURE: 89 MMHG | RESPIRATION RATE: 16 BRPM | HEIGHT: 69 IN | SYSTOLIC BLOOD PRESSURE: 130 MMHG | HEART RATE: 64 BPM | TEMPERATURE: 98.4 F

## 2025-07-08 DIAGNOSIS — N40.1 BPH WITH OBSTRUCTION/LOWER URINARY TRACT SYMPTOMS: ICD-10-CM

## 2025-07-08 DIAGNOSIS — E66.811 OBESITY (BMI 30.0-34.9): ICD-10-CM

## 2025-07-08 DIAGNOSIS — I10 PRIMARY HYPERTENSION: ICD-10-CM

## 2025-07-08 DIAGNOSIS — D75.1 ERYTHROCYTOSIS: ICD-10-CM

## 2025-07-08 DIAGNOSIS — E78.5 DYSLIPIDEMIA: ICD-10-CM

## 2025-07-08 DIAGNOSIS — N13.8 BPH WITH OBSTRUCTION/LOWER URINARY TRACT SYMPTOMS: ICD-10-CM

## 2025-07-08 DIAGNOSIS — R80.9 DIABETES MELLITUS DUE TO UNDERLYING CONDITION, CONTROLLED, WITH MICROALBUMINURIA, WITHOUT LONG-TERM CURRENT USE OF INSULIN (HCC): Primary | ICD-10-CM

## 2025-07-08 DIAGNOSIS — E08.29 DIABETES MELLITUS DUE TO UNDERLYING CONDITION, CONTROLLED, WITH MICROALBUMINURIA, WITHOUT LONG-TERM CURRENT USE OF INSULIN (HCC): Primary | ICD-10-CM

## 2025-07-08 DIAGNOSIS — G47.33 OSA (OBSTRUCTIVE SLEEP APNEA): ICD-10-CM

## 2025-07-08 DIAGNOSIS — K76.0 METABOLIC DYSFUNCTION-ASSOCIATED STEATOTIC LIVER DISEASE (MASLD): ICD-10-CM

## 2025-07-08 DIAGNOSIS — I87.303 CHRONIC VENOUS HYPERTENSION INVOLVING BOTH SIDES: ICD-10-CM

## 2025-07-08 RX ORDER — DOXEPIN HYDROCHLORIDE 10 MG/1
10 CAPSULE ORAL NIGHTLY
COMMUNITY
Start: 2025-06-16

## 2025-07-08 NOTE — PROGRESS NOTES
Saw Nelson presents today for   Chief Complaint   Patient presents with    6 Month Follow-Up    Hypertension    Diabetes       \"Have you been to the ER, urgent care clinic since your last visit?  Hospitalized since your last visit?\"    NO    “Have you seen or consulted any other health care providers outside of [facility] since your last visit?”    YES - When: approximately 1 months ago.  Where and Why: [facility]/Dr. Emerson: sleep related bruxism.           
(obstructive sleep apnea)    Metabolic dysfunction-associated steatotic liver disease (MASLD)    Erythrocytosis         Assessment and plan:  1.  Hypertension.  Controlled on current   2.  Obesity.   Lifestyle and dietary measures, portion control, IF.  He will go camping and then ask to change to mounjaro after he comes back.  Risks and benefits reviewed  3.  MASLD w low fib-4.  Continue trending, wt loss as he is doing  4.  DM.  Continue current approach  5.  Dyslipidemia. Continue current regimen.  6.  BPH/LUTS.  Continue current and follow up Dr Griffiths as scheduled   7.  Vascular.  F/U vascular as needed  8.  Ortho.  F/U Dr Coffey.   9.  Polycythemia. Follow up Dr Mccloud as directed  10.  Smoking cessation reiterated.  Discussed inablity to sched LDCT with his last cig in 1994        RTC 1/26    Above conditions discussed at length and patient vocalized understanding.  All questions answered to patient satisfaction     Diagnosis Orders   1. Diabetes mellitus due to underlying condition, controlled, with microalbuminuria, without long-term current use of insulin (HCC)  Basic Metabolic Panel    HEMOGLOBIN A1C W/O EAG      2. Chronic venous hypertension involving both sides        3. Primary hypertension  CBC with Auto Differential      4. JAVIER (obstructive sleep apnea)        5. Metabolic dysfunction-associated steatotic liver disease (MASLD)        6. BPH with obstruction/lower urinary tract symptoms        7. Dyslipidemia  Lipid Panel      8. Erythrocytosis        9. Obesity (BMI 30.0-34.9)

## 2025-07-31 RX ORDER — ICOSAPENT ETHYL 1 G/1
2 CAPSULE ORAL DAILY
Qty: 180 CAPSULE | Refills: 1 | Status: SHIPPED | OUTPATIENT
Start: 2025-07-31

## 2025-08-30 DIAGNOSIS — E08.29 DIABETES MELLITUS DUE TO UNDERLYING CONDITION, CONTROLLED, WITH MICROALBUMINURIA, WITHOUT LONG-TERM CURRENT USE OF INSULIN (HCC): ICD-10-CM

## 2025-08-30 DIAGNOSIS — R80.9 DIABETES MELLITUS DUE TO UNDERLYING CONDITION, CONTROLLED, WITH MICROALBUMINURIA, WITHOUT LONG-TERM CURRENT USE OF INSULIN (HCC): ICD-10-CM

## 2025-09-04 RX ORDER — TIRZEPATIDE 10 MG/.5ML
INJECTION, SOLUTION SUBCUTANEOUS
Qty: 2 ML | Refills: 0 | OUTPATIENT
Start: 2025-09-04